# Patient Record
Sex: MALE | Race: WHITE | Employment: OTHER | URBAN - NONMETROPOLITAN AREA
[De-identification: names, ages, dates, MRNs, and addresses within clinical notes are randomized per-mention and may not be internally consistent; named-entity substitution may affect disease eponyms.]

---

## 2018-09-13 ENCOUNTER — OFFICE VISIT (OUTPATIENT)
Dept: FAMILY MEDICINE CLINIC | Age: 68
End: 2018-09-13
Payer: MEDICARE

## 2018-09-13 VITALS
BODY MASS INDEX: 26.07 KG/M2 | HEART RATE: 66 BPM | WEIGHT: 176 LBS | DIASTOLIC BLOOD PRESSURE: 80 MMHG | HEIGHT: 69 IN | SYSTOLIC BLOOD PRESSURE: 138 MMHG

## 2018-09-13 DIAGNOSIS — L40.9 PSORIASIS: ICD-10-CM

## 2018-09-13 DIAGNOSIS — R35.1 BENIGN PROSTATIC HYPERPLASIA WITH NOCTURIA: Primary | ICD-10-CM

## 2018-09-13 DIAGNOSIS — N40.1 BENIGN PROSTATIC HYPERPLASIA WITH NOCTURIA: Primary | ICD-10-CM

## 2018-09-13 DIAGNOSIS — E78.2 MIXED HYPERLIPIDEMIA: ICD-10-CM

## 2018-09-13 DIAGNOSIS — Z12.5 SCREENING PSA (PROSTATE SPECIFIC ANTIGEN): ICD-10-CM

## 2018-09-13 PROCEDURE — 99203 OFFICE O/P NEW LOW 30 MIN: CPT | Performed by: FAMILY MEDICINE

## 2018-09-13 RX ORDER — TAMSULOSIN HYDROCHLORIDE 0.4 MG/1
0.4 CAPSULE ORAL DAILY
COMMUNITY
End: 2018-09-21 | Stop reason: SDUPTHER

## 2018-09-13 RX ORDER — ASPIRIN 81 MG/1
81 TABLET ORAL DAILY
COMMUNITY

## 2018-09-13 RX ORDER — KETOCONAZOLE 20 MG/ML
SHAMPOO TOPICAL EVERY OTHER DAY
COMMUNITY
End: 2020-03-05 | Stop reason: SDUPTHER

## 2018-09-13 RX ORDER — ASPIRIN 81 MG
1 TABLET, DELAYED RELEASE (ENTERIC COATED) ORAL DAILY
COMMUNITY

## 2018-09-13 RX ORDER — TRIAMCINOLONE ACETONIDE 0.25 MG/G
CREAM TOPICAL EVERY 4 HOURS PRN
COMMUNITY
End: 2021-10-08 | Stop reason: ALTCHOICE

## 2018-09-13 ASSESSMENT — PATIENT HEALTH QUESTIONNAIRE - PHQ9
SUM OF ALL RESPONSES TO PHQ QUESTIONS 1-9: 0
2. FEELING DOWN, DEPRESSED OR HOPELESS: 0
SUM OF ALL RESPONSES TO PHQ9 QUESTIONS 1 & 2: 0
1. LITTLE INTEREST OR PLEASURE IN DOING THINGS: 0
SUM OF ALL RESPONSES TO PHQ QUESTIONS 1-9: 0

## 2018-09-13 ASSESSMENT — ENCOUNTER SYMPTOMS
SHORTNESS OF BREATH: 0
WHEEZING: 0

## 2018-09-13 NOTE — PROGRESS NOTES
[facility] PROFESSIONAL SERVS  [facility]  [address] [address]  Dept: [phone]  Dept Fax: [phone]  Loc: [phone]  PROGRESS NOTE      Visit Date: 9/13/2018    Jeannine Marcial is a 70 y.o. male who presents today for:  Chief Complaint   Patient presents with    New Patient     Establish. Wants routine labs       Subjective:  HPI     New patient. Moved from [facility] as wife has dementia. BPH:  On flomax. Doing well. Urinates about 3x per night. No dysuria. He does not recall previous PSA test.    Hyperlipidemia:  Not on any medication. Had side effects from crestor. No hx of MI. Hep B:  He thinks he got it from travel. Psoriasis:  Takes medication for scalp. Has itching on arms. Last blood work about 1 year ago. Has dry cough    Had colonoscopy about 5 years ago. Review of Systems   Constitutional: Negative for chills and fever. Respiratory: Negative for shortness of breath and wheezing. Cardiovascular: Negative for chest pain and leg swelling. Genitourinary: Positive for frequency. Negative for dysuria and urgency. Skin: Negative for rash and wound.      Past Medical History:   Diagnosis Date    BPH (benign prostatic hyperplasia)     Hepatitis B     Hyperlipidemia     Psoriasis       Past Surgical History:   Procedure Laterality Date    BACK SURGERY  2015    Herniated disk     MOUTH SURGERY  1968     Family History   Problem Relation Age of Onset    High Cholesterol Mother     Emphysema Father     Asthma Father     Cancer Sister     Cancer Brother      Social History   Substance Use Topics    Smoking status: Never Smoker    Smokeless tobacco: Never Used    Alcohol use No      Current Outpatient Prescriptions   Medication Sig Dispense Refill    Multiple Vitamins-Minerals (MULTIVITAMIN-MINERALS) TABS tablet Take 1 tablet by mouth daily      aspirin EC 81 MG EC tablet Take 81 mg by mouth daily     

## 2018-09-14 ENCOUNTER — NURSE ONLY (OUTPATIENT)
Dept: FAMILY MEDICINE CLINIC | Age: 68
End: 2018-09-14
Payer: MEDICARE

## 2018-09-14 DIAGNOSIS — E78.2 MIXED HYPERLIPIDEMIA: ICD-10-CM

## 2018-09-14 DIAGNOSIS — Z12.5 SCREENING PSA (PROSTATE SPECIFIC ANTIGEN): ICD-10-CM

## 2018-09-14 LAB
ALBUMIN SERPL-MCNC: 4.8 G/DL (ref 3.5–5.1)
ALP BLD-CCNC: 118 U/L (ref 38–126)
ALT SERPL-CCNC: 27 U/L (ref 11–66)
ANION GAP SERPL CALCULATED.3IONS-SCNC: 12 MEQ/L (ref 8–16)
AST SERPL-CCNC: 19 U/L (ref 5–40)
BILIRUB SERPL-MCNC: 0.4 MG/DL (ref 0.3–1.2)
BUN BLDV-MCNC: 25 MG/DL (ref 7–22)
CALCIUM SERPL-MCNC: 9.9 MG/DL (ref 8.5–10.5)
CHLORIDE BLD-SCNC: 101 MEQ/L (ref 98–111)
CHOLESTEROL, TOTAL: 271 MG/DL (ref 100–199)
CO2: 28 MEQ/L (ref 23–33)
CREAT SERPL-MCNC: 0.9 MG/DL (ref 0.4–1.2)
ERYTHROCYTE [DISTWIDTH] IN BLOOD BY AUTOMATED COUNT: 12.9 % (ref 11.5–14.5)
ERYTHROCYTE [DISTWIDTH] IN BLOOD BY AUTOMATED COUNT: 42.3 FL (ref 35–45)
GFR SERPL CREATININE-BSD FRML MDRD: 84 ML/MIN/1.73M2
GLUCOSE BLD-MCNC: 108 MG/DL (ref 70–108)
HCT VFR BLD CALC: 45.8 % (ref 42–52)
HDLC SERPL-MCNC: 34 MG/DL
HEMOGLOBIN: 15.4 GM/DL (ref 14–18)
LDL CHOLESTEROL CALCULATED: 186 MG/DL
MCH RBC QN AUTO: 30.4 PG (ref 26–33)
MCHC RBC AUTO-ENTMCNC: 33.6 GM/DL (ref 32.2–35.5)
MCV RBC AUTO: 90.3 FL (ref 80–94)
PLATELET # BLD: 196 THOU/MM3 (ref 130–400)
PMV BLD AUTO: 11.7 FL (ref 9.4–12.4)
POTASSIUM SERPL-SCNC: 4.5 MEQ/L (ref 3.5–5.2)
PROSTATE SPECIFIC ANTIGEN: 3.18 NG/ML (ref 0–1)
RBC # BLD: 5.07 MILL/MM3 (ref 4.7–6.1)
SODIUM BLD-SCNC: 141 MEQ/L (ref 135–145)
TOTAL PROTEIN: 7.6 G/DL (ref 6.1–8)
TRIGL SERPL-MCNC: 254 MG/DL (ref 0–199)
WBC # BLD: 5 THOU/MM3 (ref 4.8–10.8)

## 2018-09-14 PROCEDURE — 36415 COLL VENOUS BLD VENIPUNCTURE: CPT | Performed by: FAMILY MEDICINE

## 2018-09-17 ENCOUNTER — TELEPHONE (OUTPATIENT)
Dept: FAMILY MEDICINE CLINIC | Age: 68
End: 2018-09-17

## 2018-09-17 DIAGNOSIS — R97.20 ELEVATED PSA: Primary | ICD-10-CM

## 2018-09-17 NOTE — TELEPHONE ENCOUNTER
Patient called back- results and directive given. Patient states that he previously followed with a urologist in [place] but does not have a local urologist. Requesting a referral to one locally. Patient does not have a preference as to where he goes, would like to go to [address] [place] [address]. Referral placed awaiting signature. Patient advised to contact our office if he does not hear anything from [address] [place] [address] Urology to schedule.

## 2018-09-21 ENCOUNTER — OFFICE VISIT (OUTPATIENT)
Dept: UROLOGY | Age: 68
End: 2018-09-21
Payer: MEDICARE

## 2018-09-21 VITALS
WEIGHT: 178 LBS | BODY MASS INDEX: 26.36 KG/M2 | HEIGHT: 69 IN | DIASTOLIC BLOOD PRESSURE: 70 MMHG | SYSTOLIC BLOOD PRESSURE: 128 MMHG

## 2018-09-21 DIAGNOSIS — R97.20 ELEVATED PSA: Primary | ICD-10-CM

## 2018-09-21 LAB
BILIRUBIN URINE: NEGATIVE
BLOOD URINE, POC: NEGATIVE
CHARACTER, URINE: CLEAR
COLOR, URINE: YELLOW
GLUCOSE URINE: NEGATIVE MG/DL
KETONES, URINE: NEGATIVE
LEUKOCYTE CLUMPS, URINE: NEGATIVE
NITRITE, URINE: NEGATIVE
PH, URINE: 7
POST VOID RESIDUAL (PVR): 93 ML
PROTEIN, URINE: NEGATIVE MG/DL
SPECIFIC GRAVITY, URINE: 1.01 (ref 1–1.03)
UROBILINOGEN, URINE: 1 EU/DL

## 2018-09-21 PROCEDURE — 99203 OFFICE O/P NEW LOW 30 MIN: CPT | Performed by: NURSE PRACTITIONER

## 2018-09-21 PROCEDURE — 81003 URINALYSIS AUTO W/O SCOPE: CPT | Performed by: NURSE PRACTITIONER

## 2018-09-21 PROCEDURE — 51798 US URINE CAPACITY MEASURE: CPT | Performed by: NURSE PRACTITIONER

## 2018-09-21 RX ORDER — TAMSULOSIN HYDROCHLORIDE 0.4 MG/1
0.4 CAPSULE ORAL DAILY
Qty: 90 CAPSULE | Refills: 3 | Status: SHIPPED | OUTPATIENT
Start: 2018-09-21 | End: 2019-09-05 | Stop reason: SDUPTHER

## 2018-09-21 ASSESSMENT — ENCOUNTER SYMPTOMS
BACK PAIN: 0
VOMITING: 0
ABDOMINAL PAIN: 0
NAUSEA: 0
EYES NEGATIVE: 1
RESPIRATORY NEGATIVE: 1

## 2018-09-21 NOTE — PROGRESS NOTES
High Cholesterol in his mother. Social History  Gallardo  reports that he has never smoked. He has never used smokeless tobacco. He reports that he does not drink alcohol or use drugs. Subjective:      Review of Systems   Constitutional: Negative for activity change, appetite change, chills, diaphoresis, fatigue, fever and unexpected weight change. HENT: Negative. Eyes: Negative. Respiratory: Negative. Cardiovascular: Negative. Gastrointestinal: Negative for abdominal pain, nausea and vomiting. Endocrine: Negative. Genitourinary: Negative for decreased urine volume, difficulty urinating, discharge, dysuria, enuresis, flank pain, frequency, genital sores, hematuria, penile pain, penile swelling, scrotal swelling, testicular pain and urgency. Musculoskeletal: Negative for back pain and myalgias. Neurological: Negative. Hematological: Negative. Psychiatric/Behavioral: Negative. Objective:   /70   Ht 5' 9\" (1.753 m)   Wt 178 lb (80.7 kg)   BMI 26.29 kg/m²     Physical Exam   Constitutional: He is oriented to person, place, and time. He appears well-developed and well-nourished. No distress. HENT:   Head: Normocephalic and atraumatic. Right Ear: External ear normal.   Left Ear: External ear normal.   Eyes: Pupils are equal, round, and reactive to light. Conjunctivae and EOM are normal.   Neck: Normal range of motion. No JVD present. Cardiovascular: Normal rate, regular rhythm and normal heart sounds. No murmur heard. Pulmonary/Chest: Effort normal and breath sounds normal. No stridor. No respiratory distress. He has no rales. Abdominal: Soft. Bowel sounds are normal. He exhibits no distension. There is no tenderness. There is no rebound and no guarding. Genitourinary:   Genitourinary Comments: Prostate enlarged but soft without nodule or induration   Musculoskeletal: Normal range of motion. He exhibits no edema.    Neurological: He is alert and oriented to

## 2018-09-25 ENCOUNTER — TELEPHONE (OUTPATIENT)
Dept: UROLOGY | Age: 68
End: 2018-09-25

## 2018-09-25 NOTE — TELEPHONE ENCOUNTER
I called and spoke with the patient and notified him that it is possible the flomax is contributing to his dizziness especially if it is occurring when changing position. (such as when going from sitting to standing). If this is routinely occurring he can trial stopping the flomax. However we may need to consider starting a new medication if his urinary symptoms worsen. Patient stated that he accidentally took 2 pills 3 nights ago and has taken one pill each night since. He stated that he has been taking Flomax since December and his has not had a change in his b/p medication. Patient agreed to stop taking Flomax for a few days and will call our office to let us know if the dizziness and blurred vision have decreased.

## 2018-09-25 NOTE — TELEPHONE ENCOUNTER
Patient stopped in office and said that ever since he started taking the flomax he has had some blurry sight and some dizziness where his balance is off. He is wondering if it could be from the Flomax and if he should stop it?   Please advise

## 2018-09-25 NOTE — TELEPHONE ENCOUNTER
It is possible the flomax is contributing to his dizziness especially if it is occurring when changing position. (such as when going from sitting to standing). If this is routinely occurring he can trial stopping the flomax. However we may need to consider starting a new medication if his urinary symptoms worsen. Has he had any new titration of blood pressure medications? He has been on the Flomax since December of last year.

## 2019-01-04 ENCOUNTER — OFFICE VISIT (OUTPATIENT)
Dept: FAMILY MEDICINE CLINIC | Age: 69
End: 2019-01-04
Payer: MEDICARE

## 2019-01-04 VITALS
DIASTOLIC BLOOD PRESSURE: 84 MMHG | WEIGHT: 178 LBS | TEMPERATURE: 98 F | HEIGHT: 69 IN | SYSTOLIC BLOOD PRESSURE: 138 MMHG | HEART RATE: 76 BPM | BODY MASS INDEX: 26.36 KG/M2

## 2019-01-04 DIAGNOSIS — J20.8 ACUTE BRONCHITIS DUE TO OTHER SPECIFIED ORGANISMS: Primary | ICD-10-CM

## 2019-01-04 DIAGNOSIS — B96.89 ACUTE BACTERIAL SINUSITIS: ICD-10-CM

## 2019-01-04 DIAGNOSIS — J01.90 ACUTE BACTERIAL SINUSITIS: ICD-10-CM

## 2019-01-04 PROCEDURE — G8419 CALC BMI OUT NRM PARAM NOF/U: HCPCS | Performed by: FAMILY MEDICINE

## 2019-01-04 PROCEDURE — G8482 FLU IMMUNIZE ORDER/ADMIN: HCPCS | Performed by: FAMILY MEDICINE

## 2019-01-04 PROCEDURE — 1101F PT FALLS ASSESS-DOCD LE1/YR: CPT | Performed by: FAMILY MEDICINE

## 2019-01-04 PROCEDURE — 1123F ACP DISCUSS/DSCN MKR DOCD: CPT | Performed by: FAMILY MEDICINE

## 2019-01-04 PROCEDURE — 3017F COLORECTAL CA SCREEN DOC REV: CPT | Performed by: FAMILY MEDICINE

## 2019-01-04 PROCEDURE — 4040F PNEUMOC VAC/ADMIN/RCVD: CPT | Performed by: FAMILY MEDICINE

## 2019-01-04 PROCEDURE — 1036F TOBACCO NON-USER: CPT | Performed by: FAMILY MEDICINE

## 2019-01-04 PROCEDURE — 99213 OFFICE O/P EST LOW 20 MIN: CPT | Performed by: FAMILY MEDICINE

## 2019-01-04 PROCEDURE — G8427 DOCREV CUR MEDS BY ELIG CLIN: HCPCS | Performed by: FAMILY MEDICINE

## 2019-01-04 RX ORDER — AMOXICILLIN AND CLAVULANATE POTASSIUM 875; 125 MG/1; MG/1
1 TABLET, FILM COATED ORAL 2 TIMES DAILY
Qty: 20 TABLET | Refills: 0 | Status: SHIPPED | OUTPATIENT
Start: 2019-01-04 | End: 2019-01-14

## 2019-01-04 RX ORDER — PREDNISONE 20 MG/1
20 TABLET ORAL 2 TIMES DAILY
Qty: 10 TABLET | Refills: 0 | Status: SHIPPED | OUTPATIENT
Start: 2019-01-04 | End: 2019-04-05 | Stop reason: SDUPTHER

## 2019-01-04 RX ORDER — BENZONATATE 100 MG/1
100 CAPSULE ORAL 3 TIMES DAILY PRN
Qty: 30 CAPSULE | Refills: 0 | Status: SHIPPED | OUTPATIENT
Start: 2019-01-04 | End: 2019-01-11

## 2019-01-04 ASSESSMENT — ENCOUNTER SYMPTOMS
VOICE CHANGE: 1
RHINORRHEA: 1
COUGH: 1
TROUBLE SWALLOWING: 0
SORE THROAT: 1
WHEEZING: 0
SHORTNESS OF BREATH: 1

## 2019-03-08 ENCOUNTER — OFFICE VISIT (OUTPATIENT)
Dept: FAMILY MEDICINE CLINIC | Age: 69
End: 2019-03-08
Payer: MEDICARE

## 2019-03-08 VITALS
WEIGHT: 178 LBS | SYSTOLIC BLOOD PRESSURE: 124 MMHG | HEART RATE: 62 BPM | HEIGHT: 69 IN | DIASTOLIC BLOOD PRESSURE: 62 MMHG | BODY MASS INDEX: 26.36 KG/M2

## 2019-03-08 DIAGNOSIS — N40.1 BENIGN PROSTATIC HYPERPLASIA WITH NOCTURIA: Primary | ICD-10-CM

## 2019-03-08 DIAGNOSIS — Z12.11 COLON CANCER SCREENING: ICD-10-CM

## 2019-03-08 DIAGNOSIS — E78.2 MIXED HYPERLIPIDEMIA: ICD-10-CM

## 2019-03-08 DIAGNOSIS — R35.1 BENIGN PROSTATIC HYPERPLASIA WITH NOCTURIA: Primary | ICD-10-CM

## 2019-03-08 PROCEDURE — G8510 SCR DEP NEG, NO PLAN REQD: HCPCS | Performed by: FAMILY MEDICINE

## 2019-03-08 PROCEDURE — G8427 DOCREV CUR MEDS BY ELIG CLIN: HCPCS | Performed by: FAMILY MEDICINE

## 2019-03-08 PROCEDURE — 99213 OFFICE O/P EST LOW 20 MIN: CPT | Performed by: FAMILY MEDICINE

## 2019-03-08 PROCEDURE — 1036F TOBACCO NON-USER: CPT | Performed by: FAMILY MEDICINE

## 2019-03-08 PROCEDURE — G8482 FLU IMMUNIZE ORDER/ADMIN: HCPCS | Performed by: FAMILY MEDICINE

## 2019-03-08 PROCEDURE — 4040F PNEUMOC VAC/ADMIN/RCVD: CPT | Performed by: FAMILY MEDICINE

## 2019-03-08 PROCEDURE — 3017F COLORECTAL CA SCREEN DOC REV: CPT | Performed by: FAMILY MEDICINE

## 2019-03-08 PROCEDURE — G8419 CALC BMI OUT NRM PARAM NOF/U: HCPCS | Performed by: FAMILY MEDICINE

## 2019-03-08 PROCEDURE — 1123F ACP DISCUSS/DSCN MKR DOCD: CPT | Performed by: FAMILY MEDICINE

## 2019-03-08 PROCEDURE — 1101F PT FALLS ASSESS-DOCD LE1/YR: CPT | Performed by: FAMILY MEDICINE

## 2019-03-08 RX ORDER — ATORVASTATIN CALCIUM 10 MG/1
10 TABLET, FILM COATED ORAL DAILY
Qty: 30 TABLET | Refills: 0 | Status: SHIPPED | OUTPATIENT
Start: 2019-03-08 | End: 2019-04-04 | Stop reason: SDUPTHER

## 2019-03-08 ASSESSMENT — PATIENT HEALTH QUESTIONNAIRE - PHQ9
SUM OF ALL RESPONSES TO PHQ9 QUESTIONS 1 & 2: 0
SUM OF ALL RESPONSES TO PHQ QUESTIONS 1-9: 0
SUM OF ALL RESPONSES TO PHQ QUESTIONS 1-9: 0
2. FEELING DOWN, DEPRESSED OR HOPELESS: 0
1. LITTLE INTEREST OR PLEASURE IN DOING THINGS: 0

## 2019-03-08 ASSESSMENT — ENCOUNTER SYMPTOMS
SHORTNESS OF BREATH: 0
WHEEZING: 0

## 2019-03-11 ENCOUNTER — TELEPHONE (OUTPATIENT)
Dept: FAMILY MEDICINE CLINIC | Age: 69
End: 2019-03-11

## 2019-03-11 DIAGNOSIS — Z12.11 COLON CANCER SCREENING: ICD-10-CM

## 2019-03-11 LAB
CONTROL: PRESENT
HEMOCCULT STL QL: NEGATIVE

## 2019-03-11 PROCEDURE — 82274 ASSAY TEST FOR BLOOD FECAL: CPT | Performed by: FAMILY MEDICINE

## 2019-04-03 ENCOUNTER — NURSE ONLY (OUTPATIENT)
Dept: FAMILY MEDICINE CLINIC | Age: 69
End: 2019-04-03
Payer: MEDICARE

## 2019-04-03 DIAGNOSIS — R97.20 ELEVATED PSA: ICD-10-CM

## 2019-04-03 DIAGNOSIS — E78.2 MIXED HYPERLIPIDEMIA: ICD-10-CM

## 2019-04-03 LAB
CHOLESTEROL, TOTAL: 173 MG/DL (ref 100–199)
HDLC SERPL-MCNC: 36 MG/DL
LDL CHOLESTEROL CALCULATED: 96 MG/DL
PROSTATE SPECIFIC ANTIGEN: 3.37 NG/ML (ref 0–1)
TRIGL SERPL-MCNC: 204 MG/DL (ref 0–199)

## 2019-04-03 PROCEDURE — 36415 COLL VENOUS BLD VENIPUNCTURE: CPT | Performed by: FAMILY MEDICINE

## 2019-04-04 ENCOUNTER — TELEPHONE (OUTPATIENT)
Dept: FAMILY MEDICINE CLINIC | Age: 69
End: 2019-04-04

## 2019-04-04 ENCOUNTER — TELEPHONE (OUTPATIENT)
Dept: UROLOGY | Age: 69
End: 2019-04-04

## 2019-04-04 DIAGNOSIS — E78.2 MIXED HYPERLIPIDEMIA: ICD-10-CM

## 2019-04-04 RX ORDER — ATORVASTATIN CALCIUM 10 MG/1
10 TABLET, FILM COATED ORAL DAILY
Qty: 90 TABLET | Refills: 1 | Status: SHIPPED | OUTPATIENT
Start: 2019-04-04 | End: 2019-09-05 | Stop reason: SDUPTHER

## 2019-04-04 NOTE — TELEPHONE ENCOUNTER
Cholesterol is much better on lipitor. LDL is cut in half. rx for lipitor 10 mg sent to pharmacy. Please advise patient.   Timur Etienne MD

## 2019-04-04 NOTE — TELEPHONE ENCOUNTER
I called the patient and advised him of the message from [facility] CNP. PSA is 3.37. Repeat in 6 months. He voiced understanding and copy of order sent via mail.

## 2019-04-05 ENCOUNTER — OFFICE VISIT (OUTPATIENT)
Dept: FAMILY MEDICINE CLINIC | Age: 69
End: 2019-04-05
Payer: MEDICARE

## 2019-04-05 VITALS
OXYGEN SATURATION: 98 % | TEMPERATURE: 98.7 F | WEIGHT: 178.6 LBS | HEART RATE: 68 BPM | HEIGHT: 69 IN | BODY MASS INDEX: 26.45 KG/M2 | DIASTOLIC BLOOD PRESSURE: 82 MMHG | SYSTOLIC BLOOD PRESSURE: 132 MMHG

## 2019-04-05 DIAGNOSIS — J20.8 ACUTE BRONCHITIS DUE TO OTHER SPECIFIED ORGANISMS: ICD-10-CM

## 2019-04-05 PROCEDURE — G8419 CALC BMI OUT NRM PARAM NOF/U: HCPCS | Performed by: FAMILY MEDICINE

## 2019-04-05 PROCEDURE — 4040F PNEUMOC VAC/ADMIN/RCVD: CPT | Performed by: FAMILY MEDICINE

## 2019-04-05 PROCEDURE — 1123F ACP DISCUSS/DSCN MKR DOCD: CPT | Performed by: FAMILY MEDICINE

## 2019-04-05 PROCEDURE — 1036F TOBACCO NON-USER: CPT | Performed by: FAMILY MEDICINE

## 2019-04-05 PROCEDURE — G8427 DOCREV CUR MEDS BY ELIG CLIN: HCPCS | Performed by: FAMILY MEDICINE

## 2019-04-05 PROCEDURE — 99213 OFFICE O/P EST LOW 20 MIN: CPT | Performed by: FAMILY MEDICINE

## 2019-04-05 PROCEDURE — 3017F COLORECTAL CA SCREEN DOC REV: CPT | Performed by: FAMILY MEDICINE

## 2019-04-05 RX ORDER — CEFDINIR 300 MG/1
300 CAPSULE ORAL 2 TIMES DAILY
Qty: 20 CAPSULE | Refills: 0 | Status: SHIPPED | OUTPATIENT
Start: 2019-04-05 | End: 2019-04-15

## 2019-04-05 RX ORDER — PREDNISONE 20 MG/1
20 TABLET ORAL 2 TIMES DAILY
Qty: 10 TABLET | Refills: 0 | Status: SHIPPED | OUTPATIENT
Start: 2019-04-05 | End: 2019-04-10

## 2019-04-06 NOTE — PROGRESS NOTES
Name:  Eh Molina  :    1950    Chief Complaint   Patient presents with    Cough     4 days    Shortness of Breath       HPI:  Here with wife. Head congestion and cough for few days. More severe cough and SOB. No clear history of Asthma, but consistent cough reaction to minor infections. No fever. Affecting sleep. Physical Exam:      /82 (Site: Right Upper Arm, Position: Sitting, Cuff Size: Medium Adult)   Pulse 68   Temp 98.7 °F (37.1 °C) (Oral)   Ht 5' 9\" (1.753 m)   Wt 178 lb 9.6 oz (81 kg)   SpO2 98%   BMI 26.37 kg/m²     Severe cough. Mild production. Some SOB. ENT:   TM's clear. Phar congested without pus. No nodes. Lungs are clear. Heart in RRR with no murmur. Assessment/Plan:      URI/Sinusitis with bronchospasm. Gallardo was seen today for cough and shortness of breath. Diagnoses and all orders for this visit:    Acute bronchitis due to other specified organisms  -     predniSONE (DELTASONE) 20 MG tablet; Take 1 tablet by mouth 2 times daily for 5 days    Other orders  -     cefdinir (OMNICEF) 300 MG capsule;  Take 1 capsule by mouth 2 times daily for 10 days

## 2019-04-26 ENCOUNTER — OFFICE VISIT (OUTPATIENT)
Dept: FAMILY MEDICINE CLINIC | Age: 69
End: 2019-04-26
Payer: MEDICARE

## 2019-04-26 VITALS
WEIGHT: 179.8 LBS | HEART RATE: 64 BPM | DIASTOLIC BLOOD PRESSURE: 82 MMHG | HEIGHT: 69 IN | TEMPERATURE: 97.7 F | BODY MASS INDEX: 26.63 KG/M2 | SYSTOLIC BLOOD PRESSURE: 138 MMHG

## 2019-04-26 DIAGNOSIS — H10.31 ACUTE BACTERIAL CONJUNCTIVITIS OF RIGHT EYE: Primary | ICD-10-CM

## 2019-04-26 PROCEDURE — 99213 OFFICE O/P EST LOW 20 MIN: CPT | Performed by: FAMILY MEDICINE

## 2019-04-26 PROCEDURE — G8427 DOCREV CUR MEDS BY ELIG CLIN: HCPCS | Performed by: FAMILY MEDICINE

## 2019-04-26 PROCEDURE — 1123F ACP DISCUSS/DSCN MKR DOCD: CPT | Performed by: FAMILY MEDICINE

## 2019-04-26 PROCEDURE — 4040F PNEUMOC VAC/ADMIN/RCVD: CPT | Performed by: FAMILY MEDICINE

## 2019-04-26 PROCEDURE — G8419 CALC BMI OUT NRM PARAM NOF/U: HCPCS | Performed by: FAMILY MEDICINE

## 2019-04-26 PROCEDURE — 3017F COLORECTAL CA SCREEN DOC REV: CPT | Performed by: FAMILY MEDICINE

## 2019-04-26 PROCEDURE — 1036F TOBACCO NON-USER: CPT | Performed by: FAMILY MEDICINE

## 2019-04-26 RX ORDER — POLYMYXIN B SULFATE AND TRIMETHOPRIM 1; 10000 MG/ML; [USP'U]/ML
1 SOLUTION OPHTHALMIC EVERY 4 HOURS
Qty: 1 BOTTLE | Refills: 0 | Status: SHIPPED | OUTPATIENT
Start: 2019-04-26 | End: 2019-05-01

## 2019-04-26 ASSESSMENT — ENCOUNTER SYMPTOMS
EYE DISCHARGE: 0
EYE REDNESS: 1
PHOTOPHOBIA: 1
EYE PAIN: 1
EYE ITCHING: 0

## 2019-04-26 NOTE — PROGRESS NOTES
[facility] PROFESSIONAL SERVS  [facility]  [address] [address]  Dept: [phone]  Dept Fax: [phone]  Loc: [phone]  PROGRESS NOTE      Visit Date: 4/26/2019    Tim Flowers is a 70 y.o. male who presents today for:  Chief Complaint   Patient presents with    Other     right eye red and painful-started 3 days ago-no itching and no matter       Subjective:  HPI     Right eye redness:  Started about April 10th. He tried opcom A eye drops which helped. No itching. He had tried visine as well. Soreness started a few days ago. Right eye redness worsened over past 3 days. No hx of allergies. No crusting of the eye. No problems with left eye. Denies foreign body. He wears glasses. Review of Systems   Constitutional: Negative for chills and fever. Eyes: Positive for photophobia, pain, redness and visual disturbance. Negative for discharge and itching. Past Medical History:   Diagnosis Date    BPH (benign prostatic hyperplasia)     Hepatitis B     Hyperlipidemia     Psoriasis       Current Outpatient Medications   Medication Sig Dispense Refill    atorvastatin (LIPITOR) 10 MG tablet Take 1 tablet by mouth daily 90 tablet 1    tamsulosin (FLOMAX) 0.4 MG capsule Take 1 capsule by mouth daily 90 capsule 3    Multiple Vitamins-Minerals (MULTIVITAMIN-MINERALS) TABS tablet Take 1 tablet by mouth daily      aspirin EC 81 MG EC tablet Take 81 mg by mouth daily      triamcinolone (KENALOG) 0.025 % cream Apply topically every 4 hours as needed Apply Topically      ketoconazole (NIZORAL) 2 % shampoo Apply topically every other day Apply topically daily as needed. No current facility-administered medications for this visit.       Allergies   Allergen Reactions    Crestor [Rosuvastatin Calcium]     Benadryl [Diphenhydramine] Other (See Comments)     Jitter, hyperactivity if takes more than one day        Objective:     /82 (Site: Left Upper Arm, Position: Sitting, Cuff Size: Large Adult)   Pulse 64   Temp 97.7 °F (36.5 °C) (Oral)   Ht 5' 9\" (1.753 m)   Wt 179 lb 12.8 oz (81.6 kg)   BMI 26.55 kg/m²   Physical Exam   Constitutional: He is oriented to person, place, and time. He appears well-developed and well-nourished. No distress. HENT:   Head: Head is without right periorbital erythema and without left periorbital erythema. Eyes: Pupils are equal, round, and reactive to light. EOM are normal. Right eye exhibits no discharge, no exudate and no hordeolum. No foreign body present in the right eye. Left eye exhibits no discharge. Right conjunctiva is injected. Left conjunctiva is not injected. Neurological: He is alert and oriented to person, place, and time. Psychiatric: He has a normal mood and affect. His behavior is normal.   Vitals reviewed. No swelling of stephania-orbital area. Impression/Plan:  1. Acute bacterial conjunctivitis of right eye  New problem. Possible bacterial infection. Treat with antibiotic drops. If not better by Monday, call optometrist for eval or call us to be seen again. - trimethoprim-polymyxin b (POLYTRIM) 85913-6.1 UNIT/ML-% ophthalmic solution; Place 1 drop into the right eye every 4 hours for 5 days  Dispense: 1 Bottle; Refill: 0      They voiced understanding. All questions answered. They agreed with treatment plan. See patient instructions for any educational materials that may have been given. Discussed use, benefit, and side effects of prescribed medications. (Please note that portions of this note may have been completed with a voice recognition program.  Efforts were made to edit the dictation but occasionally words are mis-transcribed.)    Return if symptoms worsen or fail to improve.        Electronically signed by Sterling Berger MD on 4/26/2019 at 11:28 AM

## 2019-09-05 ENCOUNTER — OFFICE VISIT (OUTPATIENT)
Dept: FAMILY MEDICINE CLINIC | Age: 69
End: 2019-09-05
Payer: MEDICARE

## 2019-09-05 VITALS
DIASTOLIC BLOOD PRESSURE: 82 MMHG | SYSTOLIC BLOOD PRESSURE: 126 MMHG | HEART RATE: 60 BPM | BODY MASS INDEX: 26.81 KG/M2 | HEIGHT: 69 IN | WEIGHT: 181 LBS

## 2019-09-05 DIAGNOSIS — R35.1 BENIGN PROSTATIC HYPERPLASIA WITH NOCTURIA: ICD-10-CM

## 2019-09-05 DIAGNOSIS — N40.1 BENIGN PROSTATIC HYPERPLASIA WITH NOCTURIA: ICD-10-CM

## 2019-09-05 DIAGNOSIS — E78.2 MIXED HYPERLIPIDEMIA: ICD-10-CM

## 2019-09-05 DIAGNOSIS — Z00.00 ROUTINE GENERAL MEDICAL EXAMINATION AT A HEALTH CARE FACILITY: Primary | ICD-10-CM

## 2019-09-05 PROCEDURE — 1036F TOBACCO NON-USER: CPT | Performed by: FAMILY MEDICINE

## 2019-09-05 PROCEDURE — 3017F COLORECTAL CA SCREEN DOC REV: CPT | Performed by: FAMILY MEDICINE

## 2019-09-05 PROCEDURE — G8419 CALC BMI OUT NRM PARAM NOF/U: HCPCS | Performed by: FAMILY MEDICINE

## 2019-09-05 PROCEDURE — 1123F ACP DISCUSS/DSCN MKR DOCD: CPT | Performed by: FAMILY MEDICINE

## 2019-09-05 PROCEDURE — G8427 DOCREV CUR MEDS BY ELIG CLIN: HCPCS | Performed by: FAMILY MEDICINE

## 2019-09-05 PROCEDURE — 4040F PNEUMOC VAC/ADMIN/RCVD: CPT | Performed by: FAMILY MEDICINE

## 2019-09-05 PROCEDURE — 99212 OFFICE O/P EST SF 10 MIN: CPT | Performed by: FAMILY MEDICINE

## 2019-09-05 PROCEDURE — G0439 PPPS, SUBSEQ VISIT: HCPCS | Performed by: FAMILY MEDICINE

## 2019-09-05 RX ORDER — TAMSULOSIN HYDROCHLORIDE 0.4 MG/1
0.4 CAPSULE ORAL DAILY
Qty: 90 CAPSULE | Refills: 3 | Status: SHIPPED | OUTPATIENT
Start: 2019-09-05 | End: 2020-10-09 | Stop reason: SDUPTHER

## 2019-09-05 RX ORDER — ATORVASTATIN CALCIUM 10 MG/1
10 TABLET, FILM COATED ORAL DAILY
Qty: 90 TABLET | Refills: 1 | Status: SHIPPED | OUTPATIENT
Start: 2019-09-05 | End: 2020-03-05 | Stop reason: SDUPTHER

## 2019-09-05 ASSESSMENT — PATIENT HEALTH QUESTIONNAIRE - PHQ9
SUM OF ALL RESPONSES TO PHQ QUESTIONS 1-9: 0
SUM OF ALL RESPONSES TO PHQ QUESTIONS 1-9: 0

## 2019-09-05 ASSESSMENT — ENCOUNTER SYMPTOMS
SHORTNESS OF BREATH: 0
WHEEZING: 0

## 2019-09-05 ASSESSMENT — LIFESTYLE VARIABLES: HOW OFTEN DO YOU HAVE A DRINK CONTAINING ALCOHOL: 0

## 2019-09-05 NOTE — PROGRESS NOTES
Medicare Annual Wellness Visit  Name: Wili Garland Date: 2019   MRN: [id number] Sex: Male   Age: 78 y.o. Ethnicity: Non-/Non    : 1950 Race: Suzy Bowden is here for Medicare AWV and Hyperlipidemia (6 month check up)    Screenings for behavioral, psychosocial and functional/safety risks, and cognitive dysfunction are all negative except as indicated below. These results, as well as other patient data from the [address] form, are documented in Flowsheets linked to this Encounter. Allergies   Allergen Reactions    Crestor [Rosuvastatin Calcium]     Benadryl [Diphenhydramine] Other (See Comments)     Jitter, hyperactivity if takes more than one day      Prior to Visit Medications    Medication Sig Taking? Authorizing Provider   tamsulosin (FLOMAX) 0.4 MG capsule Take 1 capsule by mouth daily Yes Igor Marrufo APRN - CNP   Multiple Vitamins-Minerals (MULTIVITAMIN-MINERALS) TABS tablet Take 1 tablet by mouth daily Yes Historical Provider, MD   aspirin EC 81 MG EC tablet Take 81 mg by mouth daily Yes Historical Provider, MD   triamcinolone (KENALOG) 0.025 % cream Apply topically every 4 hours as needed Apply Topically Yes Historical Provider, MD   ketoconazole (NIZORAL) 2 % shampoo Apply topically every other day Apply topically daily as needed.  Yes Historical Provider, MD   atorvastatin (LIPITOR) 10 MG tablet Take 1 tablet by mouth daily  Patient not taking: Reported on 2019  Shelly Jaramillo MD     Past Medical History:   Diagnosis Date    BPH (benign prostatic hyperplasia)     Hepatitis B     Hyperlipidemia     Psoriasis      Past Surgical History:   Procedure Laterality Date    BACK SURGERY  2015    Herniated disk     MOUTH SURGERY  1968     Family History   Problem Relation Age of Onset    High Cholesterol Mother     Emphysema Father     Asthma Father     Cancer Sister     Cancer Brother        CareTeam (Including outside

## 2019-09-16 ENCOUNTER — NURSE ONLY (OUTPATIENT)
Dept: FAMILY MEDICINE CLINIC | Age: 69
End: 2019-09-16
Payer: MEDICARE

## 2019-09-16 DIAGNOSIS — R97.20 ELEVATED PSA: ICD-10-CM

## 2019-09-16 DIAGNOSIS — E78.2 MIXED HYPERLIPIDEMIA: ICD-10-CM

## 2019-09-16 LAB
ALBUMIN SERPL-MCNC: 4.6 G/DL (ref 3.5–5.1)
ALP BLD-CCNC: 108 U/L (ref 38–126)
ALT SERPL-CCNC: 23 U/L (ref 11–66)
ANION GAP SERPL CALCULATED.3IONS-SCNC: 11 MEQ/L (ref 8–16)
AST SERPL-CCNC: 18 U/L (ref 5–40)
BILIRUB SERPL-MCNC: 0.3 MG/DL (ref 0.3–1.2)
BUN BLDV-MCNC: 24 MG/DL (ref 7–22)
CALCIUM SERPL-MCNC: 9.6 MG/DL (ref 8.5–10.5)
CHLORIDE BLD-SCNC: 103 MEQ/L (ref 98–111)
CHOLESTEROL, TOTAL: 175 MG/DL (ref 100–199)
CO2: 27 MEQ/L (ref 23–33)
CREAT SERPL-MCNC: 1.1 MG/DL (ref 0.4–1.2)
GFR SERPL CREATININE-BSD FRML MDRD: 66 ML/MIN/1.73M2
GLUCOSE BLD-MCNC: 117 MG/DL (ref 70–108)
HDLC SERPL-MCNC: 33 MG/DL
LDL CHOLESTEROL CALCULATED: 108 MG/DL
POTASSIUM SERPL-SCNC: 4.3 MEQ/L (ref 3.5–5.2)
PROSTATE SPECIFIC ANTIGEN: 3.29 NG/ML (ref 0–1)
SODIUM BLD-SCNC: 141 MEQ/L (ref 135–145)
TOTAL PROTEIN: 7.2 G/DL (ref 6.1–8)
TRIGL SERPL-MCNC: 171 MG/DL (ref 0–199)

## 2019-09-16 PROCEDURE — 36415 COLL VENOUS BLD VENIPUNCTURE: CPT | Performed by: FAMILY MEDICINE

## 2019-09-17 ENCOUNTER — TELEPHONE (OUTPATIENT)
Dept: FAMILY MEDICINE CLINIC | Age: 69
End: 2019-09-17

## 2019-09-17 DIAGNOSIS — R73.09 ELEVATED GLUCOSE: Primary | ICD-10-CM

## 2019-09-20 ENCOUNTER — NURSE ONLY (OUTPATIENT)
Dept: FAMILY MEDICINE CLINIC | Age: 69
End: 2019-09-20
Payer: MEDICARE

## 2019-09-20 DIAGNOSIS — R73.09 ELEVATED GLUCOSE: ICD-10-CM

## 2019-09-20 LAB
AVERAGE GLUCOSE: 126 MG/DL (ref 70–126)
HBA1C MFR BLD: 6.2 % (ref 4.4–6.4)

## 2019-09-20 PROCEDURE — 36415 COLL VENOUS BLD VENIPUNCTURE: CPT | Performed by: FAMILY MEDICINE

## 2019-09-21 PROBLEM — R73.03 PRE-DIABETES: Status: ACTIVE | Noted: 2019-09-21

## 2019-09-23 ENCOUNTER — TELEPHONE (OUTPATIENT)
Dept: FAMILY MEDICINE CLINIC | Age: 69
End: 2019-09-23

## 2019-09-24 ENCOUNTER — OFFICE VISIT (OUTPATIENT)
Dept: UROLOGY | Age: 69
End: 2019-09-24
Payer: MEDICARE

## 2019-09-24 VITALS
BODY MASS INDEX: 26.81 KG/M2 | WEIGHT: 181 LBS | HEIGHT: 69 IN | SYSTOLIC BLOOD PRESSURE: 144 MMHG | DIASTOLIC BLOOD PRESSURE: 80 MMHG

## 2019-09-24 DIAGNOSIS — R97.20 ELEVATED PSA: Primary | ICD-10-CM

## 2019-09-24 LAB
BILIRUBIN URINE: NEGATIVE
BLOOD URINE, POC: NEGATIVE
CHARACTER, URINE: CLEAR
COLOR, URINE: YELLOW
GLUCOSE URINE: NEGATIVE MG/DL
KETONES, URINE: NEGATIVE
LEUKOCYTE CLUMPS, URINE: NEGATIVE
NITRITE, URINE: NEGATIVE
PH, URINE: 6 (ref 5–9)
PROTEIN, URINE: NEGATIVE MG/DL
SPECIFIC GRAVITY, URINE: 1.02 (ref 1–1.03)
UROBILINOGEN, URINE: 0.2 EU/DL (ref 0–1)

## 2019-09-24 PROCEDURE — G8427 DOCREV CUR MEDS BY ELIG CLIN: HCPCS | Performed by: NURSE PRACTITIONER

## 2019-09-24 PROCEDURE — 99213 OFFICE O/P EST LOW 20 MIN: CPT | Performed by: NURSE PRACTITIONER

## 2019-09-24 PROCEDURE — 1036F TOBACCO NON-USER: CPT | Performed by: NURSE PRACTITIONER

## 2019-09-24 PROCEDURE — G8419 CALC BMI OUT NRM PARAM NOF/U: HCPCS | Performed by: NURSE PRACTITIONER

## 2019-09-24 PROCEDURE — 3017F COLORECTAL CA SCREEN DOC REV: CPT | Performed by: NURSE PRACTITIONER

## 2019-09-24 PROCEDURE — 4040F PNEUMOC VAC/ADMIN/RCVD: CPT | Performed by: NURSE PRACTITIONER

## 2019-09-24 PROCEDURE — 81003 URINALYSIS AUTO W/O SCOPE: CPT | Performed by: NURSE PRACTITIONER

## 2019-09-24 PROCEDURE — 1123F ACP DISCUSS/DSCN MKR DOCD: CPT | Performed by: NURSE PRACTITIONER

## 2019-09-24 ASSESSMENT — ENCOUNTER SYMPTOMS
BACK PAIN: 0
EYES NEGATIVE: 1
NAUSEA: 0
RESPIRATORY NEGATIVE: 1
VOMITING: 0
ABDOMINAL PAIN: 0

## 2019-09-24 NOTE — PROGRESS NOTES
surgery (1968). Family History  This patient's family history includes Asthma in his father; Cancer in his brother and sister; Emphysema in his father; High Cholesterol in his mother. Social History  Gallardo  reports that he has never smoked. He has never used smokeless tobacco. He reports that he does not drink alcohol or use drugs. Subjective:      Review of Systems   Constitutional: Negative for activity change, appetite change, chills, diaphoresis, fatigue, fever and unexpected weight change. HENT: Negative. Eyes: Negative. Respiratory: Negative. Cardiovascular: Negative. Gastrointestinal: Negative for abdominal pain, nausea and vomiting. Endocrine: Negative. Genitourinary: Negative for decreased urine volume, difficulty urinating, discharge, dysuria, enuresis, flank pain, frequency, genital sores, hematuria, penile pain, penile swelling, scrotal swelling, testicular pain and urgency. Musculoskeletal: Negative for back pain and myalgias. Neurological: Negative. Hematological: Negative. Psychiatric/Behavioral: Negative. Objective:   BP (!) 144/80   Ht 5' 9\" (1.753 m)   Wt 181 lb (82.1 kg)   BMI 26.73 kg/m²     Physical Exam   Constitutional: He is oriented to person, place, and time. He appears well-developed and well-nourished. No distress. HENT:   Head: Normocephalic and atraumatic. Right Ear: External ear normal.   Left Ear: External ear normal.   Eyes: Pupils are equal, round, and reactive to light. Conjunctivae and EOM are normal.   Neck: Normal range of motion. No JVD present. Cardiovascular: Normal rate, regular rhythm and normal heart sounds. No murmur heard. Pulmonary/Chest: Effort normal and breath sounds normal. No stridor. No respiratory distress. He has no rales. Abdominal: Soft. Bowel sounds are normal. He exhibits no distension. There is no tenderness. There is no rebound and no guarding.    Genitourinary:   Genitourinary Comments: Prostate

## 2019-09-26 ENCOUNTER — HOSPITAL ENCOUNTER (OUTPATIENT)
Age: 69
Discharge: HOME | End: 2019-09-26
Payer: MEDICARE

## 2019-09-26 ENCOUNTER — OFFICE VISIT (OUTPATIENT)
Dept: FAMILY MEDICINE CLINIC | Age: 69
End: 2019-09-26
Payer: MEDICARE

## 2019-09-26 ENCOUNTER — TELEPHONE (OUTPATIENT)
Dept: FAMILY MEDICINE CLINIC | Age: 69
End: 2019-09-26

## 2019-09-26 ENCOUNTER — HOSPITAL ENCOUNTER (OUTPATIENT)
Dept: GENERAL RADIOLOGY | Age: 69
Discharge: HOME | End: 2019-09-26
Payer: MEDICARE

## 2019-09-26 VITALS
BODY MASS INDEX: 27.16 KG/M2 | WEIGHT: 183.4 LBS | HEIGHT: 69 IN | DIASTOLIC BLOOD PRESSURE: 86 MMHG | HEART RATE: 88 BPM | SYSTOLIC BLOOD PRESSURE: 138 MMHG

## 2019-09-26 DIAGNOSIS — M25.562 ACUTE PAIN OF LEFT KNEE: Primary | ICD-10-CM

## 2019-09-26 DIAGNOSIS — M25.562 ACUTE PAIN OF LEFT KNEE: ICD-10-CM

## 2019-09-26 DIAGNOSIS — M25.462 EFFUSION OF LEFT KNEE: ICD-10-CM

## 2019-09-26 PROCEDURE — 1123F ACP DISCUSS/DSCN MKR DOCD: CPT | Performed by: FAMILY MEDICINE

## 2019-09-26 PROCEDURE — 1036F TOBACCO NON-USER: CPT | Performed by: FAMILY MEDICINE

## 2019-09-26 PROCEDURE — 4040F PNEUMOC VAC/ADMIN/RCVD: CPT | Performed by: FAMILY MEDICINE

## 2019-09-26 PROCEDURE — 3017F COLORECTAL CA SCREEN DOC REV: CPT | Performed by: FAMILY MEDICINE

## 2019-09-26 PROCEDURE — G8419 CALC BMI OUT NRM PARAM NOF/U: HCPCS | Performed by: FAMILY MEDICINE

## 2019-09-26 PROCEDURE — 99213 OFFICE O/P EST LOW 20 MIN: CPT | Performed by: FAMILY MEDICINE

## 2019-09-26 PROCEDURE — 73564 X-RAY EXAM KNEE 4 OR MORE: CPT

## 2019-09-26 PROCEDURE — G8427 DOCREV CUR MEDS BY ELIG CLIN: HCPCS | Performed by: FAMILY MEDICINE

## 2019-09-26 RX ORDER — PREDNISONE 20 MG/1
20 TABLET ORAL 2 TIMES DAILY
Qty: 10 TABLET | Refills: 0 | Status: SHIPPED | OUTPATIENT
Start: 2019-09-26 | End: 2019-10-01

## 2019-09-26 ASSESSMENT — ENCOUNTER SYMPTOMS: BACK PAIN: 0

## 2020-03-05 ENCOUNTER — OFFICE VISIT (OUTPATIENT)
Dept: FAMILY MEDICINE CLINIC | Age: 70
End: 2020-03-05
Payer: MEDICARE

## 2020-03-05 VITALS
SYSTOLIC BLOOD PRESSURE: 126 MMHG | HEIGHT: 70 IN | HEART RATE: 64 BPM | BODY MASS INDEX: 25.37 KG/M2 | WEIGHT: 177.2 LBS | DIASTOLIC BLOOD PRESSURE: 78 MMHG

## 2020-03-05 PROCEDURE — 1123F ACP DISCUSS/DSCN MKR DOCD: CPT | Performed by: FAMILY MEDICINE

## 2020-03-05 PROCEDURE — 3017F COLORECTAL CA SCREEN DOC REV: CPT | Performed by: FAMILY MEDICINE

## 2020-03-05 PROCEDURE — G8427 DOCREV CUR MEDS BY ELIG CLIN: HCPCS | Performed by: FAMILY MEDICINE

## 2020-03-05 PROCEDURE — 4040F PNEUMOC VAC/ADMIN/RCVD: CPT | Performed by: FAMILY MEDICINE

## 2020-03-05 PROCEDURE — 1036F TOBACCO NON-USER: CPT | Performed by: FAMILY MEDICINE

## 2020-03-05 PROCEDURE — G8417 CALC BMI ABV UP PARAM F/U: HCPCS | Performed by: FAMILY MEDICINE

## 2020-03-05 PROCEDURE — 99213 OFFICE O/P EST LOW 20 MIN: CPT | Performed by: FAMILY MEDICINE

## 2020-03-05 PROCEDURE — G8484 FLU IMMUNIZE NO ADMIN: HCPCS | Performed by: FAMILY MEDICINE

## 2020-03-05 RX ORDER — KETOCONAZOLE 20 MG/ML
SHAMPOO TOPICAL DAILY PRN
COMMUNITY

## 2020-03-05 RX ORDER — KETOCONAZOLE 20 MG/ML
SHAMPOO TOPICAL EVERY OTHER DAY
Qty: 1 BOTTLE | Refills: 3 | Status: SHIPPED | OUTPATIENT
Start: 2020-03-05 | End: 2020-03-05

## 2020-03-05 RX ORDER — ATORVASTATIN CALCIUM 10 MG/1
10 TABLET, FILM COATED ORAL DAILY
Qty: 90 TABLET | Refills: 1 | Status: ON HOLD | OUTPATIENT
Start: 2020-03-05 | End: 2020-11-23 | Stop reason: HOSPADM

## 2020-03-05 ASSESSMENT — ENCOUNTER SYMPTOMS
SHORTNESS OF BREATH: 0
COUGH: 1
WHEEZING: 0

## 2020-03-05 ASSESSMENT — PATIENT HEALTH QUESTIONNAIRE - PHQ9
1. LITTLE INTEREST OR PLEASURE IN DOING THINGS: 0
SUM OF ALL RESPONSES TO PHQ QUESTIONS 1-9: 0
2. FEELING DOWN, DEPRESSED OR HOPELESS: 0
SUM OF ALL RESPONSES TO PHQ9 QUESTIONS 1 & 2: 0
SUM OF ALL RESPONSES TO PHQ QUESTIONS 1-9: 0

## 2020-03-05 NOTE — PROGRESS NOTES
(MULTIVITAMIN-MINERALS) TABS tablet Take 1 tablet by mouth daily      aspirin EC 81 MG EC tablet Take 81 mg by mouth daily      ketoconazole (NIZORAL) 2 % shampoo Apply topically every other day Apply topically daily as needed.  triamcinolone (KENALOG) 0.025 % cream Apply topically every 4 hours as needed Apply Topically       No current facility-administered medications for this visit. Allergies   Allergen Reactions    Crestor [Rosuvastatin Calcium]     Benadryl [Diphenhydramine] Other (See Comments)     Jitter, hyperactivity if takes more than one day      Health Maintenance   Topic Date Due    Hepatitis C screen  1950    DTaP/Tdap/Td vaccine (1 - Tdap) 06/28/1960    Shingles Vaccine (1 of 2) 06/28/1999    Colon Cancer Screen FIT/FOBT  03/11/2020    Pneumococcal 65+ years Vaccine (2 of 2 - PPSV23) 04/30/2020    Annual Wellness Visit (AWV)  09/05/2020    Lipid screen  09/16/2020    A1C test (Diabetic or Prediabetic)  09/20/2020    Flu vaccine  Completed    Hepatitis A vaccine  Aged Out    Hepatitis B vaccine  Aged Out    Hib vaccine  Aged Out    Meningococcal (ACWY) vaccine  Aged Out       Objective:  /78 (Site: Left Upper Arm, Position: Sitting, Cuff Size: Medium Adult)   Pulse 64   Ht 5' 10\" (1.778 m)   Wt 177 lb 3.2 oz (80.4 kg)   BMI 25.43 kg/m²   Physical Exam  Vitals signs reviewed. Constitutional:       Appearance: He is well-developed. Cardiovascular:      Rate and Rhythm: Normal rate and regular rhythm. Heart sounds: No murmur. Pulmonary:      Effort: Pulmonary effort is normal. No respiratory distress. Breath sounds: Normal breath sounds. No wheezing or rhonchi. Musculoskeletal:      Right lower leg: No edema. Left lower leg: No edema. Neurological:      Mental Status: He is alert and oriented to person, place, and time.    Psychiatric:         Mood and Affect: Mood normal.         Behavior: Behavior normal.       Mild patchy erythema of his scalp. No significant rash behind his ears. Lab Results   Component Value Date    WBC 5.0 09/14/2018    HGB 15.4 09/14/2018    HCT 45.8 09/14/2018     09/14/2018    CHOL 175 09/16/2019    TRIG 171 09/16/2019    HDL 33 09/16/2019    ALT 23 09/16/2019    AST 18 09/16/2019     09/16/2019    K 4.3 09/16/2019     09/16/2019    CREATININE 1.1 09/16/2019    BUN 24 (H) 09/16/2019    CO2 27 09/16/2019    PSA 3.29 (H) 09/16/2019    LABA1C 6.2 09/20/2019       Impression/Plan:  1. Mixed hyperlipidemia  Well-controlled. Chronic condition. Refill medication(s).  in sept. - atorvastatin (LIPITOR) 10 MG tablet; Take 1 tablet by mouth daily  Dispense: 90 tablet; Refill: 1    2. Benign prostatic hyperplasia with nocturia  Chronic. Stable. Continue flomax. Follows with urology. 3. Psoriasis  Chronic. Dermatitis versus possible psoriasis. Refill medication. Will refer to dermatology for further evaluation and management  - ketoconazole (NIZORAL) 2 % shampoo; Apply topically every other day Apply topically daily as needed. Dispense: 1 Bottle; Refill: 3  - Red Nissen, MD, Dermatology, [facility].MECHELLE    4. Colon cancer screening  - POCT Fecal Immunochemical Test (FIT); Future      They voiced understanding. All questions answered. They agreed with treatment plan. See patient instructions for any educational materials that may have been given. Discussed use, benefit, and side effects of prescribed medications. Reviewed health maintenance. (Please note that portions of this note may have been completed with a voice recognition program.  Efforts were made to edit the dictation but occasionally words are mis-transcribed.)    Return in about 6 months (around 9/5/2020) for medicare wellnnss; cholesterol.       Electronically signed by Ronen Richard MD on 3/5/2020 at 8:25 AM

## 2020-03-13 ENCOUNTER — TELEPHONE (OUTPATIENT)
Dept: FAMILY MEDICINE CLINIC | Age: 70
End: 2020-03-13

## 2020-03-13 LAB
CONTROL: PRESENT
HEMOCCULT STL QL: NEGATIVE

## 2020-03-13 PROCEDURE — 82274 ASSAY TEST FOR BLOOD FECAL: CPT | Performed by: FAMILY MEDICINE

## 2020-05-21 RX ORDER — ATORVASTATIN CALCIUM 10 MG/1
10 TABLET, FILM COATED ORAL DAILY
Qty: 90 TABLET | Refills: 1 | OUTPATIENT
Start: 2020-05-21

## 2020-05-21 NOTE — TELEPHONE ENCOUNTER
Sami Fink called requesting a refill on the following medications:  Requested Prescriptions     Pending Prescriptions Disp Refills    atorvastatin (LIPITOR) 10 MG tablet 90 tablet 1     Sig: Take 1 tablet by mouth daily     Pharmacy verified:[facility]  . pv      Date of last visit: 03/05/20  Date of next visit (if applicable): 1/6/5652

## 2020-08-16 ENCOUNTER — HOSPITAL ENCOUNTER (EMERGENCY)
Age: 70
Discharge: HOME | End: 2020-08-16
Attending: EMERGENCY MEDICINE
Payer: MEDICARE

## 2020-08-16 VITALS
WEIGHT: 180 LBS | OXYGEN SATURATION: 97 % | RESPIRATION RATE: 12 BRPM | HEIGHT: 70 IN | TEMPERATURE: 97.8 F | BODY MASS INDEX: 25.77 KG/M2 | DIASTOLIC BLOOD PRESSURE: 90 MMHG | HEART RATE: 54 BPM | SYSTOLIC BLOOD PRESSURE: 164 MMHG

## 2020-08-16 LAB
ALBUMIN SERPL-MCNC: 4.9 G/DL (ref 3.5–5.1)
ALP BLD-CCNC: 112 U/L (ref 38–126)
ALT SERPL-CCNC: 21 U/L (ref 11–66)
ANION GAP SERPL CALCULATED.3IONS-SCNC: 15 MEQ/L (ref 8–16)
AST SERPL-CCNC: 20 U/L (ref 5–40)
BASOPHILS # BLD: 0.8 %
BASOPHILS ABSOLUTE: 0.1 THOU/MM3 (ref 0–0.1)
BILIRUB SERPL-MCNC: 0.4 MG/DL (ref 0.3–1.2)
BILIRUBIN DIRECT: < 0.2 MG/DL (ref 0–0.3)
BUN BLDV-MCNC: 23 MG/DL (ref 7–22)
CALCIUM SERPL-MCNC: 9.6 MG/DL (ref 8.5–10.5)
CHLORIDE BLD-SCNC: 100 MEQ/L (ref 98–111)
CO2: 24 MEQ/L (ref 23–33)
CREAT SERPL-MCNC: 1 MG/DL (ref 0.4–1.2)
EKG ATRIAL RATE: 60 BPM
EKG P AXIS: 25 DEGREES
EKG P-R INTERVAL: 160 MS
EKG Q-T INTERVAL: 472 MS
EKG QRS DURATION: 86 MS
EKG QTC CALCULATION (BAZETT): 472 MS
EKG R AXIS: 27 DEGREES
EKG T AXIS: 80 DEGREES
EKG VENTRICULAR RATE: 60 BPM
EOSINOPHIL # BLD: 2.1 %
EOSINOPHILS ABSOLUTE: 0.2 THOU/MM3 (ref 0–0.4)
ERYTHROCYTE [DISTWIDTH] IN BLOOD BY AUTOMATED COUNT: 12.3 % (ref 11.5–14.5)
ERYTHROCYTE [DISTWIDTH] IN BLOOD BY AUTOMATED COUNT: 40.7 FL (ref 35–45)
GFR SERPL CREATININE-BSD FRML MDRD: 74 ML/MIN/1.73M2
GLUCOSE BLD-MCNC: 157 MG/DL (ref 70–108)
HCT VFR BLD CALC: 45.3 % (ref 42–52)
HEMOGLOBIN: 14.9 GM/DL (ref 14–18)
IMMATURE GRANS (ABS): 0.05 THOU/MM3 (ref 0–0.07)
IMMATURE GRANULOCYTES: 0.6 %
LIPASE: 17.4 U/L (ref 5.6–51.3)
LYMPHOCYTES # BLD: 41.7 %
LYMPHOCYTES ABSOLUTE: 3.5 THOU/MM3 (ref 1–4.8)
MCH RBC QN AUTO: 30 PG (ref 26–33)
MCHC RBC AUTO-ENTMCNC: 32.9 GM/DL (ref 32.2–35.5)
MCV RBC AUTO: 91.3 FL (ref 80–94)
MONOCYTES # BLD: 6.3 %
MONOCYTES ABSOLUTE: 0.5 THOU/MM3 (ref 0.4–1.3)
NUCLEATED RED BLOOD CELLS: 0 /100 WBC
OSMOLALITY CALCULATION: 284.5 MOSMOL/KG (ref 275–300)
PLATELET # BLD: 181 THOU/MM3 (ref 130–400)
PMV BLD AUTO: 12.1 FL (ref 9.4–12.4)
POTASSIUM REFLEX MAGNESIUM: 3.9 MEQ/L (ref 3.5–5.2)
RBC # BLD: 4.96 MILL/MM3 (ref 4.7–6.1)
REASON FOR REJECTION: NORMAL
REJECTED TEST: NORMAL
SEG NEUTROPHILS: 48.5 %
SEGMENTED NEUTROPHILS ABSOLUTE COUNT: 4.1 THOU/MM3 (ref 1.8–7.7)
SODIUM BLD-SCNC: 139 MEQ/L (ref 135–145)
TOTAL PROTEIN: 7.1 G/DL (ref 6.1–8)
TROPONIN T: < 0.01 NG/ML
WBC # BLD: 8.5 THOU/MM3 (ref 4.8–10.8)

## 2020-08-16 PROCEDURE — 80048 BASIC METABOLIC PNL TOTAL CA: CPT

## 2020-08-16 PROCEDURE — 96376 TX/PRO/DX INJ SAME DRUG ADON: CPT

## 2020-08-16 PROCEDURE — 99285 EMERGENCY DEPT VISIT HI MDM: CPT

## 2020-08-16 PROCEDURE — 84484 ASSAY OF TROPONIN QUANT: CPT

## 2020-08-16 PROCEDURE — 85025 COMPLETE CBC W/AUTO DIFF WBC: CPT

## 2020-08-16 PROCEDURE — 6360000002 HC RX W HCPCS: Performed by: STUDENT IN AN ORGANIZED HEALTH CARE EDUCATION/TRAINING PROGRAM

## 2020-08-16 PROCEDURE — 93005 ELECTROCARDIOGRAM TRACING: CPT | Performed by: EMERGENCY MEDICINE

## 2020-08-16 PROCEDURE — 36415 COLL VENOUS BLD VENIPUNCTURE: CPT

## 2020-08-16 PROCEDURE — 83690 ASSAY OF LIPASE: CPT

## 2020-08-16 PROCEDURE — 96374 THER/PROPH/DIAG INJ IV PUSH: CPT

## 2020-08-16 PROCEDURE — 6370000000 HC RX 637 (ALT 250 FOR IP): Performed by: STUDENT IN AN ORGANIZED HEALTH CARE EDUCATION/TRAINING PROGRAM

## 2020-08-16 PROCEDURE — 93010 ELECTROCARDIOGRAM REPORT: CPT | Performed by: INTERNAL MEDICINE

## 2020-08-16 PROCEDURE — 80076 HEPATIC FUNCTION PANEL: CPT

## 2020-08-16 RX ORDER — ONDANSETRON 2 MG/ML
4 INJECTION INTRAMUSCULAR; INTRAVENOUS ONCE
Status: COMPLETED | OUTPATIENT
Start: 2020-08-16 | End: 2020-08-16

## 2020-08-16 RX ORDER — ONDANSETRON 4 MG/1
4 TABLET, FILM COATED ORAL DAILY PRN
Qty: 30 TABLET | Refills: 0 | Status: SHIPPED | OUTPATIENT
Start: 2020-08-16 | End: 2020-09-02

## 2020-08-16 RX ORDER — MECLIZINE HYDROCHLORIDE CHEWABLE TABLETS 25 MG/1
25 TABLET, CHEWABLE ORAL ONCE
Status: COMPLETED | OUTPATIENT
Start: 2020-08-16 | End: 2020-08-16

## 2020-08-16 RX ORDER — MECLIZINE HYDROCHLORIDE 25 MG/1
25 TABLET ORAL 3 TIMES DAILY PRN
Qty: 15 TABLET | Refills: 0 | Status: SHIPPED | OUTPATIENT
Start: 2020-08-16 | End: 2020-08-26

## 2020-08-16 RX ADMIN — ONDANSETRON 4 MG: 2 INJECTION INTRAMUSCULAR; INTRAVENOUS at 10:34

## 2020-08-16 RX ADMIN — MECLIZINE HCL 25 MG: 25 TABLET, CHEWABLE ORAL at 10:36

## 2020-08-16 RX ADMIN — ONDANSETRON 4 MG: 2 INJECTION INTRAMUSCULAR; INTRAVENOUS at 09:25

## 2020-08-16 ASSESSMENT — ENCOUNTER SYMPTOMS
SORE THROAT: 0
VOMITING: 1
RHINORRHEA: 0
SINUS PAIN: 0
ABDOMINAL PAIN: 0
BACK PAIN: 0
SHORTNESS OF BREATH: 0
DIARRHEA: 0
NAUSEA: 1
EYE REDNESS: 0
COUGH: 0

## 2020-08-16 ASSESSMENT — PAIN DESCRIPTION - ORIENTATION: ORIENTATION: MID

## 2020-08-16 ASSESSMENT — PAIN DESCRIPTION - LOCATION: LOCATION: ABDOMEN

## 2020-08-16 ASSESSMENT — PAIN SCALES - GENERAL: PAINLEVEL_OUTOF10: 1

## 2020-08-16 NOTE — ED NOTES
Patient presents to ED with complaint of dizziness, nausea, vomiting and abdominal pain. Patient is actively vomiting yellow liquid during exam.  Dr. Quin Mar at bedside. Orders for Zofran received. EKG obtained. Patient gowned and placed on cardiac monitor.      Zenaida Brown RN  08/16/20 0832

## 2020-08-16 NOTE — ED PROVIDER NOTES
[place] ENCOUNTER          Pt Name: Angel Green  MRN: [id number]  [place] 1950  Date of evaluation: 8/16/2020  Treating Resident Physician: Eloise Braden MD  Supervising Physician: Dr. Holbrook Seen       Chief Complaint   Patient presents with    Dizziness    Emesis    Abdominal Pain     History obtained from the patient and wife. HISTORY OF PRESENT ILLNESS    HPI  Angel Green is a 69 y.o. male who presents to the emergency department for evaluation of dizziness that began at 0800 today. Patient states he woke up this morning currently been and was normal and upon making breakfast began having a spinning sensation and immediately became nauseous and had multiple episodes of vomiting. Patient states she has had one similar episode of this in the past approximately 5 years ago unsure of what he was diagnosed with. This current episode lasted approximately 20 minutes and states it was exacerbated by head movement and laying flat. He denies having any tinnitus, rhinorrhea, headache, changes in his vision or hearing loss. The patient has no other acute complaints at this time. REVIEW OF SYSTEMS   Review of Systems   Constitutional: Negative for chills and fever. HENT: Negative for ear pain, hearing loss, rhinorrhea, sinus pain, sore throat and tinnitus. Eyes: Negative for redness. Respiratory: Negative for cough and shortness of breath. Cardiovascular: Negative for chest pain. Gastrointestinal: Positive for nausea and vomiting. Negative for abdominal pain and diarrhea. Genitourinary: Negative for dysuria. Musculoskeletal: Negative for back pain. Skin: Negative for rash. Neurological: Positive for dizziness. Negative for syncope, facial asymmetry, speech difficulty, weakness, numbness and headaches. Psychiatric/Behavioral: Negative for agitation.          PAST MEDICAL AND SURGICAL HISTORY Past Medical History:   Diagnosis Date    BPH (benign prostatic hyperplasia)     Hepatitis B     Hyperlipidemia     Psoriasis      Past Surgical History:   Procedure Laterality Date    BACK SURGERY  2015    Herniated disk     MOUTH SURGERY  1968         MEDICATIONS   No current facility-administered medications for this encounter. Current Outpatient Medications:     meclizine (ANTIVERT) 25 MG tablet, Take 1 tablet by mouth 3 times daily as needed for Dizziness, Disp: 15 tablet, Rfl: 0    ondansetron (ZOFRAN) 4 MG tablet, Take 1 tablet by mouth daily as needed for Nausea or Vomiting, Disp: 30 tablet, Rfl: 0    atorvastatin (LIPITOR) 10 MG tablet, Take 1 tablet by mouth daily, Disp: 90 tablet, Rfl: 1    tamsulosin (FLOMAX) 0.4 MG capsule, Take 1 capsule by mouth daily, Disp: 90 capsule, Rfl: 3    Multiple Vitamins-Minerals (MULTIVITAMIN-MINERALS) TABS tablet, Take 1 tablet by mouth daily, Disp: , Rfl:     aspirin EC 81 MG EC tablet, Take 81 mg by mouth daily, Disp: , Rfl:     ketoconazole (NIZORAL) 2 % shampoo, Apply topically daily as needed for Itching Apply topically daily as needed. , Disp: , Rfl:     triamcinolone (KENALOG) 0.025 % cream, Apply topically every 4 hours as needed Apply Topically, Disp: , Rfl:       SOCIAL HISTORY     Social History     Social History Narrative    Not on file     Social History     Tobacco Use    Smoking status: Never Smoker    Smokeless tobacco: Never Used   Substance Use Topics    Alcohol use: No    Drug use: No         ALLERGIES     Allergies   Allergen Reactions    Crestor [Rosuvastatin Calcium]     Benadryl [Diphenhydramine] Other (See Comments)     Jitter, hyperactivity if takes more than one day          FAMILY HISTORY     Family History   Problem Relation Age of Onset    High Cholesterol Mother     Emphysema Father     Asthma Father     Cancer Sister     Cancer Brother          PREVIOUS RECORDS   Previous records reviewed:  This is the patient's first time visiting [facility] emergency room. Chata Corbin PHYSICAL EXAM     ED Triage Vitals [08/16/20 0925]   BP Temp Temp Source Pulse Resp SpO2 Height Weight   (!) 176/90 97.8 °F (36.6 °C) Oral 54 20 99 % 5' 10\" (1.778 m) 180 lb (81.6 kg)     Initial vital signs and nursing assessment reviewed and abnormal from Bradycardia. Pulsoximetry is normal per my interpretation. Additional Vital Signs:  Vitals:    08/16/20 1034   BP: (!) 167/90   Pulse: 54   Resp: 16   Temp:    SpO2: 97%       Physical Exam  Constitutional:       Appearance: Normal appearance. HENT:      Head: Normocephalic and atraumatic. Right Ear: External ear normal.      Left Ear: External ear normal.      Nose: Nose normal. No congestion. Mouth/Throat:      Mouth: Mucous membranes are moist.      Pharynx: Oropharynx is clear. Eyes:      General: No scleral icterus. Right eye: No discharge. Left eye: No discharge. Extraocular Movements: Extraocular movements intact. Conjunctiva/sclera: Conjunctivae normal.      Pupils: Pupils are equal, round, and reactive to light. Neck:      Musculoskeletal: Normal range of motion and neck supple. No neck rigidity or muscular tenderness. Cardiovascular:      Rate and Rhythm: Regular rhythm. Bradycardia present. Pulses: Normal pulses. Heart sounds: Normal heart sounds. No murmur. Pulmonary:      Effort: Pulmonary effort is normal. No respiratory distress. Breath sounds: Normal breath sounds. No wheezing or rales. Abdominal:      General: Abdomen is flat. There is no distension. Palpations: Abdomen is soft. Tenderness: There is no abdominal tenderness. There is no guarding or rebound. Musculoskeletal: Normal range of motion. General: No swelling. Right lower leg: No edema. Left lower leg: No edema. Skin:     General: Skin is warm and dry.       Capillary Refill: Capillary refill takes less than 2 seconds. Neurological:      General: No focal deficit present. Mental Status: He is alert and oriented to person, place, and time. Mental status is at baseline. Cranial Nerves: No cranial nerve deficit. Sensory: No sensory deficit. Motor: No weakness. Coordination: Coordination normal.   Psychiatric:         Mood and Affect: Mood normal.           MEDICAL DECISION MAKING   Initial Assessment: 69-year-old male with rapid onset of vertigo at 0800 when turning his head today this a.m. Patient has had one episode of this in the past cannot recall what he was diagnosed with. Suspicion is for benign positional vertigo Adam-Hallpike maneuver was performed revealing right-sided horizontal nystagmus. Epley maneuver was then attempted after this, with only minimal relief of symptoms. Zofran has been ordered for nausea and vomiting management. A physical therapy consult was ordered as well for further evaluation and manipulation. Patient was reevaluated at 1150 and his symptoms were improved greatly. Patient is doing much better and would like to be discharged home. Patient will be given a prescription for meclizine and Zofran for further symptom management at home. Patient will be given a referral for ENT.       ED RESULTS   Laboratory results:  Labs Reviewed   BASIC METABOLIC PANEL W/ REFLEX TO MG FOR LOW K - Abnormal; Notable for the following components:       Result Value    Glucose 157 (*)     BUN 23 (*)     All other components within normal limits   GLOMERULAR FILTRATION RATE, ESTIMATED - Abnormal; Notable for the following components:    Est, Glom Filt Rate 74 (*)     All other components within normal limits   CBC WITH AUTO DIFFERENTIAL   SPECIMEN REJECTION   HEPATIC FUNCTION PANEL   LIPASE   TROPONIN   ANION GAP   OSMOLALITY       Radiologic studies results:  No orders to display       ED Medications administered this visit:   Medications   ondansetron (ZOFRAN) injection 4 mg (4 mg Intravenous Given 8/16/20 0925)   ondansetron (ZOFRAN) injection 4 mg (4 mg Intravenous Given 8/16/20 1034)   meclizine (ANTIVERT) chewable tablet 25 mg (25 mg Oral Given 8/16/20 1036)         ED COURSE      Strict return precautions and follow up instructions were discussed with the patient prior to discharge, with which the patient agrees. MEDICATION CHANGES     New Prescriptions    MECLIZINE (ANTIVERT) 25 MG TABLET    Take 1 tablet by mouth 3 times daily as needed for Dizziness    ONDANSETRON (ZOFRAN) 4 MG TABLET    Take 1 tablet by mouth daily as needed for Nausea or Vomiting         FINAL DISPOSITION     Final diagnoses:   Benign paroxysmal positional vertigo of right ear     Condition: condition: good  Dispo: Discharge to home      This transcription was electronically signed. Parts of this transcriptions may have been dictated by use of voice recognition software and electronically transcribed, and parts may have been transcribed with the assistance of an ED scribe. The transcription may contain errors not detected in proofreading. Please refer to my supervising physician's documentation if my documentation differs.     Electronically Signed: Anisha Brian, 08/16/20, 11:59 AM       Anisha Brian MD  Resident  08/16/20 1167

## 2020-08-17 ENCOUNTER — CARE COORDINATION (OUTPATIENT)
Dept: CARE COORDINATION | Age: 70
End: 2020-08-17

## 2020-08-17 ENCOUNTER — TELEPHONE (OUTPATIENT)
Dept: FAMILY MEDICINE CLINIC | Age: 70
End: 2020-08-17

## 2020-08-17 NOTE — TELEPHONE ENCOUNTER
Patients daughter Josh Spicer (on hippa) calling in for patient because they took him to [facility] ER last night and he was diagnosed with vertigo. Josh Spicer said he was prescribed some meds and is already a little better today. The ER referred him to Dr Huey Diaz for the vertigo but told them to call the pcp for the referral. He has an appt with Dr Alvarenga Samples for 9/2/2020, does he needs to be seen sooner for follow up? Or ok for the referral? Please call them to advise.

## 2020-08-17 NOTE — CARE COORDINATION
Patient contacted regarding recent visit for viral symptoms. This Kirby Montgomery contacted the patient by telephone to perform post discharge call. Verified name and  with patient as identifiers. Provided introduction to self, and reason for call due to viral symptoms of infection and/or exposure to COVID-19. Patient presented to emergency department/flu clinic with complaints of viral symptoms/exposure to COVID. Patient reports symptoms are improving. Due to no new or worsening symptoms the RN CTN/ACM was not notified for escalation. Discussed exposure protocols and quarantine with CDC Guidelines What To Do If You Are Sick    Patient was given an opportunity for questions and concerns. Stay home except to get medical care    Separate yourself from other people and animals in your home    Call ahead before visiting your doctor    Wear a facemask    Cover your coughs and sneezes    Clean your hands often    Avoid sharing personal household items    Clean all high-touch surfaces everyday    Monitor your symptoms  Seek prompt medical attention if your illness is worsening (e.g., difficulty breathing). Before seeking care, call your healthcare provider and tell them that you have, or are being evaluated for, COVID-19. Put on a facemask before you enter the facility. These steps will help the healthcare provider's office to keep other people in the office or waiting room from getting infected or exposed. Ask your healthcare provider to call the local or [facility] health department. Persons who are placed under If you have a medical emergency and need to call 911, notify the dispatch personnel that you have, or are being evaluated for COVID-19. If possible, put on a facemask before emergency medical services arrive. The patient agrees to contact the [facility] exposure line [phone], local health department [place] Department of Health: ([phone]) and PCP office for questions related to their healthcare. Author provided contact information for future reference. Patient/family/caregiver given information for [employer] and agrees to enroll no    I spoke with the patient re: ed visit. Patient was seen and treated for dizziness. Patient states he has had minimal dizziness sine being home. Admits to being fatigue. Taking mediations as prescribed. Denies fever, cough, or SOB. Discussed COVID-19 precautions. I advised the patient to call the community call center at [phone] for COVID 19-like symptoms for further evalaution and instructions. After hours, please call [facility] COVID-19 hotline number  [phone]. Patient states he is going to all PCP office to f/u.

## 2020-09-02 ENCOUNTER — OFFICE VISIT (OUTPATIENT)
Dept: FAMILY MEDICINE CLINIC | Age: 70
End: 2020-09-02
Payer: MEDICARE

## 2020-09-02 VITALS
SYSTOLIC BLOOD PRESSURE: 126 MMHG | TEMPERATURE: 97.3 F | HEIGHT: 70 IN | BODY MASS INDEX: 25.94 KG/M2 | HEART RATE: 69 BPM | WEIGHT: 181.2 LBS | DIASTOLIC BLOOD PRESSURE: 88 MMHG | OXYGEN SATURATION: 97 %

## 2020-09-02 LAB
AVERAGE GLUCOSE: 132 MG/DL (ref 70–126)
CHOLESTEROL, TOTAL: 209 MG/DL (ref 100–199)
HBA1C MFR BLD: 6.4 % (ref 4.4–6.4)
HDLC SERPL-MCNC: 35 MG/DL
LDL CHOLESTEROL CALCULATED: 121 MG/DL
PROSTATE SPECIFIC ANTIGEN: 3.11 NG/ML (ref 0–1)
TRIGL SERPL-MCNC: 264 MG/DL (ref 0–199)

## 2020-09-02 PROCEDURE — 4040F PNEUMOC VAC/ADMIN/RCVD: CPT | Performed by: FAMILY MEDICINE

## 2020-09-02 PROCEDURE — 1123F ACP DISCUSS/DSCN MKR DOCD: CPT | Performed by: FAMILY MEDICINE

## 2020-09-02 PROCEDURE — 3017F COLORECTAL CA SCREEN DOC REV: CPT | Performed by: FAMILY MEDICINE

## 2020-09-02 PROCEDURE — G0009 ADMIN PNEUMOCOCCAL VACCINE: HCPCS | Performed by: FAMILY MEDICINE

## 2020-09-02 PROCEDURE — 90732 PPSV23 VACC 2 YRS+ SUBQ/IM: CPT | Performed by: FAMILY MEDICINE

## 2020-09-02 PROCEDURE — G0439 PPPS, SUBSEQ VISIT: HCPCS | Performed by: FAMILY MEDICINE

## 2020-09-02 ASSESSMENT — PATIENT HEALTH QUESTIONNAIRE - PHQ9
SUM OF ALL RESPONSES TO PHQ QUESTIONS 1-9: 0
SUM OF ALL RESPONSES TO PHQ QUESTIONS 1-9: 0

## 2020-09-02 ASSESSMENT — LIFESTYLE VARIABLES: HOW OFTEN DO YOU HAVE A DRINK CONTAINING ALCOHOL: 0

## 2020-09-02 NOTE — PROGRESS NOTES
Medicare Annual Wellness Visit  Name: Maurice Figueroa Date: 2020   MRN: [id number] Sex: Male   Age: 69 y.o. Ethnicity: Non-/Non    : 1950 Race: Amaya Clarke is here for Medicare AWV and Hyperlipidemia    Screenings for behavioral, psychosocial and functional/safety risks, and cognitive dysfunction are all negative except as indicated below. These results, as well as other patient data from the [address] form, are documented in Flowsheets linked to this Encounter. Allergies   Allergen Reactions    Crestor [Rosuvastatin Calcium]     Benadryl [Diphenhydramine] Other (See Comments)     Jitter, hyperactivity if takes more than one day        Prior to Visit Medications    Medication Sig Taking? Authorizing Provider   atorvastatin (LIPITOR) 10 MG tablet Take 1 tablet by mouth daily Yes Alok Castillo MD   ketoconazole (NIZORAL) 2 % shampoo Apply topically daily as needed for Itching Apply topically daily as needed.  Yes Historical Provider, MD   tamsulosin (FLOMAX) 0.4 MG capsule Take 1 capsule by mouth daily Yes Alok Castillo MD   Multiple Vitamins-Minerals (MULTIVITAMIN-MINERALS) TABS tablet Take 1 tablet by mouth daily Yes Historical Provider, MD   aspirin EC 81 MG EC tablet Take 81 mg by mouth daily Yes Historical Provider, MD   triamcinolone (KENALOG) 0.025 % cream Apply topically every 4 hours as needed Apply Topically Yes Historical Provider, MD   ondansetron (ZOFRAN) 4 MG tablet Take 1 tablet by mouth daily as needed for Nausea or Vomiting  Patient not taking: Reported on 2020  Sariah Fletcher MD       Past Medical History:   Diagnosis Date    BPH (benign prostatic hyperplasia)     Hepatitis B     Hyperlipidemia     Psoriasis        Past Surgical History:   Procedure Laterality Date    BACK SURGERY  2015    Herniated disk     MOUTH SURGERY  1968       Family History   Problem Relation Age of Onset    High Cholesterol Mother    [facility] Emphysema Father     Asthma Father     Cancer Sister     Cancer Brother        CareTeam (Including outside providers/suppliers regularly involved in providing care):   Patient Care Team:  Danita Bacon MD as PCP - General (Family Medicine)  Danita Bacon MD as PCP - [facility] Provider    Wt Readings from Last 3 Encounters:   09/02/20 181 lb 3.2 oz (82.2 kg)   08/16/20 180 lb (81.6 kg)   03/05/20 177 lb 3.2 oz (80.4 kg)     Vitals:    09/02/20 0753   BP: 126/88   Site: Left Upper Arm   Position: Sitting   Pulse: 69   Temp: 97.3 °F (36.3 °C)   SpO2: 97%   Weight: 181 lb 3.2 oz (82.2 kg)   Height: 5' 10\" (1.778 m)     Body mass index is 26 kg/m². Based upon direct observation of the patient, evaluation of cognition reveals recent and remote memory intact. Physical Exam  Vitals signs reviewed. Constitutional:       Appearance: He is well-developed. Cardiovascular:      Rate and Rhythm: Normal rate and regular rhythm. Heart sounds: No murmur. Pulmonary:      Effort: Pulmonary effort is normal. No respiratory distress. Breath sounds: Normal breath sounds. No wheezing or rhonchi. Musculoskeletal:      Right lower leg: No edema. Left lower leg: No edema. Neurological:      Mental Status: He is alert and oriented to person, place, and time. Mental status is at baseline. Psychiatric:         Mood and Affect: Mood normal.         Behavior: Behavior normal.         Patient's complete Health Risk Assessment and screening values have been reviewed and are found in Flowsheets. The following problems were reviewed today and where indicated follow up appointments were made and/or referrals ordered. Positive Risk Factor Screenings with Interventions:     General Health:  General  In general, how would you say your health is?: Very Good  In the past 7 days, have you experienced any of the following?  New or Increased Pain, New or Increased Fatigue, Loneliness, Social Isolation, Stress or Anger?: (!) New or Increased Fatigue  Do you get the social and emotional support that you need?: Yes  Do you have a Living Will?: Yes  General Health Risk Interventions:  · Fatigue: patient declines any further evaluation/treatment for this issue; IMPROVING TIREDNESS    Personalized Preventive Plan   Current Health Maintenance Status  Immunization History   Administered Date(s) Administered    Influenza Virus Vaccine 10/25/2019    Influenza, Triv, inactivated, subunit, adjuvanted, IM (Fluad 65 yrs and older) 09/07/2018    Pneumococcal Conjugate 13-valent (Deborah Veronika) 04/30/2019        Health Maintenance   Topic Date Due    Hepatitis C screen  1950    DTaP/Tdap/Td vaccine (1 - Tdap) 06/28/1968    Shingles Vaccine (1 of 2) 06/28/1999    Pneumococcal 65+ years Vaccine (2 of 2 - PPSV23) 04/30/2020    Flu vaccine (1) 09/01/2020    Annual Wellness Visit (AWV)  09/05/2020    Lipid screen  09/16/2020    A1C test (Diabetic or Prediabetic)  09/20/2020    Colon Cancer Screen FIT/FOBT  03/13/2021    Hepatitis A vaccine  Aged Out    Hepatitis B vaccine  Aged Out    Hib vaccine  Aged Out    Meningococcal (ACWY) vaccine  Aged Out     Recommendations for [employer] Due: see orders and patient instructions/AVS.  . Recommended screening schedule for the next 5-10 years is provided to the patient in written form: see Patient Instructions/AVS.    Yg Bryson was seen today for medicare awv and hyperlipidemia. Diagnoses and all orders for this visit:    Routine general medical examination at a health care facility    Mixed hyperlipidemia  -     Lipid Panel; Future    Elevated PSA  -     PSA Prostatic Specific Antigen; Future    Elevated glucose  -     Hemoglobin A1C; Future    Need for pneumococcal vaccination  -     PNEUMOVAX 23 subcutaneous/IM (Pneumococcal polysaccharide vaccine 23-valent >= 3yo)          He feels feet have extra fat making him lopsided. He feels off balance some.   In ER recently for vertigo. Recommend good supportive walking shoes. Above Problems are deemed controlled and/or labs are being checked for status of control.      F/u 6 months hyperlipidemia      Doc Vinson MD

## 2020-09-02 NOTE — PATIENT INSTRUCTIONS
Personalized Preventive Plan for Eloina Oneal - 9/2/2020  Medicare offers a range of preventive health benefits. Some of the tests and screenings are paid in full while other may be subject to a deductible, co-insurance, and/or copay. Some of these benefits include a comprehensive review of your medical history including lifestyle, illnesses that may run in your family, and various assessments and screenings as appropriate. After reviewing your medical record and screening and assessments performed today your provider may have ordered immunizations, labs, imaging, and/or referrals for you. A list of these orders (if applicable) as well as your Preventive Care list are included within your After Visit Summary for your review. Other Preventive Recommendations:    · A preventive eye exam performed by an eye specialist is recommended every 1-2 years to screen for glaucoma; cataracts, macular degeneration, and other eye disorders. · A preventive dental visit is recommended every 6 months. · Try to get at least 150 minutes of exercise per week or 10,000 steps per day on a pedometer . · Order or download the FREE \"Exercise & Physical Activity: Your Everyday Guide\" from [employer]. Call 8-708.520.4165 or search [employer] online. · You need 1935-3008 mg of calcium and 3360-8430 IU of vitamin D per day. It is possible to meet your calcium requirement with diet alone, but a vitamin D supplement is usually necessary to meet this goal.  · When exposed to the sun, use a sunscreen that protects against both UVA and UVB radiation with an SPF of 30 or greater. Reapply every 2 to 3 hours or after sweating, drying off with a towel, or swimming. · Always wear a seat belt when traveling in a car. Always wear a helmet when riding a bicycle or motorcycle.

## 2020-10-09 ENCOUNTER — OFFICE VISIT (OUTPATIENT)
Dept: UROLOGY | Age: 70
End: 2020-10-09
Payer: MEDICARE

## 2020-10-09 VITALS — TEMPERATURE: 97.3 F | WEIGHT: 183 LBS | BODY MASS INDEX: 26.2 KG/M2 | HEIGHT: 70 IN

## 2020-10-09 LAB
BILIRUBIN URINE: NEGATIVE
BLOOD URINE, POC: NEGATIVE
CHARACTER, URINE: CLEAR
COLOR, URINE: YELLOW
GLUCOSE URINE: NEGATIVE MG/DL
KETONES, URINE: NEGATIVE
LEUKOCYTE CLUMPS, URINE: NEGATIVE
NITRITE, URINE: NEGATIVE
PH, URINE: 7.5 (ref 5–9)
POST VOID RESIDUAL (PVR): 20 ML
PROTEIN, URINE: NEGATIVE MG/DL
SPECIFIC GRAVITY, URINE: 1.02 (ref 1–1.03)
UROBILINOGEN, URINE: 0.2 EU/DL (ref 0–1)

## 2020-10-09 PROCEDURE — G8484 FLU IMMUNIZE NO ADMIN: HCPCS | Performed by: NURSE PRACTITIONER

## 2020-10-09 PROCEDURE — 99213 OFFICE O/P EST LOW 20 MIN: CPT | Performed by: NURSE PRACTITIONER

## 2020-10-09 PROCEDURE — G8417 CALC BMI ABV UP PARAM F/U: HCPCS | Performed by: NURSE PRACTITIONER

## 2020-10-09 PROCEDURE — 51798 US URINE CAPACITY MEASURE: CPT | Performed by: NURSE PRACTITIONER

## 2020-10-09 PROCEDURE — 1036F TOBACCO NON-USER: CPT | Performed by: NURSE PRACTITIONER

## 2020-10-09 PROCEDURE — G8427 DOCREV CUR MEDS BY ELIG CLIN: HCPCS | Performed by: NURSE PRACTITIONER

## 2020-10-09 PROCEDURE — 1123F ACP DISCUSS/DSCN MKR DOCD: CPT | Performed by: NURSE PRACTITIONER

## 2020-10-09 PROCEDURE — 4040F PNEUMOC VAC/ADMIN/RCVD: CPT | Performed by: NURSE PRACTITIONER

## 2020-10-09 PROCEDURE — 81003 URINALYSIS AUTO W/O SCOPE: CPT | Performed by: NURSE PRACTITIONER

## 2020-10-09 PROCEDURE — 3017F COLORECTAL CA SCREEN DOC REV: CPT | Performed by: NURSE PRACTITIONER

## 2020-10-09 RX ORDER — TAMSULOSIN HYDROCHLORIDE 0.4 MG/1
0.4 CAPSULE ORAL DAILY
Qty: 90 CAPSULE | Refills: 3 | Status: SHIPPED | OUTPATIENT
Start: 2020-10-09 | End: 2021-10-08 | Stop reason: SDUPTHER

## 2020-10-09 ASSESSMENT — ENCOUNTER SYMPTOMS
ABDOMINAL PAIN: 0
BACK PAIN: 0

## 2020-10-09 NOTE — PROGRESS NOTES
[address] [facility],[place] [address]  Dept: [phone]  Loc: [phone]    Visit Date: 10/9/2020        HPI:     Rashad Cerna is a 69 y.o. male who presents today for:  Chief Complaint   Patient presents with    Benign Prostatic Hypertrophy     LUTS     Elevated PSA    1 Year Follow Up       HPI   Pt seen in follow up for BPH and elevated PSA. Pt moved to [place] from [place] in 2018 where he was following with a Dr. Maryam Sapp at [facility]. Prior PSAs noted of 3.78 4/28/17 and 2.86 11/30/17. PSA 9/14/18 3. 18. Pt denies any family hx of breast or prostate cancer. No known abnormal TANISHA in the past.  He has been on Flomax 0.4 mg daily since December of 2017. Pt reports urinary symptoms well controlled currently on tamsulosin 0.4 mg daily. Nocturia 2 x per night at most.  IPSS score in office today 11/35. Current Outpatient Medications   Medication Sig Dispense Refill    tamsulosin (FLOMAX) 0.4 MG capsule Take 1 capsule by mouth daily 90 capsule 3    atorvastatin (LIPITOR) 10 MG tablet Take 1 tablet by mouth daily 90 tablet 1    ketoconazole (NIZORAL) 2 % shampoo Apply topically daily as needed for Itching Apply topically daily as needed.  Multiple Vitamins-Minerals (MULTIVITAMIN-MINERALS) TABS tablet Take 1 tablet by mouth daily      aspirin EC 81 MG EC tablet Take 81 mg by mouth daily      triamcinolone (KENALOG) 0.025 % cream Apply topically every 4 hours as needed Apply Topically       No current facility-administered medications for this visit. Past Medical History  Gallardo  has a past medical history of BPH (benign prostatic hyperplasia), Hepatitis B, Hyperlipidemia, and Psoriasis. Past Surgical History  The patient  has a past surgical history that includes back surgery (2015) and Mouth surgery (1968).     Family History  This patient's family history includes Asthma in his father; Cancer in his brother and sister; Emphysema in his father; High Cholesterol in his mother. Social History  Gallardo  reports that he has never smoked. He has never used smokeless tobacco. He reports that he does not drink alcohol or use drugs. Subjective:      Review of Systems   Constitutional: Negative for activity change, appetite change, chills, diaphoresis, fatigue, fever and unexpected weight change. Gastrointestinal: Negative for abdominal pain. Genitourinary: Negative for difficulty urinating, dysuria, flank pain, frequency, hematuria and urgency. Musculoskeletal: Negative for back pain. Objective:   Temp 97.3 °F (36.3 °C)   Ht 5' 10\" (1.778 m)   Wt 183 lb (83 kg)   BMI 26.26 kg/m²     Physical Exam  Constitutional:       General: He is not in acute distress. Appearance: Normal appearance. He is not ill-appearing or diaphoretic. HENT:      Head: Normocephalic and atraumatic. Right Ear: External ear normal.      Left Ear: External ear normal.      Nose: Nose normal.      Mouth/Throat:      Mouth: Mucous membranes are moist.   Eyes:      General: No scleral icterus. Right eye: No discharge. Left eye: No discharge. Pulmonary:      Effort: Pulmonary effort is normal. No respiratory distress. Abdominal:      Tenderness: There is no abdominal tenderness. There is no right CVA tenderness or left CVA tenderness. Genitourinary:     Comments: Prostate moderately enlarged without nodule or induration  Musculoskeletal: Normal range of motion. Skin:     General: Skin is warm and dry. Neurological:      Mental Status: He is alert and oriented to person, place, and time. Mental status is at baseline. Psychiatric:         Mood and Affect: Mood normal.         Behavior: Behavior normal.         Thought Content:  Thought content normal.         POC  Results for POC orders placed in visit on 10/09/20   POCT Urinalysis No Micro (Auto)   Result Value Ref Range    Glucose, Ur Negative NEGATIVE mg/dl    Bilirubin Urine Negative     Ketones, Urine Negative NEGATIVE    Specific Gravity, Urine 1.025 1.002 - 1.030    Blood, UA POC Negative NEGATIVE    pH, Urine 7.50 5.0 - 9.0    Protein, Urine Negative NEGATIVE mg/dl    Urobilinogen, Urine 0.20 0.0 - 1.0 eu/dl    Nitrite, Urine Negative NEGATIVE    Leukocyte Clumps, Urine Negative NEGATIVE    Color, Urine Yellow YELLOW-STRAW    Character, Urine Clear CLR-SL.CLOUD   poct post void residual   Result Value Ref Range    post void residual 20 ml         Patients recent PSA values are as follows  Lab Results   Component Value Date    PSA 3.11 (H) 09/02/2020    PSA 3.29 (H) 09/16/2019    PSA 3.37 (H) 04/03/2019        Recent BUN/Creatinine:  Lab Results   Component Value Date    BUN 23 08/16/2020    CREATININE 1.0 08/16/2020         Assessment:   BPH with LUTs  Hx of elevated PSA    Plan:     Pt's PSA stable at 3.11 and actually trending down over time. TANISHA smooth without nodule or induration. Continue Tamsulosin 0.4 mg daily. Refill sent to pharmacy. F/u in 1 year with PVR. PSA prior to appt.

## 2020-11-12 ENCOUNTER — HOSPITAL ENCOUNTER (OUTPATIENT)
Age: 70
Discharge: HOME | End: 2020-11-12
Payer: MEDICARE

## 2020-11-12 ENCOUNTER — VIRTUAL VISIT (OUTPATIENT)
Dept: FAMILY MEDICINE CLINIC | Age: 70
End: 2020-11-12
Payer: MEDICARE

## 2020-11-12 DIAGNOSIS — R05.9 COUGH: ICD-10-CM

## 2020-11-12 PROCEDURE — 3017F COLORECTAL CA SCREEN DOC REV: CPT | Performed by: FAMILY MEDICINE

## 2020-11-12 PROCEDURE — 99213 OFFICE O/P EST LOW 20 MIN: CPT | Performed by: FAMILY MEDICINE

## 2020-11-12 PROCEDURE — 1123F ACP DISCUSS/DSCN MKR DOCD: CPT | Performed by: FAMILY MEDICINE

## 2020-11-12 PROCEDURE — 4040F PNEUMOC VAC/ADMIN/RCVD: CPT | Performed by: FAMILY MEDICINE

## 2020-11-12 PROCEDURE — G8427 DOCREV CUR MEDS BY ELIG CLIN: HCPCS | Performed by: FAMILY MEDICINE

## 2020-11-12 PROCEDURE — 99211 OFF/OP EST MAY X REQ PHY/QHP: CPT

## 2020-11-12 PROCEDURE — U0003 INFECTIOUS AGENT DETECTION BY NUCLEIC ACID (DNA OR RNA); SEVERE ACUTE RESPIRATORY SYNDROME CORONAVIRUS 2 (SARS-COV-2) (CORONAVIRUS DISEASE [COVID-19]), AMPLIFIED PROBE TECHNIQUE, MAKING USE OF HIGH THROUGHPUT TECHNOLOGIES AS DESCRIBED BY CMS-2020-01-R: HCPCS

## 2020-11-12 RX ORDER — AZITHROMYCIN 250 MG/1
TABLET, FILM COATED ORAL
Qty: 6 TABLET | Refills: 0 | Status: ON HOLD | OUTPATIENT
Start: 2020-11-12 | End: 2020-11-23 | Stop reason: HOSPADM

## 2020-11-12 ASSESSMENT — ENCOUNTER SYMPTOMS
EYE REDNESS: 0
RHINORRHEA: 1
SHORTNESS OF BREATH: 0
CHEST TIGHTNESS: 0
EYE DISCHARGE: 0
COUGH: 1

## 2020-11-12 NOTE — OP NOTE
Outpatient oral pharyngeal COVID swab without difficulty. Patient tolerated well. Patient released with complaint. Patient educated on reviewing results in My Chart.

## 2020-11-12 NOTE — PROGRESS NOTES
[address] [place] [address] Rd, [address]  [employer], [place]  Phone:  [phone]  Fax:  [phone]    2020    TELEHEALTH EVALUATION -- Audio/Visual (During RYWWU-20 public health emergency)    HPI:    Vy Pacheco (:  1950) has requested an audio/video evaluation for the following concern(s):  Cough, chills, fatigue    On  he started with nasal congestion and cough. He has bad headaches. Mild SOB. He developed fevers has been taking Tylenol or Motrin. Tmax today was 101F. Appetite is decreased so he's eating soup and drinking fruit juices. He was a pole worker for the election. Review of Systems   Constitutional: Positive for chills. HENT: Positive for congestion and rhinorrhea. Eyes: Negative for discharge and redness. Respiratory: Positive for cough. Negative for chest tightness and shortness of breath. Prior to Visit Medications    Medication Sig Taking? Authorizing Provider   azithromycin (ZITHROMAX Z-CHAYA) 250 MG tablet Take two (2) tablets by mouth on day 1, then take one (1) tablet by mouth daily for four (4) days. Yes Barbara Rea MD   tamsulosin (FLOMAX) 0.4 MG capsule Take 1 capsule by mouth daily  PRAMOD Dowell - CNP   atorvastatin (LIPITOR) 10 MG tablet Take 1 tablet by mouth daily  Matt Cheney MD   ketoconazole (NIZORAL) 2 % shampoo Apply topically daily as needed for Itching Apply topically daily as needed. Historical Provider, MD   Multiple Vitamins-Minerals (MULTIVITAMIN-MINERALS) TABS tablet Take 1 tablet by mouth daily  Historical Provider, MD   aspirin EC 81 MG EC tablet Take 81 mg by mouth daily  Historical Provider, MD   triamcinolone (KENALOG) 0.025 % cream Apply topically every 4 hours as needed Apply Topically  Historical Provider, MD       Social History     Tobacco Use    Smoking status: Never Smoker    Smokeless tobacco: Never Used   Substance Use Topics    Alcohol use: No    Drug use:  No Allergies   Allergen Reactions    Crestor [Rosuvastatin Calcium]     Benadryl [Diphenhydramine] Other (See Comments)     Jitter, hyperactivity if takes more than one day    ,   Past Medical History:   Diagnosis Date    BPH (benign prostatic hyperplasia)     Hepatitis B     Hyperlipidemia     Psoriasis        PHYSICAL EXAMINATION:    Constitutional: [x] Appears well-developed and well-nourished [x] No apparent distress      [] Abnormal-   Mental status  [x] Alert and awake  [] Oriented to person/place/time []Able to follow commands      Eyes:  EOM    [x]  Normal  [] Abnormal-  Sclera  [x]  Normal  [] Abnormal -         Discharge [x]  None visible  [] Abnormal -    HENT:   [x] Normocephalic, atraumatic. [] Abnormal   [x] Mouth/Throat: Mucous membranes are moist.     External Ears [] Normal  [] Abnormal-     Neck: [] No visualized mass     Pulmonary/Chest: [x] Respiratory effort normal.  [x] No visualized signs of difficulty breathing or respiratory distress        [] Abnormal-      Musculoskeletal:   [] Normal gait with no signs of ataxia         [] Normal range of motion of neck        [] Abnormal-       Neurological:        [] No Facial Asymmetry (Cranial nerve 7 motor function) (limited exam to video visit)          [] No gaze palsy        [] Abnormal-         Skin:        [] No significant exanthematous lesions or discoloration noted on facial skin         [] Abnormal-            Psychiatric:       [] Normal Affect [] No Hallucinations        [] Abnormal-       ASSESSMENT/PLAN:  1. Cough/congestion/headaches  - Symptoms are concerning for COVID-19 so will test today. Advised rest, increased hydration, and OTC symptomatic medication. Will start on antibiotics for sinusitis in case testing is negative. - COVID-19 Ambulatory; Future  - azithromycin (ZITHROMAX Z-CHAYA) 250 MG tablet; Take two (2) tablets by mouth on day 1, then take one (1) tablet by mouth daily for four (4) days.   Dispense: 6 tablet; Refill: 0      Return if symptoms worsen or fail to improve. Chuy Rousseau is a 69 y.o. male being evaluated by a Virtual Visit (video visit) encounter to address concerns as mentioned above. A caregiver was present when appropriate. Due to this being a TeleHealth encounter (During XKAVI-66 public health emergency), evaluation of the following organ systems was limited: Vitals/Constitutional/EENT/Resp/CV/GI//MS/Neuro/Skin/Heme-Lymph-Imm. Pursuant to the emergency declaration under the [address] [place] [address], [address] [place] [address] authority and the [employer] Act, this Virtual Visit was conducted with patient's (and/or legal guardian's) consent, to reduce the patient's risk of exposure to COVID-19 and provide necessary medical care. The patient (and/or legal guardian) has also been advised to contact this office for worsening conditions or problems, and seek emergency medical treatment and/or call 911 if deemed necessary. Patient identification was verified at the start of the visit: Yes    Services were provided through a video synchronous discussion virtually to substitute for in-person clinic visit. Patient and provider were located at their individual homes. --Dariela Montero MD on 11/12/2020 at 2:15 PM    An electronic signature was used to authenticate this note.

## 2020-11-14 LAB — SARS-COV-2: DETECTED

## 2020-11-16 ENCOUNTER — TELEPHONE (OUTPATIENT)
Dept: FAMILY MEDICINE CLINIC | Age: 70
End: 2020-11-16

## 2020-11-16 NOTE — TELEPHONE ENCOUNTER
----- Message from Cheryle Kaiser, MD sent at 11/15/2020  2:09 PM EST -----  COVID test was positive. Quarantine for 10 days from symptom onset.

## 2020-11-17 ENCOUNTER — APPOINTMENT (OUTPATIENT)
Dept: GENERAL RADIOLOGY | Age: 70
DRG: 177 | End: 2020-11-17
Payer: MEDICARE

## 2020-11-17 ENCOUNTER — HOSPITAL ENCOUNTER (INPATIENT)
Age: 70
LOS: 6 days | Discharge: HOME | DRG: 177 | End: 2020-11-23
Attending: FAMILY MEDICINE | Admitting: INTERNAL MEDICINE
Payer: MEDICARE

## 2020-11-17 PROBLEM — U07.1 COVID-19: Status: ACTIVE | Noted: 2020-11-17

## 2020-11-17 LAB
ABO: NORMAL
ALBUMIN SERPL-MCNC: 3.6 G/DL (ref 3.5–5.1)
ALBUMIN SERPL-MCNC: 3.9 G/DL (ref 3.5–5.1)
ALP BLD-CCNC: 113 U/L (ref 38–126)
ALP BLD-CCNC: 93 U/L (ref 38–126)
ALT SERPL-CCNC: 72 U/L (ref 11–66)
ALT SERPL-CCNC: 98 U/L (ref 11–66)
ANION GAP SERPL CALCULATED.3IONS-SCNC: 12 MEQ/L (ref 8–16)
ANION GAP SERPL CALCULATED.3IONS-SCNC: 13 MEQ/L (ref 8–16)
APTT: 33.9 SECONDS (ref 22–38)
AST SERPL-CCNC: 101 U/L (ref 5–40)
AST SERPL-CCNC: 61 U/L (ref 5–40)
BACTERIA: ABNORMAL
BASOPHILS # BLD: 0.3 %
BASOPHILS # BLD: 0.3 %
BASOPHILS ABSOLUTE: 0 THOU/MM3 (ref 0–0.1)
BASOPHILS ABSOLUTE: 0 THOU/MM3 (ref 0–0.1)
BILIRUB SERPL-MCNC: 0.6 MG/DL (ref 0.3–1.2)
BILIRUB SERPL-MCNC: 0.7 MG/DL (ref 0.3–1.2)
BILIRUBIN DIRECT: < 0.2 MG/DL (ref 0–0.3)
BILIRUBIN URINE: NEGATIVE
BLOOD, URINE: NEGATIVE
BUN BLDV-MCNC: 27 MG/DL (ref 7–22)
BUN BLDV-MCNC: 30 MG/DL (ref 7–22)
C-REACTIVE PROTEIN: 8.25 MG/DL (ref 0–1)
CALCIUM SERPL-MCNC: 8.9 MG/DL (ref 8.5–10.5)
CALCIUM SERPL-MCNC: 8.9 MG/DL (ref 8.5–10.5)
CASTS: ABNORMAL /LPF
CASTS: ABNORMAL /LPF
CHARACTER, URINE: CLEAR
CHLORIDE BLD-SCNC: 97 MEQ/L (ref 98–111)
CHLORIDE BLD-SCNC: 97 MEQ/L (ref 98–111)
CO2: 24 MEQ/L (ref 23–33)
CO2: 27 MEQ/L (ref 23–33)
COLOR: YELLOW
CREAT SERPL-MCNC: 1 MG/DL (ref 0.4–1.2)
CREAT SERPL-MCNC: 1.1 MG/DL (ref 0.4–1.2)
CRYSTALS: ABNORMAL
D-DIMER QUANTITATIVE: 250 NG/ML FEU (ref 0–500)
EKG ATRIAL RATE: 68 BPM
EKG P AXIS: 33 DEGREES
EKG P-R INTERVAL: 152 MS
EKG Q-T INTERVAL: 412 MS
EKG QRS DURATION: 82 MS
EKG QTC CALCULATION (BAZETT): 438 MS
EKG R AXIS: 9 DEGREES
EKG T AXIS: 43 DEGREES
EKG VENTRICULAR RATE: 68 BPM
EOSINOPHIL # BLD: 0.1 %
EOSINOPHIL # BLD: 0.1 %
EOSINOPHILS ABSOLUTE: 0 THOU/MM3 (ref 0–0.4)
EOSINOPHILS ABSOLUTE: 0 THOU/MM3 (ref 0–0.4)
EPITHELIAL CELLS, UA: ABNORMAL /HPF
ERYTHROCYTE [DISTWIDTH] IN BLOOD BY AUTOMATED COUNT: 12.5 % (ref 11.5–14.5)
ERYTHROCYTE [DISTWIDTH] IN BLOOD BY AUTOMATED COUNT: 12.6 % (ref 11.5–14.5)
ERYTHROCYTE [DISTWIDTH] IN BLOOD BY AUTOMATED COUNT: 41.5 FL (ref 35–45)
ERYTHROCYTE [DISTWIDTH] IN BLOOD BY AUTOMATED COUNT: 43 FL (ref 35–45)
FERRITIN: 1704 NG/ML (ref 22–322)
FLU A ANTIGEN: POSITIVE
FLU B ANTIGEN: NEGATIVE
GFR SERPL CREATININE-BSD FRML MDRD: 66 ML/MIN/1.73M2
GFR SERPL CREATININE-BSD FRML MDRD: 74 ML/MIN/1.73M2
GLUCOSE BLD-MCNC: 107 MG/DL (ref 70–108)
GLUCOSE BLD-MCNC: 130 MG/DL (ref 70–108)
GLUCOSE, URINE: NEGATIVE MG/DL
HCT VFR BLD CALC: 41.1 % (ref 42–52)
HCT VFR BLD CALC: 43.9 % (ref 42–52)
HEMOGLOBIN: 14 GM/DL (ref 14–18)
HEMOGLOBIN: 14.6 GM/DL (ref 14–18)
IMMATURE GRANS (ABS): 0.02 THOU/MM3 (ref 0–0.07)
IMMATURE GRANS (ABS): 0.03 THOU/MM3 (ref 0–0.07)
IMMATURE GRANULOCYTES: 0.3 %
IMMATURE GRANULOCYTES: 0.4 %
INR BLD: 1.26 (ref 0.85–1.13)
INR BLD: 1.26 (ref 0.85–1.13)
KETONES, URINE: NEGATIVE
LACTIC ACID: 1.2 MMOL/L (ref 0.5–2.2)
LD: 289 U/L (ref 100–190)
LEUKOCYTE ESTERASE, URINE: NEGATIVE
LIPASE: 27.4 U/L (ref 5.6–51.3)
LYMPHOCYTES # BLD: 16.1 %
LYMPHOCYTES # BLD: 20.6 %
LYMPHOCYTES ABSOLUTE: 1.2 THOU/MM3 (ref 1–4.8)
LYMPHOCYTES ABSOLUTE: 1.5 THOU/MM3 (ref 1–4.8)
MCH RBC QN AUTO: 30.8 PG (ref 26–33)
MCH RBC QN AUTO: 30.9 PG (ref 26–33)
MCHC RBC AUTO-ENTMCNC: 33.3 GM/DL (ref 32.2–35.5)
MCHC RBC AUTO-ENTMCNC: 34.1 GM/DL (ref 32.2–35.5)
MCV RBC AUTO: 90.3 FL (ref 80–94)
MCV RBC AUTO: 92.8 FL (ref 80–94)
MISCELLANEOUS LAB TEST RESULT: ABNORMAL
MONOCYTES # BLD: 5 %
MONOCYTES # BLD: 5.1 %
MONOCYTES ABSOLUTE: 0.4 THOU/MM3 (ref 0.4–1.3)
MONOCYTES ABSOLUTE: 0.4 THOU/MM3 (ref 0.4–1.3)
NITRITE, URINE: NEGATIVE
NUCLEATED RED BLOOD CELLS: 0 /100 WBC
NUCLEATED RED BLOOD CELLS: 0 /100 WBC
OSMOLALITY CALCULATION: 276.2 MOSMOL/KG (ref 275–300)
OSMOLALITY CALCULATION: 277.5 MOSMOL/KG (ref 275–300)
PH UA: 6 (ref 5–9)
PLATELET # BLD: 149 THOU/MM3 (ref 130–400)
PLATELET # BLD: 152 THOU/MM3 (ref 130–400)
PMV BLD AUTO: 11.6 FL (ref 9.4–12.4)
PMV BLD AUTO: 11.8 FL (ref 9.4–12.4)
POTASSIUM REFLEX MAGNESIUM: 4.3 MEQ/L (ref 3.5–5.2)
POTASSIUM SERPL-SCNC: 3.9 MEQ/L (ref 3.5–5.2)
PROCALCITONIN: 0.15 NG/ML (ref 0.01–0.09)
PROTEIN UA: 100 MG/DL
RBC # BLD: 4.55 MILL/MM3 (ref 4.7–6.1)
RBC # BLD: 4.73 MILL/MM3 (ref 4.7–6.1)
RBC URINE: ABNORMAL /HPF
RENAL EPITHELIAL, UA: ABNORMAL
RH FACTOR: NORMAL
SEG NEUTROPHILS: 73.6 %
SEG NEUTROPHILS: 78.1 %
SEGMENTED NEUTROPHILS ABSOLUTE COUNT: 5.5 THOU/MM3 (ref 1.8–7.7)
SEGMENTED NEUTROPHILS ABSOLUTE COUNT: 6 THOU/MM3 (ref 1.8–7.7)
SODIUM BLD-SCNC: 134 MEQ/L (ref 135–145)
SODIUM BLD-SCNC: 136 MEQ/L (ref 135–145)
SPECIFIC GRAVITY UA: 1.02 (ref 1–1.03)
TOTAL PROTEIN: 6.1 G/DL (ref 6.1–8)
TOTAL PROTEIN: 6.5 G/DL (ref 6.1–8)
TROPONIN T: < 0.01 NG/ML
UROBILINOGEN, URINE: 1 EU/DL (ref 0–1)
VITAMIN D 25-HYDROXY: 34 NG/ML (ref 30–100)
WBC # BLD: 7.5 THOU/MM3 (ref 4.8–10.8)
WBC # BLD: 7.7 THOU/MM3 (ref 4.8–10.8)
WBC UA: ABNORMAL /HPF
YEAST: ABNORMAL

## 2020-11-17 PROCEDURE — 84484 ASSAY OF TROPONIN QUANT: CPT

## 2020-11-17 PROCEDURE — 85730 THROMBOPLASTIN TIME PARTIAL: CPT

## 2020-11-17 PROCEDURE — 83690 ASSAY OF LIPASE: CPT

## 2020-11-17 PROCEDURE — 6360000002 HC RX W HCPCS: Performed by: FAMILY MEDICINE

## 2020-11-17 PROCEDURE — 82728 ASSAY OF FERRITIN: CPT

## 2020-11-17 PROCEDURE — 86140 C-REACTIVE PROTEIN: CPT

## 2020-11-17 PROCEDURE — 6370000000 HC RX 637 (ALT 250 FOR IP): Performed by: STUDENT IN AN ORGANIZED HEALTH CARE EDUCATION/TRAINING PROGRAM

## 2020-11-17 PROCEDURE — 36430 TRANSFUSION BLD/BLD COMPNT: CPT

## 2020-11-17 PROCEDURE — 82248 BILIRUBIN DIRECT: CPT

## 2020-11-17 PROCEDURE — 86901 BLOOD TYPING SEROLOGIC RH(D): CPT

## 2020-11-17 PROCEDURE — 71045 X-RAY EXAM CHEST 1 VIEW: CPT

## 2020-11-17 PROCEDURE — 87040 BLOOD CULTURE FOR BACTERIA: CPT

## 2020-11-17 PROCEDURE — 99283 EMERGENCY DEPT VISIT LOW MDM: CPT

## 2020-11-17 PROCEDURE — 6370000000 HC RX 637 (ALT 250 FOR IP): Performed by: FAMILY MEDICINE

## 2020-11-17 PROCEDURE — 83615 LACTATE (LD) (LDH) ENZYME: CPT

## 2020-11-17 PROCEDURE — 96374 THER/PROPH/DIAG INJ IV PUSH: CPT

## 2020-11-17 PROCEDURE — 36415 COLL VENOUS BLD VENIPUNCTURE: CPT

## 2020-11-17 PROCEDURE — 6360000002 HC RX W HCPCS: Performed by: STUDENT IN AN ORGANIZED HEALTH CARE EDUCATION/TRAINING PROGRAM

## 2020-11-17 PROCEDURE — 86900 BLOOD TYPING SEROLOGIC ABO: CPT

## 2020-11-17 PROCEDURE — 83605 ASSAY OF LACTIC ACID: CPT

## 2020-11-17 PROCEDURE — 93005 ELECTROCARDIOGRAM TRACING: CPT | Performed by: FAMILY MEDICINE

## 2020-11-17 PROCEDURE — 87804 INFLUENZA ASSAY W/OPTIC: CPT

## 2020-11-17 PROCEDURE — 94760 N-INVAS EAR/PLS OXIMETRY 1: CPT

## 2020-11-17 PROCEDURE — 84145 PROCALCITONIN (PCT): CPT

## 2020-11-17 PROCEDURE — P9059 PLASMA, FRZ BETWEEN 8-24HOUR: HCPCS

## 2020-11-17 PROCEDURE — 1200000003 HC TELEMETRY R&B

## 2020-11-17 PROCEDURE — 2580000003 HC RX 258: Performed by: FAMILY MEDICINE

## 2020-11-17 PROCEDURE — 80053 COMPREHEN METABOLIC PANEL: CPT

## 2020-11-17 PROCEDURE — 87086 URINE CULTURE/COLONY COUNT: CPT

## 2020-11-17 PROCEDURE — 85610 PROTHROMBIN TIME: CPT

## 2020-11-17 PROCEDURE — 81001 URINALYSIS AUTO W/SCOPE: CPT

## 2020-11-17 PROCEDURE — 87449 NOS EACH ORGANISM AG IA: CPT

## 2020-11-17 PROCEDURE — 6370000000 HC RX 637 (ALT 250 FOR IP): Performed by: INTERNAL MEDICINE

## 2020-11-17 PROCEDURE — 87899 AGENT NOS ASSAY W/OPTIC: CPT

## 2020-11-17 PROCEDURE — 82306 VITAMIN D 25 HYDROXY: CPT

## 2020-11-17 PROCEDURE — 85025 COMPLETE CBC W/AUTO DIFF WBC: CPT

## 2020-11-17 PROCEDURE — 85379 FIBRIN DEGRADATION QUANT: CPT

## 2020-11-17 RX ORDER — SODIUM CHLORIDE 0.9 % (FLUSH) 0.9 %
10 SYRINGE (ML) INJECTION PRN
Status: DISCONTINUED | OUTPATIENT
Start: 2020-11-17 | End: 2020-11-23 | Stop reason: HOSPADM

## 2020-11-17 RX ORDER — VITAMIN B COMPLEX
2000 TABLET ORAL DAILY
Status: DISCONTINUED | OUTPATIENT
Start: 2020-11-17 | End: 2020-11-23 | Stop reason: HOSPADM

## 2020-11-17 RX ORDER — ASCORBIC ACID 500 MG
1000 TABLET ORAL DAILY
Status: DISCONTINUED | OUTPATIENT
Start: 2020-11-17 | End: 2020-11-23 | Stop reason: HOSPADM

## 2020-11-17 RX ORDER — PROMETHAZINE HYDROCHLORIDE 25 MG/1
12.5 TABLET ORAL EVERY 6 HOURS PRN
Status: DISCONTINUED | OUTPATIENT
Start: 2020-11-17 | End: 2020-11-23 | Stop reason: HOSPADM

## 2020-11-17 RX ORDER — ASPIRIN 81 MG/1
81 TABLET ORAL DAILY
Status: DISCONTINUED | OUTPATIENT
Start: 2020-11-17 | End: 2020-11-23 | Stop reason: HOSPADM

## 2020-11-17 RX ORDER — OSELTAMIVIR PHOSPHATE 75 MG/1
75 CAPSULE ORAL ONCE
Status: COMPLETED | OUTPATIENT
Start: 2020-11-17 | End: 2020-11-17

## 2020-11-17 RX ORDER — KETOCONAZOLE 20 MG/ML
SHAMPOO TOPICAL DAILY PRN
Status: DISCONTINUED | OUTPATIENT
Start: 2020-11-17 | End: 2020-11-23 | Stop reason: HOSPADM

## 2020-11-17 RX ORDER — MAGNESIUM SULFATE IN WATER 40 MG/ML
2 INJECTION, SOLUTION INTRAVENOUS PRN
Status: DISCONTINUED | OUTPATIENT
Start: 2020-11-17 | End: 2020-11-23 | Stop reason: HOSPADM

## 2020-11-17 RX ORDER — DEXAMETHASONE 4 MG/1
6 TABLET ORAL DAILY
Status: DISCONTINUED | OUTPATIENT
Start: 2020-11-17 | End: 2020-11-17

## 2020-11-17 RX ORDER — DEXAMETHASONE SODIUM PHOSPHATE 4 MG/ML
6 INJECTION, SOLUTION INTRA-ARTICULAR; INTRALESIONAL; INTRAMUSCULAR; INTRAVENOUS; SOFT TISSUE ONCE
Status: COMPLETED | OUTPATIENT
Start: 2020-11-17 | End: 2020-11-17

## 2020-11-17 RX ORDER — OSELTAMIVIR PHOSPHATE 75 MG/1
75 CAPSULE ORAL 2 TIMES DAILY
Status: COMPLETED | OUTPATIENT
Start: 2020-11-17 | End: 2020-11-22

## 2020-11-17 RX ORDER — SODIUM CHLORIDE 9 MG/ML
INJECTION, SOLUTION INTRAVENOUS CONTINUOUS
Status: DISCONTINUED | OUTPATIENT
Start: 2020-11-17 | End: 2020-11-17

## 2020-11-17 RX ORDER — TAMSULOSIN HYDROCHLORIDE 0.4 MG/1
0.4 CAPSULE ORAL DAILY
Status: DISCONTINUED | OUTPATIENT
Start: 2020-11-17 | End: 2020-11-23 | Stop reason: HOSPADM

## 2020-11-17 RX ORDER — ATORVASTATIN CALCIUM 10 MG/1
10 TABLET, FILM COATED ORAL DAILY
Status: DISCONTINUED | OUTPATIENT
Start: 2020-11-17 | End: 2020-11-23 | Stop reason: HOSPADM

## 2020-11-17 RX ORDER — SODIUM CHLORIDE 0.9 % (FLUSH) 0.9 %
10 SYRINGE (ML) INJECTION EVERY 12 HOURS SCHEDULED
Status: DISCONTINUED | OUTPATIENT
Start: 2020-11-17 | End: 2020-11-23 | Stop reason: HOSPADM

## 2020-11-17 RX ORDER — POLYETHYLENE GLYCOL 3350 17 G/17G
17 POWDER, FOR SOLUTION ORAL DAILY PRN
Status: DISCONTINUED | OUTPATIENT
Start: 2020-11-17 | End: 2020-11-23 | Stop reason: HOSPADM

## 2020-11-17 RX ORDER — 0.9 % SODIUM CHLORIDE 0.9 %
20 INTRAVENOUS SOLUTION INTRAVENOUS ONCE
Status: COMPLETED | OUTPATIENT
Start: 2020-11-17 | End: 2020-11-18

## 2020-11-17 RX ORDER — GUAIFENESIN/DEXTROMETHORPHAN 100-10MG/5
5 SYRUP ORAL EVERY 4 HOURS PRN
Status: DISCONTINUED | OUTPATIENT
Start: 2020-11-17 | End: 2020-11-23 | Stop reason: HOSPADM

## 2020-11-17 RX ORDER — POTASSIUM CHLORIDE 7.45 MG/ML
10 INJECTION INTRAVENOUS PRN
Status: DISCONTINUED | OUTPATIENT
Start: 2020-11-17 | End: 2020-11-23 | Stop reason: HOSPADM

## 2020-11-17 RX ORDER — ZINC SULFATE 50(220)MG
50 CAPSULE ORAL DAILY
Status: DISCONTINUED | OUTPATIENT
Start: 2020-11-17 | End: 2020-11-23 | Stop reason: HOSPADM

## 2020-11-17 RX ORDER — ACETAMINOPHEN 650 MG/1
650 SUPPOSITORY RECTAL EVERY 6 HOURS PRN
Status: DISCONTINUED | OUTPATIENT
Start: 2020-11-17 | End: 2020-11-23 | Stop reason: HOSPADM

## 2020-11-17 RX ORDER — SODIUM CHLORIDE 9 MG/ML
INJECTION, SOLUTION INTRAVENOUS CONTINUOUS
Status: DISCONTINUED | OUTPATIENT
Start: 2020-11-17 | End: 2020-11-23 | Stop reason: HOSPADM

## 2020-11-17 RX ORDER — DEXAMETHASONE 4 MG/1
6 TABLET ORAL DAILY
Status: DISCONTINUED | OUTPATIENT
Start: 2020-11-18 | End: 2020-11-23 | Stop reason: HOSPADM

## 2020-11-17 RX ORDER — ONDANSETRON 2 MG/ML
4 INJECTION INTRAMUSCULAR; INTRAVENOUS EVERY 6 HOURS PRN
Status: DISCONTINUED | OUTPATIENT
Start: 2020-11-17 | End: 2020-11-23 | Stop reason: HOSPADM

## 2020-11-17 RX ORDER — ACETAMINOPHEN 325 MG/1
650 TABLET ORAL EVERY 6 HOURS PRN
Status: DISCONTINUED | OUTPATIENT
Start: 2020-11-17 | End: 2020-11-23 | Stop reason: HOSPADM

## 2020-11-17 RX ADMIN — ENOXAPARIN SODIUM 30 MG: 30 INJECTION SUBCUTANEOUS at 20:41

## 2020-11-17 RX ADMIN — OSELTAMIVIR PHOSPHATE 75 MG: 75 CAPSULE ORAL at 14:55

## 2020-11-17 RX ADMIN — ATORVASTATIN CALCIUM 10 MG: 10 TABLET, FILM COATED ORAL at 20:41

## 2020-11-17 RX ADMIN — OXYCODONE HYDROCHLORIDE AND ACETAMINOPHEN 1000 MG: 500 TABLET ORAL at 22:01

## 2020-11-17 RX ADMIN — SODIUM CHLORIDE: 9 INJECTION, SOLUTION INTRAVENOUS at 23:08

## 2020-11-17 RX ADMIN — OSELTAMIVIR PHOSPHATE 75 MG: 75 CAPSULE ORAL at 20:42

## 2020-11-17 RX ADMIN — TAMSULOSIN HYDROCHLORIDE 0.4 MG: 0.4 CAPSULE ORAL at 17:32

## 2020-11-17 RX ADMIN — SODIUM CHLORIDE: 9 INJECTION, SOLUTION INTRAVENOUS at 13:14

## 2020-11-17 RX ADMIN — DEXAMETHASONE SODIUM PHOSPHATE 6 MG: 4 INJECTION, SOLUTION INTRA-ARTICULAR; INTRALESIONAL; INTRAMUSCULAR; INTRAVENOUS; SOFT TISSUE at 14:57

## 2020-11-17 RX ADMIN — Medication 50 MG: at 22:01

## 2020-11-17 RX ADMIN — Medication 2000 UNITS: at 22:01

## 2020-11-17 RX ADMIN — FLUOCINONIDE: 0.5 CREAM TOPICAL at 17:33

## 2020-11-17 RX ADMIN — ASPIRIN 81 MG: 81 TABLET, COATED ORAL at 17:32

## 2020-11-17 ASSESSMENT — ENCOUNTER SYMPTOMS
DIARRHEA: 0
SHORTNESS OF BREATH: 1
EYE DISCHARGE: 0
ABDOMINAL PAIN: 0
COUGH: 1

## 2020-11-17 ASSESSMENT — PAIN SCALES - GENERAL
PAINLEVEL_OUTOF10: 0
PAINLEVEL_OUTOF10: 0

## 2020-11-17 NOTE — ED NOTES
ED to inpatient nurses report    Chief Complaint   Patient presents with    Shortness of Breath     covid +      Present to ED from home  LOC: alert and orientated to name, place, date  Vital signs   Vitals:    11/17/20 1303 11/17/20 1526 11/17/20 1620 11/17/20 1734   BP: 121/77 137/78  132/78   Pulse: 66 67  72   Resp: 17 20 18   Temp: 98.4 °F (36.9 °C)      TempSrc: Oral      SpO2: 95% 94% 94% 94%   Weight:       Height:          Oxygen Baseline room air WNL    Current needs required room air WNL Bipap/Cpap No  LDAs:   Peripheral IV 11/17/20 Right Forearm (Active)   Site Assessment Clean;Dry; Intact 11/17/20 1735   Line Status Blood return noted; Flushed; Infusing 11/17/20 1735   Dressing Status Clean;Dry; Intact 11/17/20 1735   Dressing Intervention New 11/17/20 1735     Mobility: Requires assistance * 2  Pending ED orders: none at this time  Present condition: patient resting in cot, talking with daughter at bedside. No concerns at this time. Call light in reach. Will continue monitor.    Person of contract Severa Skyler, phone number: please see promise card  Our promise was given to patient    Electronically signed by Refugio Gentile RN on 11/17/2020 at 6:09 PM     Refugio Gentile RN  11/17/20 0562

## 2020-11-17 NOTE — ED NOTES
Patient lying in bed with blankets watching tv at this time. Patient respirations are regular and unlabored. Patient appears to be in no current distress. Patient VSS. Call light is within reach. Patient expresses no needs at this time.        Dejuan Locke RN  11/17/20 0118

## 2020-11-17 NOTE — ACP (ADVANCE CARE PLANNING)
orders. Length of ACP Conversation in minutes:  44  Conversation Outcomes:  [x] ACP discussion completed  [x] Existing advance directive reviewed with patient; no changes to patient's previously recorded wishes  [x] New Advance Directive completed  [] Portable Do Not Rescitate prepared for Provider review and signature  [] POLST/POST/MOLST/MOST prepared for Provider review and signature      Follow-up plan:    [] Schedule follow-up conversation to continue planning  [] Referred individual to Provider for additional questions/concerns   [] Advised patient/agent/surrogate to review completed ACP document and update if needed with changes in condition, patient preferences or care setting    [] This note routed to one or more involved healthcare providers     - Patient appeared to be very tired. Was struggling to answer the questions directed to him. His daughter Krystle Lowery) was in the room with him and shared that he has progressively gotten worse over the last week. - Reviewed his Living Will and he asked if we could complete the POA so his children are capable of making health care decisions according to his choices. - New POA Documents completed, original returned to him. Copies placed in Medical Recodes and also provided to his children.   - No further follow-up is needed at this time.

## 2020-11-17 NOTE — ED PROVIDER NOTES
[facility]  eMERGENCY dEPARTMENT eNCOUnter          CHIEF COMPLAINT       Chief Complaint   Patient presents with    Shortness of Breath     covid +       Nurses Notes reviewed and I agree except as noted in the HPI. HISTORY OF PRESENT ILLNESS    Randa Lott is a 69 y.o. male who presents with shortness of breath    Location/Symptom: Shortness of breath  Timing/Onset: About 12 days ago  Context/Setting: Been sick for 12 days with congestion cough  Intermittent fevers  Weakness  Outpatient Covid-19 test positive    Quality: Tired fatigue  Generalized weakness  Intermittent fever  Some cough  Some shortness of breath  No diarrhea  No change in taste or smell  Duration: 12 days  Modifying Factors: Tylenol  Severity: 6/10      REVIEW OF SYSTEMS     Review of Systems   Constitutional: Positive for fatigue and fever. HENT: Positive for congestion. Eyes: Negative for discharge. Respiratory: Positive for cough and shortness of breath. Cardiovascular: Negative for chest pain. Gastrointestinal: Negative for abdominal pain and diarrhea. Genitourinary: Negative for dysuria. Musculoskeletal: Positive for myalgias. Skin: Negative for rash. Neurological: Positive for headaches. Mild headache   Hematological: Negative for adenopathy. Does not bruise/bleed easily. Psychiatric/Behavioral: Negative for confusion. PAST MEDICAL HISTORY    has a past medical history of BPH (benign prostatic hyperplasia), Hepatitis B, Hyperlipidemia, and Psoriasis. SURGICAL HISTORY      has a past surgical history that includes back surgery (2015) and Mouth surgery (1968).     CURRENT MEDICATIONS       Previous Medications    ASPIRIN EC 81 MG EC TABLET    Take 81 mg by mouth daily    ATORVASTATIN (LIPITOR) 10 MG TABLET    Take 1 tablet by mouth daily    AZITHROMYCIN (ZITHROMAX Z-CHAYA) 250 MG TABLET    Take two (2) tablets by mouth on day 1, then take one (1) tablet by mouth daily for four (4) days.    KETOCONAZOLE (NIZORAL) 2 % SHAMPOO    Apply topically daily as needed for Itching Apply topically daily as needed. MULTIPLE VITAMINS-MINERALS (MULTIVITAMIN-MINERALS) TABS TABLET    Take 1 tablet by mouth daily    TAMSULOSIN (FLOMAX) 0.4 MG CAPSULE    Take 1 capsule by mouth daily    TRIAMCINOLONE (KENALOG) 0.025 % CREAM    Apply topically every 4 hours as needed Apply Topically       ALLERGIES     is allergic to crestor [rosuvastatin calcium] and benadryl [diphenhydramine]. FAMILY HISTORY     He indicated that his mother is . He indicated that his father is . He indicated that his sister is . He indicated that his brother is . family history includes Asthma in his father; Cancer in his brother and sister; Emphysema in his father; High Cholesterol in his mother. SOCIAL HISTORY      reports that he has never smoked. He has never used smokeless tobacco. He reports that he does not drink alcohol or use drugs. PHYSICAL EXAM     INITIAL VITALS:  height is 5' 10\" (1.778 m) and weight is 185 lb (83.9 kg). His oral temperature is 98.4 °F (36.9 °C). His blood pressure is 121/77 and his pulse is 66. His respiration is 17 and oxygen saturation is 95%. Physical Exam  Vitals signs and nursing note reviewed. Constitutional:       Comments: GCS 15   HENT:      Head: Normocephalic and atraumatic. Eyes:      General: No scleral icterus. Extraocular Movements: Extraocular movements intact. Pupils: Pupils are equal, round, and reactive to light. Neck:      Musculoskeletal: Normal range of motion and neck supple. Comments: No JVD  Cardiovascular:      Rate and Rhythm: Normal rate and regular rhythm. Pulses: Normal pulses. Heart sounds: Normal heart sounds. Pulmonary:      Breath sounds: Rales present. Comments: Crackles posteriorly especially right  Abdominal:      Palpations: Abdomen is soft. Tenderness: There is no abdominal tenderness. There is no guarding or rebound. Musculoskeletal:         General: No swelling. Skin:     General: Skin is warm and dry. Neurological:      General: No focal deficit present. Mental Status: He is alert. Psychiatric:         Mood and Affect: Mood normal.         Behavior: Behavior normal.           DIFFERENTIAL DIAGNOSIS:     Shortness of breath, Covid-19+    Crackles no chest evaluate for pneumonia    Check hydration    DIAGNOSTIC RESULTS     EKG: All EKG's are interpreted by the Emergency Department Physician who either signs or Co-signs this chart in the absence of a cardiologist.    EKG showed sinus rhythm with rate 68. QRS complexes show normal axis, normal conduction.   ST-T waves show no acute change        RADIOLOGY: non-plain film images(s) such as CT, Ultrasound and MRI are read by the radiologist.  The patient had a single view X-ray of the chest which demonstrates bilateral patchy changes typical Covid per my interpretation    [x] Visualized and interpreted by me   [x] Radiologist's Wet Read Report Reviewed   [] Discussed with Radiologist.    LABS:   Labs Reviewed   RAPID INFLUENZA A/B ANTIGENS - Abnormal; Notable for the following components:       Result Value    Flu A Antigen Positive (*)     All other components within normal limits   PROTIME-INR - Abnormal; Notable for the following components:    INR 1.26 (*)     All other components within normal limits   CBC WITH AUTO DIFFERENTIAL - Abnormal; Notable for the following components:    RBC 4.55 (*)     Hematocrit 41.1 (*)     All other components within normal limits   C-REACTIVE PROTEIN - Abnormal; Notable for the following components:    CRP 8.25 (*)     All other components within normal limits   LACTATE DEHYDROGENASE - Abnormal; Notable for the following components:     (*)     All other components within normal limits   BASIC METABOLIC PANEL - Abnormal; Notable for the following components:    Sodium 134 (*)     Chloride 97 specified.     DISCHARGE MEDICATIONS:  New Prescriptions    No medications on file       (Please note that portions of this note were completed with a voice recognition program.  Efforts were made to edit the dictations but occasionally words are mis-transcribed.)    MD Vince Ovalles MD  11/17/20 Errol Mayo MD  11/17/20 2313

## 2020-11-17 NOTE — ED TRIAGE NOTES
Pt tested on 11/12/20 for covid, +test results called to pt on Monday. Pt states he cannot keep fever down, cannot eat or drink and is still sob.   Symptoms began 11/5/20

## 2020-11-17 NOTE — ED NOTES
Bed: 040A  Expected date:   Expected time:   Means of arrival:   Comments:  HANNY Lozoya RN  11/17/20 5199

## 2020-11-17 NOTE — ED NOTES
ED nurse-to-nurse bedside report    Chief Complaint   Patient presents with    Shortness of Breath     covid +      LOC: alert and orientated to name, place, date  Vital signs   Vitals:    11/17/20 1214 11/17/20 1303 11/17/20 1526   BP: 117/69 121/77 137/78   Pulse: 74 66 67   Resp: (!) 2 17 20   Temp: 98.1 °F (36.7 °C) 98.4 °F (36.9 °C)    TempSrc: Oral Oral    SpO2: 93% 95% 94%   Weight: 185 lb (83.9 kg)     Height: 5' 10\" (1.778 m)        Pain:    Pain Interventions: N/A  Pain Goal: N/A  Oxygen: No    Current needs required RA   Telemetry: Yes  LDAs:   Peripheral IV 11/17/20 Right Forearm (Active)     Continuous Infusions:    sodium chloride 100 mL/hr at 11/17/20 1314     Mobility: Requires assistance * 1  Garcia Fall Risk Score:    Fall Risk 9/2/2020 9/5/2019 9/13/2018   2 or more falls in past year? no no no   Fall with injury in past year? no no no     Fall Interventions: Side rails and call light  Report given to: Mojgan Sorensen RN  11/17/20 0071

## 2020-11-17 NOTE — ED TRIAGE NOTES
Patient states that for the last 10 days he has felt weak and fatigued. He was tested for Covid 19 on 11/12/2020, and found out yesterday he was positive for Covid. Since then he feels his symptoms such as shortness of breath, nausea and body aches seem to worsen. Denies any loss of smell of taste.

## 2020-11-17 NOTE — H&P
Hospitalist - History & Physical      Patient: Rashad Cerna    Unit/Bed:11/011A  YOB: 1950  MRN: [id number]   Acct: [de-identified]   PCP: Tete Fitch MD    Date of Service: Pt seen/examined on 11/17/20  and Admitted to Inpatient with expected LOS greater than two midnights due to medical therapy. Chief Complaint:  Worsening shortness of breath with progressive weakness     Assessment and Plan:-    COVID 19: Patient's rapid Covid test was positive, CRP 8.25, , procalcitonin 0.15. CXR in the ED reports: Bibasilar patchy opacities correlate for pneumonia. Patient was given 6 mg IV Decadron while in the ED. We will continue patient on 6 mg IV Decadron for total of 10 days. Tylenol every 6 hours for fever. Will test for superimposed pneumonias. Influenza A: Patient tested positive while in the ED. Patient was given one-time dose of 75 mg Tamiflu. Continue Tamiflu 75 mg twice a day for 5 days. Tylenol every 6 hours for fever    BPH: continue tamsulosin 0.4 mg daily. Nursing is to continue to monitor for urinary retention     History of Hepatitis B: ALT 72, AST 61. We will continue to monitor    Hyperlipidemia: continue Lipitor 10 mg daily     Psoriasis: patient experiences significant itching of body and scalp. Please continue home ketoconazole 2 % shampoo daily and triamcinolone 0.025% cream four times daily as needed     CODE STATUS: Full code    History Of Present Illness:    HPI as well as past medical history was obtained by patient as well as patient's daughter who was in the room with patient. 71-year-old male presented to [facility] emergency room department for progressively worsening weakness and fatigue as well as unable to keep fever down. Patient states on November 3 he worked in the [employer] during election and by November 5 he felt very fatigued and said he had flulike symptoms including but not limited to joint pain, muscle pain, body aches. Patient symptoms got progressively worse in which he also expressed he had no appetite and was very nauseous without vomiting. Patient denied any chest pain, palpitations, and loss of taste or smell, however admits to shortness of breath/fatigue with exertion. Patient states he was tested by his PCP on 11/12/2020 and found out yesterday to be Covid positive. Patient has significant past medical history of BPH, hepatitis B, hyperlipidemia, and psoriasis. Patient denies any current lung conditions and denies any supplemental oxygen at home. While in the emergency room department patient tested positive for influenza A as well as COVID-19. On arrival patient was afebrile. Chest x-ray reported: Bibasilar patchy opacities correlate for pneumonia. Past Medical History:        Diagnosis Date    BPH (benign prostatic hyperplasia)     Hepatitis B     Hyperlipidemia     Psoriasis        Past Surgical History:        Procedure Laterality Date    BACK SURGERY  2015    Herniated disk     MOUTH SURGERY  1968       Home Medications:   No current facility-administered medications on file prior to encounter. Current Outpatient Medications on File Prior to Encounter   Medication Sig Dispense Refill    azithromycin (ZITHROMAX Z-CHAYA) 250 MG tablet Take two (2) tablets by mouth on day 1, then take one (1) tablet by mouth daily for four (4) days. 6 tablet 0    tamsulosin (FLOMAX) 0.4 MG capsule Take 1 capsule by mouth daily 90 capsule 3    atorvastatin (LIPITOR) 10 MG tablet Take 1 tablet by mouth daily 90 tablet 1    Multiple Vitamins-Minerals (MULTIVITAMIN-MINERALS) TABS tablet Take 1 tablet by mouth daily      aspirin EC 81 MG EC tablet Take 81 mg by mouth daily      ketoconazole (NIZORAL) 2 % shampoo Apply topically daily as needed for Itching Apply topically daily as needed.       triamcinolone (KENALOG) 0.025 % cream Apply topically every 4 hours as needed Apply Topically         Allergies: Crestor [rosuvastatin calcium] and Benadryl [diphenhydramine]    Social History:    reports that he has never smoked. He has never used smokeless tobacco. He reports that he does not drink alcohol or use drugs. Family History:       Problem Relation Age of Onset    High Cholesterol Mother     Emphysema Father     Asthma Father     Cancer Sister     Cancer Brother        Diet:  No diet orders on file    Review of systems:   Pertinent positives as noted in the HPI. All other systems reviewed and negative. PHYSICAL EXAM:  /77   Pulse 66   Temp 98.4 °F (36.9 °C) (Oral)   Resp 17   Ht 5' 10\" (1.778 m)   Wt 185 lb (83.9 kg)   SpO2 95%   BMI 26.54 kg/m²   General appearance: mild apparent distress, appears stated age and cooperative. HEENT: Normal cephalic, atraumatic without obvious deformity. Pupils equal, round, and reactive to light. Extra ocular muscles intact. Conjunctivae/corneas clear. Neck: Supple, with full range of motion. No jugular venous distention. Trachea midline. Respiratory:  Normal respiratory effort. Decreased breath sounds bilaterally, with noted crackles right worse than left  Cardiovascular: Regular rate and rhythm with normal S1/S2 without murmurs, rubs or gallops. Abdomen: Soft, non-tender, non-distended with normal bowel sounds. Musculoskeletal:  No clubbing, cyanosis or edema bilaterally. Skin: Skin color, texture, turgor normal.  No rashes or lesions. Neurologic:  Neurovascularly intact without any focal sensory/motor deficits.  Cranial nerves: II-XII intact, grossly non-focal.  Psychiatric: Alert and oriented, thought content appropriate, normal insight  Capillary Refill: Brisk,< 3 seconds   Peripheral Pulses: +2 palpable, equal bilaterally     Labs:   Recent Labs     11/17/20  1239   WBC 7.7   HGB 14.0   HCT 41.1*        Recent Labs     11/17/20  1239   *   K 3.9   CL 97*   CO2 24   BUN 30*   CREATININE 1.0   CALCIUM 8.9     Recent Labs

## 2020-11-17 NOTE — FLOWSHEET NOTE
11/17/20 1830   Patient Goal of the Day(Whiteboard)   Patient Daily Goal reviewed with Patient   Patient's reason for admission COVID and Flu A   Precautions   Precautions Droplet; Fall risk   Negative Pressure Room No   Positive Pressure Room No   Safe Environment   Arm Bands On ID; Allergies; Fall   Patient has limb restriction? No   Call Light Within Reach Yes   Overbed Table Within Reach Yes   Bed In Lowest Position Yes   Bed Wheels Locked Yes   Siderails Up 2/4   NonSkid Footwear On;Patient in bed   Fall Risk Interventions   Toilet Every 2 Hours-In Advance of Need Yes   Hourly Visual Checks Awake; In bed   Fall Visual Posted Armband; Fall sign posted   Room Door Open Yes   Alarm On Bed   Mobility   Activity In bed   Repositioned Turns self;Semi fowlers;Pillow support   Head of Bed Elevated  Self regulated   Heels/Feet Heel(s) elevated off bed; Foot of bed elevated   Range of Motion Active; All extremities   Patient Arrival To Unit 1818   Transport Method Bed   Anti-Embolism Intervention Medication  (Lovenox ordered)   Pain Assessment   Pain Assessment 0-10   Pain Level 0   Patient's Stated Pain Goal No pain   Comfort and Environment Interventions   Additional Comfort/Environmental Interventions Special mattress   Special Mattress Low air loss       Pt admitted to  6A 19 from ED and in a bed. Complaints: Weakness in legs / COVID / Flu A.    IV normal saline infusing into the forearm right - 20g, condition patent and no redness at a rate of 100 mls/ hour with about 700 mls in the bag still. IV site free of s/s of infection or infiltration. Vital signs obtained. Assessment and data collection initiated. Two nurse skin assessment performed by [facility] RN and Denver Quintana. Oriented to room. Policies and procedures for 6A explained. All questions answered with no further questions at this time. Fall prevention and safety brochure discussed with patient. Bed alarm on. Call light in reach.     Would you like your Primary Care Physician notified? Yes     The best day to schedule a follow up Dr appointment is:  Monday a.m.

## 2020-11-18 LAB
ALBUMIN SERPL-MCNC: 3.5 G/DL (ref 3.5–5.1)
ALP BLD-CCNC: 157 U/L (ref 38–126)
ALT SERPL-CCNC: 223 U/L (ref 11–66)
ANION GAP SERPL CALCULATED.3IONS-SCNC: 12 MEQ/L (ref 8–16)
AST SERPL-CCNC: 204 U/L (ref 5–40)
BASOPHILS # BLD: 0 %
BASOPHILS ABSOLUTE: 0 THOU/MM3 (ref 0–0.1)
BILIRUB SERPL-MCNC: 0.4 MG/DL (ref 0.3–1.2)
BUN BLDV-MCNC: 26 MG/DL (ref 7–22)
CALCIUM SERPL-MCNC: 8.8 MG/DL (ref 8.5–10.5)
CHLORIDE BLD-SCNC: 103 MEQ/L (ref 98–111)
CO2: 22 MEQ/L (ref 23–33)
CREAT SERPL-MCNC: 0.9 MG/DL (ref 0.4–1.2)
D-DIMER QUANTITATIVE: 249 NG/ML FEU (ref 0–500)
EOSINOPHIL # BLD: 0 %
EOSINOPHILS ABSOLUTE: 0 THOU/MM3 (ref 0–0.4)
ERYTHROCYTE [DISTWIDTH] IN BLOOD BY AUTOMATED COUNT: 12.5 % (ref 11.5–14.5)
ERYTHROCYTE [DISTWIDTH] IN BLOOD BY AUTOMATED COUNT: 42.4 FL (ref 35–45)
GFR SERPL CREATININE-BSD FRML MDRD: 83 ML/MIN/1.73M2
GLUCOSE BLD-MCNC: 163 MG/DL (ref 70–108)
HCT VFR BLD CALC: 40.2 % (ref 42–52)
HEMOGLOBIN: 13.2 GM/DL (ref 14–18)
IMMATURE GRANS (ABS): 0.01 THOU/MM3 (ref 0–0.07)
IMMATURE GRANULOCYTES: 0.3 %
INR BLD: 1.14 (ref 0.85–1.13)
LYMPHOCYTES # BLD: 19.1 %
LYMPHOCYTES ABSOLUTE: 0.7 THOU/MM3 (ref 1–4.8)
MCH RBC QN AUTO: 30.3 PG (ref 26–33)
MCHC RBC AUTO-ENTMCNC: 32.8 GM/DL (ref 32.2–35.5)
MCV RBC AUTO: 92.2 FL (ref 80–94)
MONOCYTES # BLD: 5.4 %
MONOCYTES ABSOLUTE: 0.2 THOU/MM3 (ref 0.4–1.3)
NUCLEATED RED BLOOD CELLS: 0 /100 WBC
ORGANISM: ABNORMAL
OSMOLALITY CALCULATION: 282.2 MOSMOL/KG (ref 275–300)
PLATELET # BLD: 168 THOU/MM3 (ref 130–400)
PMV BLD AUTO: 11.7 FL (ref 9.4–12.4)
POTASSIUM REFLEX MAGNESIUM: 4.5 MEQ/L (ref 3.5–5.2)
RBC # BLD: 4.36 MILL/MM3 (ref 4.7–6.1)
SEG NEUTROPHILS: 75.2 %
SEGMENTED NEUTROPHILS ABSOLUTE COUNT: 2.8 THOU/MM3 (ref 1.8–7.7)
SODIUM BLD-SCNC: 137 MEQ/L (ref 135–145)
TOTAL PROTEIN: 5.7 G/DL (ref 6.1–8)
URINE CULTURE, ROUTINE: ABNORMAL
WBC # BLD: 3.7 THOU/MM3 (ref 4.8–10.8)

## 2020-11-18 PROCEDURE — 1200000003 HC TELEMETRY R&B

## 2020-11-18 PROCEDURE — 36415 COLL VENOUS BLD VENIPUNCTURE: CPT

## 2020-11-18 PROCEDURE — 85379 FIBRIN DEGRADATION QUANT: CPT

## 2020-11-18 PROCEDURE — XW13325 TRANSFUSION OF CONVALESCENT PLASMA (NONAUTOLOGOUS) INTO PERIPHERAL VEIN, PERCUTANEOUS APPROACH, NEW TECHNOLOGY GROUP 5: ICD-10-PCS | Performed by: INTERNAL MEDICINE

## 2020-11-18 PROCEDURE — 85610 PROTHROMBIN TIME: CPT

## 2020-11-18 PROCEDURE — 2580000003 HC RX 258: Performed by: STUDENT IN AN ORGANIZED HEALTH CARE EDUCATION/TRAINING PROGRAM

## 2020-11-18 PROCEDURE — 6370000000 HC RX 637 (ALT 250 FOR IP): Performed by: NURSE PRACTITIONER

## 2020-11-18 PROCEDURE — 6370000000 HC RX 637 (ALT 250 FOR IP): Performed by: STUDENT IN AN ORGANIZED HEALTH CARE EDUCATION/TRAINING PROGRAM

## 2020-11-18 PROCEDURE — 2580000003 HC RX 258: Performed by: INTERNAL MEDICINE

## 2020-11-18 PROCEDURE — 85025 COMPLETE CBC W/AUTO DIFF WBC: CPT

## 2020-11-18 PROCEDURE — 99232 SBSQ HOSP IP/OBS MODERATE 35: CPT | Performed by: NURSE PRACTITIONER

## 2020-11-18 PROCEDURE — 6360000002 HC RX W HCPCS: Performed by: STUDENT IN AN ORGANIZED HEALTH CARE EDUCATION/TRAINING PROGRAM

## 2020-11-18 PROCEDURE — 94669 MECHANICAL CHEST WALL OSCILL: CPT

## 2020-11-18 PROCEDURE — 80053 COMPREHEN METABOLIC PANEL: CPT

## 2020-11-18 PROCEDURE — 6370000000 HC RX 637 (ALT 250 FOR IP): Performed by: INTERNAL MEDICINE

## 2020-11-18 RX ORDER — FAMOTIDINE 20 MG/1
20 TABLET, FILM COATED ORAL 2 TIMES DAILY
Status: DISCONTINUED | OUTPATIENT
Start: 2020-11-18 | End: 2020-11-23 | Stop reason: HOSPADM

## 2020-11-18 RX ADMIN — Medication 2000 UNITS: at 08:58

## 2020-11-18 RX ADMIN — ENOXAPARIN SODIUM 30 MG: 30 INJECTION SUBCUTANEOUS at 20:08

## 2020-11-18 RX ADMIN — OSELTAMIVIR PHOSPHATE 75 MG: 75 CAPSULE ORAL at 20:03

## 2020-11-18 RX ADMIN — ATORVASTATIN CALCIUM 10 MG: 10 TABLET, FILM COATED ORAL at 20:03

## 2020-11-18 RX ADMIN — TAMSULOSIN HYDROCHLORIDE 0.4 MG: 0.4 CAPSULE ORAL at 08:53

## 2020-11-18 RX ADMIN — DEXAMETHASONE 6 MG: 4 TABLET ORAL at 08:53

## 2020-11-18 RX ADMIN — ENOXAPARIN SODIUM 30 MG: 30 INJECTION SUBCUTANEOUS at 08:53

## 2020-11-18 RX ADMIN — OSELTAMIVIR PHOSPHATE 75 MG: 75 CAPSULE ORAL at 08:53

## 2020-11-18 RX ADMIN — FAMOTIDINE 20 MG: 20 TABLET, FILM COATED ORAL at 21:15

## 2020-11-18 RX ADMIN — ASPIRIN 81 MG: 81 TABLET, COATED ORAL at 08:53

## 2020-11-18 RX ADMIN — SODIUM CHLORIDE, PRESERVATIVE FREE 10 ML: 5 INJECTION INTRAVENOUS at 20:03

## 2020-11-18 RX ADMIN — OXYCODONE HYDROCHLORIDE AND ACETAMINOPHEN 1000 MG: 500 TABLET ORAL at 08:53

## 2020-11-18 RX ADMIN — Medication 50 MG: at 08:53

## 2020-11-18 RX ADMIN — SODIUM CHLORIDE 20 ML: 9 INJECTION, SOLUTION INTRAVENOUS at 00:18

## 2020-11-18 ASSESSMENT — PAIN SCALES - GENERAL: PAINLEVEL_OUTOF10: 0

## 2020-11-18 NOTE — PROGRESS NOTES
Comprehensive Nutrition Assessment    Type and Reason for Visit:  Initial, Positive Nutrition Screen(unplanned wt loss, poor po/appetite, malnutrition)    Nutrition Recommendations/Plan:   Continue current diet. Send ensure compact TID. MD to advise of pt can have regularly scheduled bowel meds. Last BM was (11/13) per pt. Continue Vitamin C, Vitamin D, Zincate. Nutrition Assessment:     Pt. nutritionally compromised AEB pt statement unplanned wt loss. At risk for further nutrition compromise r/t admitted with Covid, influenza A, pt statement no BM x 5 days, Hx Hyperlipidemia. Nutrition recommendations/interventions as per above. Malnutrition Assessment:  Malnutrition Status: At risk    Estimated Daily Nutrient Needs:  Energy (kcal):  ~2349kcals (~30kcals/kgm) late active phase; Weight Used for Energy Requirements:  (78.3kgm (11/18))     Protein (g):   grams (1.2-2 grams protein/kgm); Weight Used for Protein Requirements:  (78.3kgm (11/18))        Nutrition Related Findings:  Spoke with pt & he reports fair appetite. He denies chewing/swallowing difficulty. Wife has alzheimer's & daughter lives with pt. Pt & daughter do the cooking. He tends to consume 3 meals/day. Pt reports last Bm was (11/13)-none charted. No Bowel meds given this admit per cht.   Labs: (11/18) Alk Phos 157, , . Meds: Vitamin C, Vitamin D, Zincate, Glycolax.       Wounds:  None       Current Nutrition Therapies:    DIET GENERAL;  Dietary Nutrition Supplements: Low Volume Supplement    Anthropometric Measures:  · Height: 5' 10\" (177.8 cm)  · Current Body Weight: 172 lb 11.2 oz (78.3 kg)(no edema)   · Admission Body Weight: 173 lb 3.2 oz (78.6 kg)((11/17) no edema)    · Usual Body Weight: (per pt 185# unsure when, per EMR: (10/9) 183#, (9/2): 181# 3.2oz, (3/5): 177# 3.2oz)     · Ideal Body Weight: 166 lbs;    · BMI: 24.8  ·   · BMI Categories: Normal Weight (BMI 22.0 to 24.9) age over 72       Nutrition Diagnosis:   · (Unplanned wt loss) related to catabolic illness as evidenced by weight loss      Nutrition Interventions:   Food and/or Nutrient Delivery:  Continue Current Diet, Start Oral Nutrition Supplement, Vitamin Supplement, Mineral Supplement  Nutrition Education/Counseling:  Education initiated(Encouraged lean protein chocie with each meal. Encouraged hot beverage & high fiber foods to aid with BM.)   Coordination of Nutrition Care:  Continue to monitor while inpatient    Goals:  pt will consume 75% or more at meals to aid with stabilizing wt. Nutrition Monitoring and Evaluation:   Behavioral-Environmental Outcomes:  None Identified   Food/Nutrient Intake Outcomes:  Diet Advancement/Tolerance, Food and Nutrient Intake, Supplement Intake, Vitamin/Mineral Intake  Physical Signs/Symptoms Outcomes:  Biochemical Data, Constipation, GI Status, Fluid Status or Edema, Hemodynamic Status, Skin, Weight     Discharge Planning:     Too soon to determine     Electronically signed by Silvia Serrano RD, LD on 11/18/20 at 11:25 AM EST    Contact: [phone]

## 2020-11-18 NOTE — PROGRESS NOTES
Hospitalist Progress Note      Patient:  Gilda Clark    Unit/Bed:6A-19/019-A  YOB: 1950  MRN: [id number]   Acct: [de-identified]   PCP: Dannielle Cottrell MD  Date of Admission: 11/17/2020    Assessment/Plan:    1. Viral Pneumonia: Secondary to COVID-19 infection. Initial labwork revealed: CRP 8.25, , procalcitonin 0.15. CXR in the ED reported bibasilar patchy opacities correlate for pneumonia. Patient was started on Decadron 6 mg IVP while in the ED, continue. Tylenol available every 6 hours for fever. Start Pepcid. Continue Vitamin C, D, and Zinc. Results pending for superimposed pneumonias. 2. Influenza A: Patient tested positive while in the ED. Continue Tamiflu (Day #2 of 5) and supportive care. 3. BPH: Continue tamsulosin 0.4 mg daily. Nursing is to continue to monitor for urinary retention. 4. History of Hepatitis B:  Stable. ALT 72, AST 61. Continue to monitor  5. Hyperlipidemia: History. Continue Lipitor 10 mg daily. 6. Psoriasis: History. Patient experiences significant itching of body and scalp. Continue home ketoconazole 2 % shampoo daily and triamcinolone 0.025% cream four times daily as needed. Chief Complaint: Worsening shortness of breath with progressive weakness     Initial H and P:-    **Per Chart Review:  \"HPI as well as past medical history was obtained by patient as well as patient's daughter who was in the room with patient.     23-year-old male presented to [facility] emergency room department for progressively worsening weakness and fatigue as well as unable to keep fever down. Patient states on November 3 he worked in the [email or web address] during election and by November 5 he felt very fatigued and said he had flulike symptoms including but not limited to joint pain, muscle pain, body aches.   Patient symptoms got progressively worse in which he also expressed he had no appetite and was very nauseous Abdomen is soft and non-tender, non-distended. PERIPHERAL VASCULAR: No intermittent claudication or unusual leg cramps. MUSCULOSKELETAL: Generalized arthralgias, myalgias with fatigue/malaise. NEUROLOGICAL: Denies any headache, near syncope, seizures or syncope. HEMATOLOGIC:  No unusual bruising or bleeding. PSYCH: Denies any homicidal or suicidial ideations. Medications:  Reviewed    Infusion Medications    sodium chloride Stopped (11/18/20 0908)     Scheduled Medications    aspirin EC  81 mg Oral Daily    atorvastatin  10 mg Oral Daily    tamsulosin  0.4 mg Oral Daily    enoxaparin  30 mg Subcutaneous BID    sodium chloride flush  10 mL Intravenous 2 times per day    Vitamin D  2,000 Units Oral Daily    oseltamivir  75 mg Oral BID    dexamethasone  6 mg Oral Daily    zinc sulfate  50 mg Oral Daily    vitamin C  1,000 mg Oral Daily     PRN Meds: fluocinonide, ketoconazole, acetaminophen **OR** acetaminophen, sodium chloride flush, polyethylene glycol, promethazine **OR** ondansetron, potassium chloride, magnesium sulfate, guaiFENesin-dextromethorphan      Intake/Output Summary (Last 24 hours) at 11/18/2020 1057  Last data filed at 11/18/2020 0205  Gross per 24 hour   Intake 1973.17 ml   Output 750 ml   Net 1223.17 ml       Diet:  DIET GENERAL;    Exam:  BP (!) 148/101   Pulse 62   Temp 98 °F (36.7 °C) (Oral)   Resp 25   Ht 5' 10\" (1.778 m)   Wt 172 lb 11.2 oz (78.3 kg)   SpO2 92%   BMI 24.78 kg/m²     General appearance: Alert and appropriate, pleasant geriatric male. No apparent distress, appears stated age and cooperative. HEENT: Pupils equal, round, and reactive to light. Conjunctivae/corneas clear. Neck: Supple, with full range of motion. No jugular venous distention. Trachea midline. Respiratory:  Normal respiratory effort. Clear to auscultation, bilaterally without Rales/Wheezes/Rhonchi.   Cardiovascular: Regular rate and rhythm with normal S1/S2 without murmurs, rubs or gallops. Abdomen: Soft, non-tender, non-distended with normal bowel sounds. Musculoskeletal: Passive and active ROM x 4 extremities. Skin: Skin color, texture, turgor normal.  No rashes or lesions. Neurologic:  Neurovascularly intact without any focal sensory/motor deficits. Cranial nerves: II-XII intact, grossly non-focal.  Psychiatric: Alert and oriented to person, place, time, and situation. Thought content appropriate, normal insight  Capillary Refill: Brisk,< 3 seconds   Peripheral Pulses: +2 palpable, equal bilaterally     Labs:   Recent Labs     11/17/20  1239 11/17/20  1555 11/18/20  0601   WBC 7.7 7.5 3.7*   HGB 14.0 14.6 13.2*   HCT 41.1* 43.9 40.2*    149 168     Recent Labs     11/17/20  1239 11/17/20  1555 11/18/20  0601   * 136 137   K 3.9 4.3 4.5   CL 97* 97* 103   CO2 24 27 22*   BUN 30* 27* 26*   CREATININE 1.0 1.1 0.9   CALCIUM 8.9 8.9 8.8     Recent Labs     11/17/20  1239 11/17/20  1555 11/18/20  0601   AST 61* 101* 204*   ALT 72* 98* 223*   BILIDIR <0.2  --   --    BILITOT 0.6 0.7 0.4   ALKPHOS 93 113 157*     Recent Labs     11/17/20  1239 11/17/20  1555 11/18/20  0601   INR 1.26* 1.26* 1.14*     No results for input(s): Thierno Faria in the last 72 hours.     Microbiology:    Blood culture #1:   Lab Results   Component Value Date    BC No growth-preliminary  11/17/2020    BC No growth-preliminary  11/17/2020       Blood culture #2:No results found for: Tiffanie Saucedo    Organism:No results found for: ORG    No results found for: LABGRAM    MRSA culture only:No results found for: [address] [place] [address]    Urine culture: No results found for: LABURIN    Respiratory culture: No results found for: CULTRESP    Aerobic and Anaerobic :  No results found for: LABAERO  No results found for: LABANAE    Urinalysis:      Lab Results   Component Value Date    NITRU NEGATIVE 11/17/2020    WBCUA 2-4 11/17/2020    BACTERIA NONE SEEN 11/17/2020    RBCUA 0-2 11/17/2020    BLOODU NEGATIVE 11/17/2020    SPECGRAV 1.020 11/17/2020    GLUCOSEU Negative 10/09/2020       Radiology:  XR CHEST PORTABLE   Final Result   Bibasilar patchy opacities correlate for pneumonia. Viral or atypical infection should be considered. **This report has been created using voice recognition software. It may contain minor errors which are inherent in voice recognition technology. **      Final report electronically signed by Dr. Jhonatan Vitale on 11/17/2020 12:54 PM        Xr Chest Portable    Result Date: 11/17/2020  PROCEDURE: XR CHEST PORTABLE CLINICAL INFORMATION: covid. COMPARISON: No prior study. TECHNIQUE: Portable chest. FINDINGS: Bibasilar patchy opacities correlate for pneumonia. Viral or atypical infection should be considered. Costophrenic angles are preserved. Cardiomediastinal silhouette is within normal limits. No acute osseous findings. Bibasilar patchy opacities correlate for pneumonia. Viral or atypical infection should be considered. **This report has been created using voice recognition software. It may contain minor errors which are inherent in voice recognition technology. ** Final report electronically signed by Dr. Jhonatan Vitale on 11/17/2020 12:54 PM      **This report has been created using voice recognition software. It may contain minor errors which are inherent in voice recognition technology. **  Electronically signed by PRAMOD Tate CNP on 11/18/2020 at 10:57 AM

## 2020-11-18 NOTE — CARE COORDINATION
DISASTER CHARTING    11/18/20, 10:15 AM EST    DISCHARGE ONGOING EVALUATION:     [address] [facility] day: 1  Location: 6A-19/019-A Reason for admit: COVID-19 [U07.1]   Barriers to Discharge: Patient presents with febrile illness and shortness of breath. COVID + and influenza A +. Patient is afebrile since admission and on room air. Convalescent plasma x 1. IV fluids, Decadron, Tamiflu, Lovenox, Vitamin C,D, and Zinc, Droplet + isolation, incentive spirometry, acapella, up with assistance. PCP: Yamila Sánchez MD  Patient Goals/Plan/Treatment Preferences: Met with Ever Zhanna. Ever Chao is from home with his wife, daughter, son-in-law, and five grandchildren. He states his daughter will pick him up at discharge. Ever Chao verifies his insurance and PCP. He can afford his medications and denies a need for DME. Mack declines [facility] and states he has plenty of help at home.

## 2020-11-18 NOTE — PROGRESS NOTES
This RN called daughter Laura Cortez and updated daughter on patient's room number, phone number to the room, and patient's HIPAA code.

## 2020-11-19 LAB
ALBUMIN SERPL-MCNC: 3.2 G/DL (ref 3.5–5.1)
ALP BLD-CCNC: 156 U/L (ref 38–126)
ALT SERPL-CCNC: 266 U/L (ref 11–66)
ANION GAP SERPL CALCULATED.3IONS-SCNC: 9 MEQ/L (ref 8–16)
AST SERPL-CCNC: 151 U/L (ref 5–40)
BASOPHILS # BLD: 0 %
BASOPHILS ABSOLUTE: 0 THOU/MM3 (ref 0–0.1)
BILIRUB SERPL-MCNC: 0.4 MG/DL (ref 0.3–1.2)
BUN BLDV-MCNC: 28 MG/DL (ref 7–22)
CALCIUM SERPL-MCNC: 9.2 MG/DL (ref 8.5–10.5)
CHLORIDE BLD-SCNC: 104 MEQ/L (ref 98–111)
CO2: 24 MEQ/L (ref 23–33)
CREAT SERPL-MCNC: 0.9 MG/DL (ref 0.4–1.2)
D-DIMER QUANTITATIVE: 218 NG/ML FEU (ref 0–500)
EOSINOPHIL # BLD: 0 %
EOSINOPHILS ABSOLUTE: 0 THOU/MM3 (ref 0–0.4)
ERYTHROCYTE [DISTWIDTH] IN BLOOD BY AUTOMATED COUNT: 12.7 % (ref 11.5–14.5)
ERYTHROCYTE [DISTWIDTH] IN BLOOD BY AUTOMATED COUNT: 42.1 FL (ref 35–45)
GFR SERPL CREATININE-BSD FRML MDRD: 83 ML/MIN/1.73M2
GLUCOSE BLD-MCNC: 167 MG/DL (ref 70–108)
HCT VFR BLD CALC: 39.5 % (ref 42–52)
HEMOGLOBIN: 12.9 GM/DL (ref 14–18)
IMMATURE GRANS (ABS): 0.06 THOU/MM3 (ref 0–0.07)
IMMATURE GRANULOCYTES: 0.8 %
INR BLD: 1.08 (ref 0.85–1.13)
LEGIONELLA PNEUMOPHILIA AG, URINE: NEGATIVE
LYMPHOCYTES # BLD: 11.9 %
LYMPHOCYTES ABSOLUTE: 0.9 THOU/MM3 (ref 1–4.8)
MCH RBC QN AUTO: 30 PG (ref 26–33)
MCHC RBC AUTO-ENTMCNC: 32.7 GM/DL (ref 32.2–35.5)
MCV RBC AUTO: 91.9 FL (ref 80–94)
MONOCYTES # BLD: 6.4 %
MONOCYTES ABSOLUTE: 0.5 THOU/MM3 (ref 0.4–1.3)
NUCLEATED RED BLOOD CELLS: 0 /100 WBC
PLATELET # BLD: 183 THOU/MM3 (ref 130–400)
PMV BLD AUTO: 11.6 FL (ref 9.4–12.4)
POTASSIUM REFLEX MAGNESIUM: 4.2 MEQ/L (ref 3.5–5.2)
RBC # BLD: 4.3 MILL/MM3 (ref 4.7–6.1)
SEG NEUTROPHILS: 80.9 %
SEGMENTED NEUTROPHILS ABSOLUTE COUNT: 6.1 THOU/MM3 (ref 1.8–7.7)
SODIUM BLD-SCNC: 137 MEQ/L (ref 135–145)
STREP PNEUMO AG, UR: NEGATIVE
TOTAL PROTEIN: 6 G/DL (ref 6.1–8)
WBC # BLD: 7.5 THOU/MM3 (ref 4.8–10.8)

## 2020-11-19 PROCEDURE — 6360000002 HC RX W HCPCS: Performed by: STUDENT IN AN ORGANIZED HEALTH CARE EDUCATION/TRAINING PROGRAM

## 2020-11-19 PROCEDURE — 6370000000 HC RX 637 (ALT 250 FOR IP): Performed by: INTERNAL MEDICINE

## 2020-11-19 PROCEDURE — 85025 COMPLETE CBC W/AUTO DIFF WBC: CPT

## 2020-11-19 PROCEDURE — 36415 COLL VENOUS BLD VENIPUNCTURE: CPT

## 2020-11-19 PROCEDURE — 1200000003 HC TELEMETRY R&B

## 2020-11-19 PROCEDURE — 85610 PROTHROMBIN TIME: CPT

## 2020-11-19 PROCEDURE — 85379 FIBRIN DEGRADATION QUANT: CPT

## 2020-11-19 PROCEDURE — 99232 SBSQ HOSP IP/OBS MODERATE 35: CPT | Performed by: NURSE PRACTITIONER

## 2020-11-19 PROCEDURE — 6370000000 HC RX 637 (ALT 250 FOR IP): Performed by: NURSE PRACTITIONER

## 2020-11-19 PROCEDURE — 6370000000 HC RX 637 (ALT 250 FOR IP): Performed by: STUDENT IN AN ORGANIZED HEALTH CARE EDUCATION/TRAINING PROGRAM

## 2020-11-19 PROCEDURE — 36430 TRANSFUSION BLD/BLD COMPNT: CPT

## 2020-11-19 PROCEDURE — 2580000003 HC RX 258: Performed by: STUDENT IN AN ORGANIZED HEALTH CARE EDUCATION/TRAINING PROGRAM

## 2020-11-19 PROCEDURE — 80053 COMPREHEN METABOLIC PANEL: CPT

## 2020-11-19 PROCEDURE — P9059 PLASMA, FRZ BETWEEN 8-24HOUR: HCPCS

## 2020-11-19 RX ORDER — 0.9 % SODIUM CHLORIDE 0.9 %
20 INTRAVENOUS SOLUTION INTRAVENOUS ONCE
Status: DISCONTINUED | OUTPATIENT
Start: 2020-11-19 | End: 2020-11-23 | Stop reason: HOSPADM

## 2020-11-19 RX ADMIN — TAMSULOSIN HYDROCHLORIDE 0.4 MG: 0.4 CAPSULE ORAL at 09:13

## 2020-11-19 RX ADMIN — ENOXAPARIN SODIUM 30 MG: 30 INJECTION SUBCUTANEOUS at 20:41

## 2020-11-19 RX ADMIN — ENOXAPARIN SODIUM 30 MG: 30 INJECTION SUBCUTANEOUS at 09:14

## 2020-11-19 RX ADMIN — OXYCODONE HYDROCHLORIDE AND ACETAMINOPHEN 1000 MG: 500 TABLET ORAL at 09:14

## 2020-11-19 RX ADMIN — FAMOTIDINE 20 MG: 20 TABLET, FILM COATED ORAL at 20:41

## 2020-11-19 RX ADMIN — SODIUM CHLORIDE, PRESERVATIVE FREE 10 ML: 5 INJECTION INTRAVENOUS at 09:17

## 2020-11-19 RX ADMIN — Medication 50 MG: at 09:14

## 2020-11-19 RX ADMIN — ASPIRIN 81 MG: 81 TABLET, COATED ORAL at 09:13

## 2020-11-19 RX ADMIN — FAMOTIDINE 20 MG: 20 TABLET, FILM COATED ORAL at 09:13

## 2020-11-19 RX ADMIN — SODIUM CHLORIDE, PRESERVATIVE FREE 10 ML: 5 INJECTION INTRAVENOUS at 20:41

## 2020-11-19 RX ADMIN — OSELTAMIVIR PHOSPHATE 75 MG: 75 CAPSULE ORAL at 20:41

## 2020-11-19 RX ADMIN — DEXAMETHASONE 6 MG: 4 TABLET ORAL at 09:13

## 2020-11-19 RX ADMIN — Medication 2000 UNITS: at 09:13

## 2020-11-19 RX ADMIN — OSELTAMIVIR PHOSPHATE 75 MG: 75 CAPSULE ORAL at 09:14

## 2020-11-19 ASSESSMENT — PAIN SCALES - GENERAL
PAINLEVEL_OUTOF10: 0

## 2020-11-19 NOTE — CARE COORDINATION
DISASTER CHARTING    11/19/20, 4:37 PM EST    DISCHARGE ONGOING EVALUATION:     [address] [facility] day: 2  Location: 6A-19/019-A Reason for admit: COVID-19 [U07.1]   Barriers to Discharge: IV fluids, Decadron, Lovenox, Tamiflu, Vitamin C,D, and Zinc, convalescent plasma, SCD's, acapella, incentive spirometry . PCP: Suzie Ang MD  Patient Goals/Plan/Treatment Preferences: [place] Rodriguez is from home with his wife and family.

## 2020-11-20 ENCOUNTER — APPOINTMENT (OUTPATIENT)
Dept: ULTRASOUND IMAGING | Age: 70
DRG: 177 | End: 2020-11-20
Payer: MEDICARE

## 2020-11-20 LAB
ALBUMIN SERPL-MCNC: 3.7 G/DL (ref 3.5–5.1)
ALP BLD-CCNC: 143 U/L (ref 38–126)
ALT SERPL-CCNC: 350 U/L (ref 11–66)
ANION GAP SERPL CALCULATED.3IONS-SCNC: 13 MEQ/L (ref 8–16)
AST SERPL-CCNC: 136 U/L (ref 5–40)
BASOPHILS # BLD: 0.1 %
BASOPHILS ABSOLUTE: 0 THOU/MM3 (ref 0–0.1)
BILIRUB SERPL-MCNC: 0.3 MG/DL (ref 0.3–1.2)
BUN BLDV-MCNC: 31 MG/DL (ref 7–22)
CALCIUM SERPL-MCNC: 9.6 MG/DL (ref 8.5–10.5)
CHLORIDE BLD-SCNC: 105 MEQ/L (ref 98–111)
CO2: 25 MEQ/L (ref 23–33)
CREAT SERPL-MCNC: 1 MG/DL (ref 0.4–1.2)
D-DIMER QUANTITATIVE: 230 NG/ML FEU (ref 0–500)
EOSINOPHIL # BLD: 0 %
EOSINOPHILS ABSOLUTE: 0 THOU/MM3 (ref 0–0.4)
ERYTHROCYTE [DISTWIDTH] IN BLOOD BY AUTOMATED COUNT: 12.4 % (ref 11.5–14.5)
ERYTHROCYTE [DISTWIDTH] IN BLOOD BY AUTOMATED COUNT: 41.8 FL (ref 35–45)
GFR SERPL CREATININE-BSD FRML MDRD: 74 ML/MIN/1.73M2
GLUCOSE BLD-MCNC: 197 MG/DL (ref 70–108)
HCT VFR BLD CALC: 40 % (ref 42–52)
HEMOGLOBIN: 13.2 GM/DL (ref 14–18)
IMMATURE GRANS (ABS): 0.24 THOU/MM3 (ref 0–0.07)
IMMATURE GRANULOCYTES: 2.4 %
INR BLD: 0.91 (ref 0.85–1.13)
LYMPHOCYTES # BLD: 8.3 %
LYMPHOCYTES ABSOLUTE: 0.8 THOU/MM3 (ref 1–4.8)
MCH RBC QN AUTO: 30.3 PG (ref 26–33)
MCHC RBC AUTO-ENTMCNC: 33 GM/DL (ref 32.2–35.5)
MCV RBC AUTO: 92 FL (ref 80–94)
MONOCYTES # BLD: 6.5 %
MONOCYTES ABSOLUTE: 0.7 THOU/MM3 (ref 0.4–1.3)
NUCLEATED RED BLOOD CELLS: 0 /100 WBC
PLATELET # BLD: 187 THOU/MM3 (ref 130–400)
PMV BLD AUTO: 12 FL (ref 9.4–12.4)
POTASSIUM REFLEX MAGNESIUM: 4.3 MEQ/L (ref 3.5–5.2)
RBC # BLD: 4.35 MILL/MM3 (ref 4.7–6.1)
SEG NEUTROPHILS: 82.7 %
SEGMENTED NEUTROPHILS ABSOLUTE COUNT: 8.4 THOU/MM3 (ref 1.8–7.7)
SODIUM BLD-SCNC: 143 MEQ/L (ref 135–145)
TOTAL PROTEIN: 5.7 G/DL (ref 6.1–8)
WBC # BLD: 10.1 THOU/MM3 (ref 4.8–10.8)

## 2020-11-20 PROCEDURE — 6370000000 HC RX 637 (ALT 250 FOR IP): Performed by: INTERNAL MEDICINE

## 2020-11-20 PROCEDURE — 85025 COMPLETE CBC W/AUTO DIFF WBC: CPT

## 2020-11-20 PROCEDURE — 36415 COLL VENOUS BLD VENIPUNCTURE: CPT

## 2020-11-20 PROCEDURE — 2580000003 HC RX 258: Performed by: STUDENT IN AN ORGANIZED HEALTH CARE EDUCATION/TRAINING PROGRAM

## 2020-11-20 PROCEDURE — 6370000000 HC RX 637 (ALT 250 FOR IP): Performed by: STUDENT IN AN ORGANIZED HEALTH CARE EDUCATION/TRAINING PROGRAM

## 2020-11-20 PROCEDURE — 76705 ECHO EXAM OF ABDOMEN: CPT

## 2020-11-20 PROCEDURE — 2700000000 HC OXYGEN THERAPY PER DAY

## 2020-11-20 PROCEDURE — 6370000000 HC RX 637 (ALT 250 FOR IP): Performed by: NURSE PRACTITIONER

## 2020-11-20 PROCEDURE — 85379 FIBRIN DEGRADATION QUANT: CPT

## 2020-11-20 PROCEDURE — 85610 PROTHROMBIN TIME: CPT

## 2020-11-20 PROCEDURE — 80053 COMPREHEN METABOLIC PANEL: CPT

## 2020-11-20 PROCEDURE — 99232 SBSQ HOSP IP/OBS MODERATE 35: CPT | Performed by: NURSE PRACTITIONER

## 2020-11-20 PROCEDURE — 6360000002 HC RX W HCPCS: Performed by: STUDENT IN AN ORGANIZED HEALTH CARE EDUCATION/TRAINING PROGRAM

## 2020-11-20 PROCEDURE — 94669 MECHANICAL CHEST WALL OSCILL: CPT

## 2020-11-20 PROCEDURE — 2580000003 HC RX 258: Performed by: FAMILY MEDICINE

## 2020-11-20 PROCEDURE — 1200000003 HC TELEMETRY R&B

## 2020-11-20 PROCEDURE — 94761 N-INVAS EAR/PLS OXIMETRY MLT: CPT

## 2020-11-20 RX ADMIN — OSELTAMIVIR PHOSPHATE 75 MG: 75 CAPSULE ORAL at 08:24

## 2020-11-20 RX ADMIN — OSELTAMIVIR PHOSPHATE 75 MG: 75 CAPSULE ORAL at 22:45

## 2020-11-20 RX ADMIN — SODIUM CHLORIDE: 9 INJECTION, SOLUTION INTRAVENOUS at 16:44

## 2020-11-20 RX ADMIN — ENOXAPARIN SODIUM 30 MG: 30 INJECTION SUBCUTANEOUS at 08:25

## 2020-11-20 RX ADMIN — Medication 2000 UNITS: at 08:25

## 2020-11-20 RX ADMIN — SODIUM CHLORIDE, PRESERVATIVE FREE 10 ML: 5 INJECTION INTRAVENOUS at 22:45

## 2020-11-20 RX ADMIN — OXYCODONE HYDROCHLORIDE AND ACETAMINOPHEN 1000 MG: 500 TABLET ORAL at 08:24

## 2020-11-20 RX ADMIN — ENOXAPARIN SODIUM 30 MG: 30 INJECTION SUBCUTANEOUS at 22:44

## 2020-11-20 RX ADMIN — DEXAMETHASONE 6 MG: 4 TABLET ORAL at 08:23

## 2020-11-20 RX ADMIN — SODIUM CHLORIDE: 9 INJECTION, SOLUTION INTRAVENOUS at 05:13

## 2020-11-20 RX ADMIN — POLYETHYLENE GLYCOL 3350 17 G: 17 POWDER, FOR SOLUTION ORAL at 18:36

## 2020-11-20 RX ADMIN — FAMOTIDINE 20 MG: 20 TABLET, FILM COATED ORAL at 22:45

## 2020-11-20 RX ADMIN — TAMSULOSIN HYDROCHLORIDE 0.4 MG: 0.4 CAPSULE ORAL at 08:24

## 2020-11-20 RX ADMIN — Medication 50 MG: at 08:24

## 2020-11-20 RX ADMIN — FAMOTIDINE 20 MG: 20 TABLET, FILM COATED ORAL at 08:24

## 2020-11-20 RX ADMIN — ASPIRIN 81 MG: 81 TABLET, COATED ORAL at 08:23

## 2020-11-20 ASSESSMENT — PAIN SCALES - GENERAL
PAINLEVEL_OUTOF10: 0

## 2020-11-20 NOTE — PROGRESS NOTES
Hospitalist Progress Note      Patient:  Sidney Thrasher    Unit/Bed:6A-19/019-A  YOB: 1950  MRN: [id number]   Acct: [de-identified]   PCP: Savannah Blanca MD  Date of Admission: 11/17/2020    Assessment/Plan:    1. Viral Pneumonia: Secondary to COVID-19 infection. Initial labwork revealed: CRP 8.25, , procalcitonin 0.15. CXR in the ED reported bibasilar patchy opacities correlate for pneumonia. Hold remdesivir due to elevated LFTs. Continue Decadron and Pepcid, Vitamin C, D, and Zinc.   Start convalescent plasma. 2. Acute hypoxic respiratory failure: Secondary to above issue. Patient requiring 3L of oxygen via nasal cannula. Continue to wean as tolerated. 3. Influenza A: Patient tested positive while in the ED. Continue Tamiflu (Day #3 of 5) and supportive care. 4. Elevated LFTs: Most likely secondary to viral infection with hepatitis as well as statin contribution, hold Lipitor. 5. BPH: Continue tamsulosin 0.4 mg daily. Nursing is to continue to monitor for urinary retention. 6. History of Hepatitis B:  Stable. ALT 72, AST 61. Continue to monitor  7. Hyperlipidemia: History. Hold Lipitor as LFTs are trending up. 8. Psoriasis: History. Patient experiences significant itching of body and scalp. Continue home ketoconazole 2 % shampoo daily and triamcinolone 0.025% cream four times daily as needed. Chief Complaint: Worsening shortness of breath with progressive weakness     Initial H and P:-    **Per Chart Review:  \"HPI as well as past medical history was obtained by patient as well as patient's daughter who was in the room with patient.     79-year-old male presented to [facility] emergency room department for progressively worsening weakness and fatigue as well as unable to keep fever down.   Patient states on November 3 he worked in the [employer] during election and by November 5 he felt very fatigued and said he had flulike symptoms including but not limited to joint pain, muscle pain, body aches. Patient symptoms got progressively worse in which he also expressed he had no appetite and was very nauseous without vomiting. Patient denied any chest pain, palpitations, and loss of taste or smell, however admits to shortness of breath/fatigue with exertion. Patient states he was tested by his PCP on 11/12/2020 and found out yesterday to be Covid positive.   Patient has significant past medical history of BPH, hepatitis B, hyperlipidemia, and psoriasis. Patient denies any current lung conditions and denies any supplemental oxygen at home.   While in the emergency room department patient tested positive for influenza A as well as COVID-19. On arrival patient was afebrile. Chest x-ray reported: Bibasilar patchy opacities correlate for pneumonia. \"    Subjective (past 24 hours):   Patient resting in bed at the time of the interview. States that he is still feeling much better in comparison to yesterday. Patient states that his generalized weakness, fatigue, and malaise with an intermittent cough are improving. He denied any chest pain, shortness of breath, nausea, vomiting, abdominal pain, diarrhea, constipation, urinary complaints, lightheadedness, dizziness, headaches, changes in vision, fever, or chills. He was updated on the current plan of care, verbalizes understanding, and had no other needs or questions at this time. Past medical history, family history, social history and allergies reviewed again and is unchanged since admission. ROS (14 point review of systems completed. Pertinent positives noted. Otherwise ROS is negative) :  GENERAL: No fever,chills, or night sweats. SKIN: No lesions or rashes. HEAD: No headaches or recent injury. EYES: No acute changes in vision, no diplopia or blurred vision. EARS: No hearing loss, no tinnitus. NOSE/THROAT: No rhinorrhea or pharyngitis, no nasal drainage.   NECK: No lumps or unusual neck stiffness. PULMONARY: Respirations easy and non-labored, no acute distress. CARDIAC: No chest pain, pressure, Negative for lower leg edema. GI: Abdomen is soft and non-tender, non-distended. PERIPHERAL VASCULAR: No intermittent claudication or unusual leg cramps. MUSCULOSKELETAL: Occasional arthralgias, myalgias with fatigue/malaise. NEUROLOGICAL: Denies any headache, near syncope, seizures or syncope. HEMATOLOGIC:  No unusual bruising or bleeding. PSYCH: Denies any homicidal or suicidial ideations. Medications:  Reviewed    Infusion Medications    sodium chloride Stopped (11/18/20 0908)     Scheduled Medications    sodium chloride  20 mL Intravenous Once    famotidine  20 mg Oral BID    aspirin EC  81 mg Oral Daily    atorvastatin  10 mg Oral Daily    tamsulosin  0.4 mg Oral Daily    enoxaparin  30 mg Subcutaneous BID    sodium chloride flush  10 mL Intravenous 2 times per day    Vitamin D  2,000 Units Oral Daily    oseltamivir  75 mg Oral BID    dexamethasone  6 mg Oral Daily    zinc sulfate  50 mg Oral Daily    vitamin C  1,000 mg Oral Daily     PRN Meds: fluocinonide, ketoconazole, acetaminophen **OR** acetaminophen, sodium chloride flush, polyethylene glycol, promethazine **OR** ondansetron, potassium chloride, magnesium sulfate, guaiFENesin-dextromethorphan      Intake/Output Summary (Last 24 hours) at 11/19/2020 1916  Last data filed at 11/19/2020 1732  Gross per 24 hour   Intake 604.64 ml   Output 1125 ml   Net -520.36 ml       Diet:  DIET GENERAL;  Dietary Nutrition Supplements: Low Volume Supplement    Exam:  /82   Pulse 53   Temp 97.7 °F (36.5 °C) (Oral)   Resp 20   Ht 5' 10\" (1.778 m)   Wt 172 lb 11.2 oz (78.3 kg)   SpO2 94%   BMI 24.78 kg/m²     General appearance: Alert and appropriate, pleasant geriatric male. No apparent distress, appears stated age and cooperative. HEENT: Pupils equal, round, and reactive to light.  Conjunctivae/corneas clear.  Neck: Supple, with full range of motion. No jugular venous distention. Trachea midline. Respiratory:  Normal respiratory effort. Clear to auscultation, bilaterally without Rales/Wheezes/Rhonchi. Cardiovascular: Regular rate and rhythm with normal S1/S2 without murmurs, rubs or gallops. Abdomen: Soft, non-tender, non-distended with normal bowel sounds. Musculoskeletal: Passive and active ROM x 4 extremities. Skin: Skin color, texture, turgor normal.  No rashes or lesions. Neurologic:  Neurovascularly intact without any focal sensory/motor deficits. Cranial nerves: II-XII intact, grossly non-focal.  Psychiatric: Alert and oriented to person, place, time, and situation. Thought content appropriate, normal insight  Capillary Refill: Brisk,< 3 seconds   Peripheral Pulses: +2 palpable, equal bilaterally     Labs:   Recent Labs     11/17/20  1555 11/18/20  0601 11/19/20  0621   WBC 7.5 3.7* 7.5   HGB 14.6 13.2* 12.9*   HCT 43.9 40.2* 39.5*    168 183     Recent Labs     11/17/20  1555 11/18/20  0601 11/19/20  0621    137 137   K 4.3 4.5 4.2   CL 97* 103 104   CO2 27 22* 24   BUN 27* 26* 28*   CREATININE 1.1 0.9 0.9   CALCIUM 8.9 8.8 9.2     Recent Labs     11/17/20  1239 11/17/20  1555 11/18/20  0601 11/19/20  0621   AST 61* 101* 204* 151*   ALT 72* 98* 223* 266*   BILIDIR <0.2  --   --   --    BILITOT 0.6 0.7 0.4 0.4   ALKPHOS 93 113 157* 156*     Recent Labs     11/17/20  1555 11/18/20  0601 11/19/20  0621   INR 1.26* 1.14* 1.08     No results for input(s): Thierno Faria in the last 72 hours.     Microbiology:    Blood culture #1:   Lab Results   Component Value Date    BC No growth-preliminary  11/17/2020    BC No growth-preliminary  11/17/2020       Blood culture #2:No results found for: Tiffanie Saucedo    Organism:  Lab Results   Component Value Date    ORG Growth of Contaminants   11/17/2020       No results found for: LABGRAM    MRSA culture only:No results found for: [facility]    Urine culture:   Lab Results   Component Value Date    LABURIN  11/17/2020     Growth of Contaminants. The mixture of organisms present are not a common cause of urinary tract infections and probably represent skin frances or distal urethral frances. Respiratory culture: No results found for: CULTRESP    Aerobic and Anaerobic :  No results found for: LABAERO  No results found for: LABANAE    Urinalysis:      Lab Results   Component Value Date    NITRU NEGATIVE 11/17/2020    WBCUA 2-4 11/17/2020    BACTERIA NONE SEEN 11/17/2020    RBCUA 0-2 11/17/2020    BLOODU NEGATIVE 11/17/2020    SPECGRAV 1.020 11/17/2020    GLUCOSEU Negative 10/09/2020       Radiology:  XR CHEST PORTABLE   Final Result   Bibasilar patchy opacities correlate for pneumonia. Viral or atypical infection should be considered. **This report has been created using voice recognition software. It may contain minor errors which are inherent in voice recognition technology. **      Final report electronically signed by Dr. Maryan Alex on 11/17/2020 12:54 PM        Xr Chest Portable    Result Date: 11/17/2020  PROCEDURE: XR CHEST PORTABLE CLINICAL INFORMATION: covid. COMPARISON: No prior study. TECHNIQUE: Portable chest. FINDINGS: Bibasilar patchy opacities correlate for pneumonia. Viral or atypical infection should be considered. Costophrenic angles are preserved. Cardiomediastinal silhouette is within normal limits. No acute osseous findings. Bibasilar patchy opacities correlate for pneumonia. Viral or atypical infection should be considered. **This report has been created using voice recognition software. It may contain minor errors which are inherent in voice recognition technology. ** Final report electronically signed by Dr. Maryan Alex on 11/17/2020 12:54 PM      **This report has been created using voice recognition software. It may contain minor errors which are inherent in voice recognition technology. **  Electronically signed by Erwin Preston, PRAMOD - CNP on 11/19/2020 at 7:16 PM

## 2020-11-20 NOTE — CARE COORDINATION
DISASTER CHARTING    11/20/20, 2:23 PM EST    DISCHARGE ONGOING EVALUATION:     [facility] day: 3  Location: 6A-19/019-A Reason for admit: COVID-19 [U07.1]   Barriers to Discharge: Afebrile. O2 at 2L/NC. O2 sat 93%. IVF at 100/hr. Dexamethasone. Vits and Zinc. Tamiful for Flu A.   PCP: Matt Cheney MD  Patient Goals/Plan/Treatment Preferences: From home with spouse and other family members.

## 2020-11-21 LAB
ALBUMIN SERPL-MCNC: 3.3 G/DL (ref 3.5–5.1)
ALP BLD-CCNC: 132 U/L (ref 38–126)
ALT SERPL-CCNC: 468 U/L (ref 11–66)
ANION GAP SERPL CALCULATED.3IONS-SCNC: 13 MEQ/L (ref 8–16)
AST SERPL-CCNC: 136 U/L (ref 5–40)
BASOPHILS # BLD: 0 %
BASOPHILS ABSOLUTE: 0 THOU/MM3 (ref 0–0.1)
BILIRUB SERPL-MCNC: 0.3 MG/DL (ref 0.3–1.2)
BUN BLDV-MCNC: 28 MG/DL (ref 7–22)
CALCIUM SERPL-MCNC: 9.4 MG/DL (ref 8.5–10.5)
CHLORIDE BLD-SCNC: 105 MEQ/L (ref 98–111)
CO2: 23 MEQ/L (ref 23–33)
CREAT SERPL-MCNC: 0.8 MG/DL (ref 0.4–1.2)
D-DIMER QUANTITATIVE: 267 NG/ML FEU (ref 0–500)
EOSINOPHIL # BLD: 0 %
EOSINOPHILS ABSOLUTE: 0 THOU/MM3 (ref 0–0.4)
ERYTHROCYTE [DISTWIDTH] IN BLOOD BY AUTOMATED COUNT: 12.5 % (ref 11.5–14.5)
ERYTHROCYTE [DISTWIDTH] IN BLOOD BY AUTOMATED COUNT: 42.4 FL (ref 35–45)
GFR SERPL CREATININE-BSD FRML MDRD: > 90 ML/MIN/1.73M2
GLUCOSE BLD-MCNC: 203 MG/DL (ref 70–108)
HCT VFR BLD CALC: 40.6 % (ref 42–52)
HEMOGLOBIN: 13.2 GM/DL (ref 14–18)
IMMATURE GRANS (ABS): 0.64 THOU/MM3 (ref 0–0.07)
IMMATURE GRANULOCYTES: 6.5 %
INR BLD: 0.98 (ref 0.85–1.13)
LYMPHOCYTES # BLD: 8.5 %
LYMPHOCYTES ABSOLUTE: 0.8 THOU/MM3 (ref 1–4.8)
MCH RBC QN AUTO: 30.1 PG (ref 26–33)
MCHC RBC AUTO-ENTMCNC: 32.5 GM/DL (ref 32.2–35.5)
MCV RBC AUTO: 92.5 FL (ref 80–94)
MONOCYTES # BLD: 5.3 %
MONOCYTES ABSOLUTE: 0.5 THOU/MM3 (ref 0.4–1.3)
NUCLEATED RED BLOOD CELLS: 1 /100 WBC
PLATELET # BLD: 193 THOU/MM3 (ref 130–400)
PLATELET ESTIMATE: ADEQUATE
PMV BLD AUTO: 12 FL (ref 9.4–12.4)
POTASSIUM REFLEX MAGNESIUM: 4.5 MEQ/L (ref 3.5–5.2)
RBC # BLD: 4.39 MILL/MM3 (ref 4.7–6.1)
SCAN OF BLOOD SMEAR: NORMAL
SEG NEUTROPHILS: 86.2 %
SEGMENTED NEUTROPHILS ABSOLUTE COUNT: 8.4 THOU/MM3 (ref 1.8–7.7)
SODIUM BLD-SCNC: 141 MEQ/L (ref 135–145)
TOTAL PROTEIN: 5.7 G/DL (ref 6.1–8)
WBC # BLD: 9.8 THOU/MM3 (ref 4.8–10.8)

## 2020-11-21 PROCEDURE — 99232 SBSQ HOSP IP/OBS MODERATE 35: CPT | Performed by: NURSE PRACTITIONER

## 2020-11-21 PROCEDURE — 6370000000 HC RX 637 (ALT 250 FOR IP): Performed by: STUDENT IN AN ORGANIZED HEALTH CARE EDUCATION/TRAINING PROGRAM

## 2020-11-21 PROCEDURE — 6370000000 HC RX 637 (ALT 250 FOR IP): Performed by: NURSE PRACTITIONER

## 2020-11-21 PROCEDURE — 85610 PROTHROMBIN TIME: CPT

## 2020-11-21 PROCEDURE — 2580000003 HC RX 258: Performed by: NURSE PRACTITIONER

## 2020-11-21 PROCEDURE — 2580000003 HC RX 258: Performed by: FAMILY MEDICINE

## 2020-11-21 PROCEDURE — 85379 FIBRIN DEGRADATION QUANT: CPT

## 2020-11-21 PROCEDURE — 80053 COMPREHEN METABOLIC PANEL: CPT

## 2020-11-21 PROCEDURE — 6370000000 HC RX 637 (ALT 250 FOR IP): Performed by: INTERNAL MEDICINE

## 2020-11-21 PROCEDURE — 85025 COMPLETE CBC W/AUTO DIFF WBC: CPT

## 2020-11-21 PROCEDURE — 36415 COLL VENOUS BLD VENIPUNCTURE: CPT

## 2020-11-21 PROCEDURE — 6360000002 HC RX W HCPCS: Performed by: STUDENT IN AN ORGANIZED HEALTH CARE EDUCATION/TRAINING PROGRAM

## 2020-11-21 PROCEDURE — 1200000003 HC TELEMETRY R&B

## 2020-11-21 RX ADMIN — OXYCODONE HYDROCHLORIDE AND ACETAMINOPHEN 1000 MG: 500 TABLET ORAL at 08:18

## 2020-11-21 RX ADMIN — DEXAMETHASONE 6 MG: 4 TABLET ORAL at 08:17

## 2020-11-21 RX ADMIN — OSELTAMIVIR PHOSPHATE 75 MG: 75 CAPSULE ORAL at 20:51

## 2020-11-21 RX ADMIN — ENOXAPARIN SODIUM 30 MG: 30 INJECTION SUBCUTANEOUS at 20:51

## 2020-11-21 RX ADMIN — TAMSULOSIN HYDROCHLORIDE 0.4 MG: 0.4 CAPSULE ORAL at 08:18

## 2020-11-21 RX ADMIN — Medication 50 MG: at 08:18

## 2020-11-21 RX ADMIN — FAMOTIDINE 20 MG: 20 TABLET, FILM COATED ORAL at 20:51

## 2020-11-21 RX ADMIN — SODIUM CHLORIDE: 9 INJECTION, SOLUTION INTRAVENOUS at 20:56

## 2020-11-21 RX ADMIN — ENOXAPARIN SODIUM 30 MG: 30 INJECTION SUBCUTANEOUS at 08:17

## 2020-11-21 RX ADMIN — OSELTAMIVIR PHOSPHATE 75 MG: 75 CAPSULE ORAL at 08:18

## 2020-11-21 RX ADMIN — FAMOTIDINE 20 MG: 20 TABLET, FILM COATED ORAL at 08:17

## 2020-11-21 RX ADMIN — ASPIRIN 81 MG: 81 TABLET, COATED ORAL at 08:17

## 2020-11-21 RX ADMIN — SODIUM CHLORIDE: 9 INJECTION, SOLUTION INTRAVENOUS at 11:41

## 2020-11-21 RX ADMIN — SODIUM CHLORIDE: 9 INJECTION, SOLUTION INTRAVENOUS at 01:33

## 2020-11-21 RX ADMIN — Medication 2000 UNITS: at 08:18

## 2020-11-21 ASSESSMENT — PAIN SCALES - GENERAL
PAINLEVEL_OUTOF10: 0

## 2020-11-21 NOTE — PROGRESS NOTES
Hospitalist Progress Note      Patient:  Chuy Rousseau    Unit/Bed:6A-19/019-A  YOB: 1950  MRN: [id number]   Acct: [de-identified]   PCP: Suzie Ang MD  Date of Admission: 11/17/2020    Assessment/Plan:    1. Viral Pneumonia: Secondary to COVID-19 infection. Initial labwork revealed: CRP 8.25, , procalcitonin 0.15. CXR in the ED reported bibasilar patchy opacities correlate for pneumonia. Hold remdesivir due to elevated LFTs. Continue Decadron and Pepcid, Vitamin C, D, and Zinc.   Start convalescent plasma. 2. Acute hypoxic respiratory failure: Secondary to above issue. Patient requiring 1.5L of oxygen via nasal cannula. Continue to wean as tolerated. 3. Influenza A: Patient tested positive while in the ED. Continue Tamiflu (Day #4 of 5) and supportive care. 4. Elevated LFTs: Most likely secondary to viral infection with hepatitis as well as statin contribution, hold Lipitor. 5. BPH: Continue tamsulosin 0.4 mg daily. Nursing is to continue to monitor for urinary retention. 6. History of Hepatitis B:  Stable. Continue to monitor  7. Hyperlipidemia: History. Hold Lipitor as LFTs are trending up. 8. Psoriasis: History. Patient experiences significant itching of body and scalp. Continue home ketoconazole 2 % shampoo daily and triamcinolone 0.025% cream four times daily as needed. Chief Complaint: Worsening shortness of breath with progressive weakness     Initial H and P:-    **Per Chart Review:  \"HPI as well as past medical history was obtained by patient as well as patient's daughter who was in the room with patient.     76-year-old male presented to [facility] emergency room department for progressively worsening weakness and fatigue as well as unable to keep fever down.   Patient states on November 3 he worked in the [employer] during election and by November 5 he felt very fatigued and said he had flulike symptoms including but not limited to joint pain, muscle pain, body aches. Patient symptoms got progressively worse in which he also expressed he had no appetite and was very nauseous without vomiting. Patient denied any chest pain, palpitations, and loss of taste or smell, however admits to shortness of breath/fatigue with exertion. Patient states he was tested by his PCP on 11/12/2020 and found out yesterday to be Covid positive.   Patient has significant past medical history of BPH, hepatitis B, hyperlipidemia, and psoriasis. Patient denies any current lung conditions and denies any supplemental oxygen at home.   While in the emergency room department patient tested positive for influenza A as well as COVID-19. On arrival patient was afebrile. Chest x-ray reported: Bibasilar patchy opacities correlate for pneumonia. \"    Subjective (past 24 hours):   Patient sitting up in the chair at the time of the interview. States that he continues to feel better in comparison to yesterday. Patient states that his generalized weakness, fatigue, and malaise with an intermittent cough are improving. He denied any chest pain, shortness of breath, nausea, vomiting, abdominal pain, diarrhea, constipation, urinary complaints, lightheadedness, dizziness, headaches, changes in vision, fever, or chills. He was updated on the current plan of care, verbalizes understanding, and had no other needs or questions at this time. Past medical history, family history, social history and allergies reviewed again and is unchanged since admission. ROS (14 point review of systems completed. Pertinent positives noted. Otherwise ROS is negative) :  GENERAL: No fever,chills, or night sweats. SKIN: No lesions or rashes. HEAD: No headaches or recent injury. EYES: No acute changes in vision, no diplopia or blurred vision. EARS: No hearing loss, no tinnitus. NOSE/THROAT: No rhinorrhea or pharyngitis, no nasal drainage.   NECK: No lumps or Conjunctivae/corneas clear. Neck: Supple, with full range of motion. No jugular venous distention. Trachea midline. Respiratory:  Normal respiratory effort. Clear to auscultation, bilaterally without Rales/Wheezes/Rhonchi. Cardiovascular: Regular rate and rhythm with normal S1/S2 without murmurs, rubs or gallops. Abdomen: Soft, non-tender, non-distended with normal bowel sounds. Musculoskeletal: Passive and active ROM x 4 extremities. Skin: Skin color, texture, turgor normal.  No rashes or lesions. Neurologic:  Neurovascularly intact without any focal sensory/motor deficits. Cranial nerves: II-XII intact, grossly non-focal.  Psychiatric: Alert and oriented to person, place, time, and situation. Thought content appropriate, normal insight  Capillary Refill: Brisk,< 3 seconds   Peripheral Pulses: +2 palpable, equal bilaterally     Labs:   Recent Labs     11/18/20  0601 11/19/20  0621 11/20/20  0629   WBC 3.7* 7.5 10.1   HGB 13.2* 12.9* 13.2*   HCT 40.2* 39.5* 40.0*    183 187     Recent Labs     11/18/20  0601 11/19/20  0621 11/20/20  0629    137 143   K 4.5 4.2 4.3    104 105   CO2 22* 24 25   BUN 26* 28* 31*   CREATININE 0.9 0.9 1.0   CALCIUM 8.8 9.2 9.6     Recent Labs     11/18/20  0601 11/19/20  0621 11/20/20  0629   * 151* 136*   * 266* 350*   BILITOT 0.4 0.4 0.3   ALKPHOS 157* 156* 143*     Recent Labs     11/18/20  0601 11/19/20  0621 11/20/20  0629   INR 1.14* 1.08 0.91     No results for input(s): Memo Arias in the last 72 hours.     Microbiology:    Blood culture #1:   Lab Results   Component Value Date    BC No growth-preliminary  11/17/2020    BC No growth-preliminary  11/17/2020       Blood culture #2:No results found for: Mohsen Deshpande    Organism:  Lab Results   Component Value Date    ORG Growth of Contaminants   11/17/2020       No results found for: LABGRAM    MRSA culture only:No results found for: [facility]    Urine culture:   Lab Results   Component Value Date    LABURIN  11/17/2020     Growth of Contaminants. The mixture of organisms present are not a common cause of urinary tract infections and probably represent skin frances or distal urethral frances. Respiratory culture: No results found for: CULTRESP    Aerobic and Anaerobic :  No results found for: LABAERO  No results found for: LABANAE    Urinalysis:      Lab Results   Component Value Date    NITRU NEGATIVE 11/17/2020    WBCUA 2-4 11/17/2020    BACTERIA NONE SEEN 11/17/2020    RBCUA 0-2 11/17/2020    BLOODU NEGATIVE 11/17/2020    SPECGRAV 1.020 11/17/2020    GLUCOSEU Negative 10/09/2020       Radiology:  US LIVER   Final Result   1. No sonographic finding for cholelithiasis or acute cholecystitis. 2.  Nonspecific round hyperechoic area in the right hepatic lobe measuring    2.3 x 2 x 2 cm may represent a hemangioma. This may be confirmed with    multiphasic CT or MRI if clinically indicated. This document has been electronically signed by: Kristan Serrano MD on    11/20/2020 02:59 AM         XR CHEST PORTABLE   Final Result   Bibasilar patchy opacities correlate for pneumonia. Viral or atypical infection should be considered. **This report has been created using voice recognition software. It may contain minor errors which are inherent in voice recognition technology. **      Final report electronically signed by Dr. Lisa Goins on 11/17/2020 12:54 PM        Xr Chest Portable    Result Date: 11/17/2020  PROCEDURE: XR CHEST PORTABLE CLINICAL INFORMATION: covid. COMPARISON: No prior study. TECHNIQUE: Portable chest. FINDINGS: Bibasilar patchy opacities correlate for pneumonia. Viral or atypical infection should be considered. Costophrenic angles are preserved. Cardiomediastinal silhouette is within normal limits. No acute osseous findings. Bibasilar patchy opacities correlate for pneumonia. Viral or atypical infection should be considered.  **This report has been created using voice recognition software. It may contain minor errors which are inherent in voice recognition technology. ** Final report electronically signed by Dr. Nic Ty on 11/17/2020 12:54 PM      **This report has been created using voice recognition software. It may contain minor errors which are inherent in voice recognition technology. **  Electronically signed by PRAMOD Lockett CNP on 11/20/2020 at 11:13 PM

## 2020-11-22 LAB
ALBUMIN SERPL-MCNC: 3.5 G/DL (ref 3.5–5.1)
ALP BLD-CCNC: 117 U/L (ref 38–126)
ALT SERPL-CCNC: 379 U/L (ref 11–66)
ANION GAP SERPL CALCULATED.3IONS-SCNC: 12 MEQ/L (ref 8–16)
AST SERPL-CCNC: 75 U/L (ref 5–40)
BASOPHILS # BLD: 0.4 %
BASOPHILS ABSOLUTE: 0 THOU/MM3 (ref 0–0.1)
BILIRUB SERPL-MCNC: 0.4 MG/DL (ref 0.3–1.2)
BUN BLDV-MCNC: 32 MG/DL (ref 7–22)
CALCIUM SERPL-MCNC: 9.2 MG/DL (ref 8.5–10.5)
CHLORIDE BLD-SCNC: 103 MEQ/L (ref 98–111)
CO2: 24 MEQ/L (ref 23–33)
CREAT SERPL-MCNC: 0.7 MG/DL (ref 0.4–1.2)
D-DIMER QUANTITATIVE: 343 NG/ML FEU (ref 0–500)
EOSINOPHIL # BLD: 0 %
EOSINOPHILS ABSOLUTE: 0 THOU/MM3 (ref 0–0.4)
ERYTHROCYTE [DISTWIDTH] IN BLOOD BY AUTOMATED COUNT: 12.4 % (ref 11.5–14.5)
ERYTHROCYTE [DISTWIDTH] IN BLOOD BY AUTOMATED COUNT: 41.4 FL (ref 35–45)
GFR SERPL CREATININE-BSD FRML MDRD: > 90 ML/MIN/1.73M2
GLUCOSE BLD-MCNC: 205 MG/DL (ref 70–108)
HCT VFR BLD CALC: 39.4 % (ref 42–52)
HEMOGLOBIN: 13 GM/DL (ref 14–18)
IMMATURE GRANS (ABS): 0.93 THOU/MM3 (ref 0–0.07)
IMMATURE GRANULOCYTES: 8.3 %
INR BLD: 0.97 (ref 0.85–1.13)
LYMPHOCYTES # BLD: 10.1 %
LYMPHOCYTES ABSOLUTE: 1.1 THOU/MM3 (ref 1–4.8)
MCH RBC QN AUTO: 30.1 PG (ref 26–33)
MCHC RBC AUTO-ENTMCNC: 33 GM/DL (ref 32.2–35.5)
MCV RBC AUTO: 91.2 FL (ref 80–94)
MONOCYTES # BLD: 5.9 %
MONOCYTES ABSOLUTE: 0.7 THOU/MM3 (ref 0.4–1.3)
NUCLEATED RED BLOOD CELLS: 0 /100 WBC
PLATELET # BLD: 220 THOU/MM3 (ref 130–400)
PLATELET ESTIMATE: ADEQUATE
PMV BLD AUTO: 12.1 FL (ref 9.4–12.4)
POTASSIUM REFLEX MAGNESIUM: 4.6 MEQ/L (ref 3.5–5.2)
RBC # BLD: 4.32 MILL/MM3 (ref 4.7–6.1)
SCAN OF BLOOD SMEAR: NORMAL
SEG NEUTROPHILS: 75.3 %
SEGMENTED NEUTROPHILS ABSOLUTE COUNT: 8.5 THOU/MM3 (ref 1.8–7.7)
SODIUM BLD-SCNC: 139 MEQ/L (ref 135–145)
TOTAL PROTEIN: 5 G/DL (ref 6.1–8)
WBC # BLD: 11.3 THOU/MM3 (ref 4.8–10.8)

## 2020-11-22 PROCEDURE — 85025 COMPLETE CBC W/AUTO DIFF WBC: CPT

## 2020-11-22 PROCEDURE — 2580000003 HC RX 258: Performed by: NURSE PRACTITIONER

## 2020-11-22 PROCEDURE — 6370000000 HC RX 637 (ALT 250 FOR IP): Performed by: STUDENT IN AN ORGANIZED HEALTH CARE EDUCATION/TRAINING PROGRAM

## 2020-11-22 PROCEDURE — 6370000000 HC RX 637 (ALT 250 FOR IP): Performed by: INTERNAL MEDICINE

## 2020-11-22 PROCEDURE — 80053 COMPREHEN METABOLIC PANEL: CPT

## 2020-11-22 PROCEDURE — 36415 COLL VENOUS BLD VENIPUNCTURE: CPT

## 2020-11-22 PROCEDURE — 94761 N-INVAS EAR/PLS OXIMETRY MLT: CPT

## 2020-11-22 PROCEDURE — 85379 FIBRIN DEGRADATION QUANT: CPT

## 2020-11-22 PROCEDURE — 2580000003 HC RX 258: Performed by: STUDENT IN AN ORGANIZED HEALTH CARE EDUCATION/TRAINING PROGRAM

## 2020-11-22 PROCEDURE — 99232 SBSQ HOSP IP/OBS MODERATE 35: CPT | Performed by: NURSE PRACTITIONER

## 2020-11-22 PROCEDURE — 85610 PROTHROMBIN TIME: CPT

## 2020-11-22 PROCEDURE — 6370000000 HC RX 637 (ALT 250 FOR IP): Performed by: NURSE PRACTITIONER

## 2020-11-22 PROCEDURE — 1200000003 HC TELEMETRY R&B

## 2020-11-22 PROCEDURE — 6360000002 HC RX W HCPCS: Performed by: STUDENT IN AN ORGANIZED HEALTH CARE EDUCATION/TRAINING PROGRAM

## 2020-11-22 RX ADMIN — OSELTAMIVIR PHOSPHATE 75 MG: 75 CAPSULE ORAL at 08:44

## 2020-11-22 RX ADMIN — ENOXAPARIN SODIUM 30 MG: 30 INJECTION SUBCUTANEOUS at 08:45

## 2020-11-22 RX ADMIN — Medication 2000 UNITS: at 08:45

## 2020-11-22 RX ADMIN — Medication 50 MG: at 08:44

## 2020-11-22 RX ADMIN — DEXAMETHASONE 6 MG: 4 TABLET ORAL at 08:44

## 2020-11-22 RX ADMIN — TAMSULOSIN HYDROCHLORIDE 0.4 MG: 0.4 CAPSULE ORAL at 08:45

## 2020-11-22 RX ADMIN — OXYCODONE HYDROCHLORIDE AND ACETAMINOPHEN 1000 MG: 500 TABLET ORAL at 08:44

## 2020-11-22 RX ADMIN — SODIUM CHLORIDE, PRESERVATIVE FREE 10 ML: 5 INJECTION INTRAVENOUS at 08:44

## 2020-11-22 RX ADMIN — FAMOTIDINE 20 MG: 20 TABLET, FILM COATED ORAL at 08:44

## 2020-11-22 RX ADMIN — ENOXAPARIN SODIUM 30 MG: 30 INJECTION SUBCUTANEOUS at 20:38

## 2020-11-22 RX ADMIN — SODIUM CHLORIDE: 9 INJECTION, SOLUTION INTRAVENOUS at 11:38

## 2020-11-22 RX ADMIN — ASPIRIN 81 MG: 81 TABLET, COATED ORAL at 08:45

## 2020-11-22 RX ADMIN — FAMOTIDINE 20 MG: 20 TABLET, FILM COATED ORAL at 20:38

## 2020-11-22 ASSESSMENT — PAIN SCALES - GENERAL
PAINLEVEL_OUTOF10: 0

## 2020-11-22 NOTE — PROGRESS NOTES
Report was called to [facility] on [address]  Patient is being transferred to room 5k-01.

## 2020-11-22 NOTE — PROGRESS NOTES
Hospitalist Progress Note      Patient:  Shaheen Carpenter    Unit/Bed:6A-19/019-A  YOB: 1950  MRN: [id number]   Acct: [de-identified]   PCP: Chelsi Perdue MD  Date of Admission: 11/17/2020    Assessment/Plan:    1. Viral Pneumonia: Secondary to COVID-19 infection. Initial labwork revealed: CRP 8.25, , procalcitonin 0.15. CXR in the ED reported bibasilar patchy opacities correlate for pneumonia. Hold remdesivir due to elevated LFTs. Continue Decadron and Pepcid, Vitamin C, D, and Zinc.   Start convalescent plasma. 2. Acute hypoxic respiratory failure: Resolved. Secondary to above issue. Currently on room air with adequate O2 saturations. 3. Influenza A: Patient tested positive while in the ED. Continue Tamiflu (Day #5 of 5) and supportive care. 4. Elevated LFTs: Most likely secondary to viral infection with hepatitis as well as statin contribution, hold Lipitor. 5. BPH: Continue tamsulosin 0.4 mg daily. Nursing is to continue to monitor for urinary retention. 6. History of Hepatitis B:  Stable. Continue to monitor  7. Hyperlipidemia: History. Hold Lipitor as LFTs are trending up. 8. Psoriasis: History. Patient experiences significant itching of body and scalp. Continue home ketoconazole 2 % shampoo daily and triamcinolone 0.025% cream four times daily as needed. Chief Complaint: Worsening shortness of breath with progressive weakness     Initial H and P:-    **Per Chart Review:  \"HPI as well as past medical history was obtained by patient as well as patient's daughter who was in the room with patient.     21-year-old male presented to [facility] emergency room department for progressively worsening weakness and fatigue as well as unable to keep fever down.   Patient states on November 3 he worked in the [employer] during election and by November 5 he felt very fatigued and said he had flulike symptoms including but not limited to joint pain, muscle pain, body aches. Patient symptoms got progressively worse in which he also expressed he had no appetite and was very nauseous without vomiting. Patient denied any chest pain, palpitations, and loss of taste or smell, however admits to shortness of breath/fatigue with exertion. Patient states he was tested by his PCP on 11/12/2020 and found out yesterday to be Covid positive.   Patient has significant past medical history of BPH, hepatitis B, hyperlipidemia, and psoriasis. Patient denies any current lung conditions and denies any supplemental oxygen at home.   While in the emergency room department patient tested positive for influenza A as well as COVID-19. On arrival patient was afebrile. Chest x-ray reported: Bibasilar patchy opacities correlate for pneumonia. \"    Subjective (past 24 hours):   Patient sitting up in the chair at the time of the interview. States that he continues to feel better in comparison to yesterday. Patient states that his generalized weakness, fatigue, and malaise with an intermittent cough continues to improve. He denied any chest pain, shortness of breath, nausea, vomiting, abdominal pain, diarrhea, constipation, urinary complaints, lightheadedness, dizziness, headaches, changes in vision, fever, or chills. He was updated on the current plan of care, verbalizes understanding, and had no other needs or questions at this time. Past medical history, family history, social history and allergies reviewed again and is unchanged since admission. ROS (14 point review of systems completed. Pertinent positives noted. Otherwise ROS is negative) :  GENERAL: No fever,chills, or night sweats. SKIN: No lesions or rashes. HEAD: No headaches or recent injury. EYES: No acute changes in vision, no diplopia or blurred vision. EARS: No hearing loss, no tinnitus. NOSE/THROAT: No rhinorrhea or pharyngitis, no nasal drainage.   NECK: No lumps or unusual neck stiffness. PULMONARY: Respirations easy and non-labored, no acute distress. CARDIAC: No chest pain, pressure, Negative for lower leg edema. GI: Abdomen is soft and non-tender, non-distended. PERIPHERAL VASCULAR: No intermittent claudication or unusual leg cramps. MUSCULOSKELETAL: Occasional arthralgias, myalgias with fatigue/malaise. NEUROLOGICAL: Denies any headache, near syncope, seizures or syncope. HEMATOLOGIC:  No unusual bruising or bleeding. PSYCH: Denies any homicidal or suicidial ideations. Medications:  Reviewed    Infusion Medications    sodium chloride 75 mL/hr at 11/21/20 2056     Scheduled Medications    sodium chloride  20 mL Intravenous Once    famotidine  20 mg Oral BID    aspirin EC  81 mg Oral Daily    [Held by provider] atorvastatin  10 mg Oral Daily    tamsulosin  0.4 mg Oral Daily    enoxaparin  30 mg Subcutaneous BID    sodium chloride flush  10 mL Intravenous 2 times per day    Vitamin D  2,000 Units Oral Daily    oseltamivir  75 mg Oral BID    dexamethasone  6 mg Oral Daily    zinc sulfate  50 mg Oral Daily    vitamin C  1,000 mg Oral Daily     PRN Meds: fluocinonide, ketoconazole, acetaminophen **OR** acetaminophen, sodium chloride flush, polyethylene glycol, promethazine **OR** ondansetron, potassium chloride, magnesium sulfate, guaiFENesin-dextromethorphan      Intake/Output Summary (Last 24 hours) at 11/21/2020 2322  Last data filed at 11/21/2020 1913  Gross per 24 hour   Intake 3312 ml   Output 1525 ml   Net 1787 ml       Diet:  DIET GENERAL;  Dietary Nutrition Supplements: Low Volume Supplement    Exam:  BP (!) 154/88   Pulse 62   Temp 98.7 °F (37.1 °C) (Oral)   Resp 19   Ht 5' 10\" (1.778 m)   Wt 172 lb 11.2 oz (78.3 kg)   SpO2 94%   BMI 24.78 kg/m²     General appearance: Alert and appropriate, pleasant geriatric male. No apparent distress, appears stated age and cooperative. HEENT: Pupils equal, round, and reactive to light.  Conjunctivae/corneas clear.  Neck: Supple, with full range of motion. No jugular venous distention. Trachea midline. Respiratory:  Normal respiratory effort. Clear to auscultation, bilaterally without Rales/Wheezes/Rhonchi. Cardiovascular: Regular rate and rhythm with normal S1/S2 without murmurs, rubs or gallops. Abdomen: Soft, non-tender, non-distended with normal bowel sounds. Musculoskeletal: Passive and active ROM x 4 extremities. Skin: Skin color, texture, turgor normal.  No rashes or lesions. Neurologic:  Neurovascularly intact without any focal sensory/motor deficits. Cranial nerves: II-XII intact, grossly non-focal.  Psychiatric: Alert and oriented to person, place, time, and situation. Thought content appropriate, normal insight  Capillary Refill: Brisk,< 3 seconds   Peripheral Pulses: +2 palpable, equal bilaterally     Labs:   Recent Labs     11/19/20 0621 11/20/20 0629 11/21/20  1301   WBC 7.5 10.1 9.8   HGB 12.9* 13.2* 13.2*   HCT 39.5* 40.0* 40.6*    187 193     Recent Labs     11/19/20  0621 11/20/20  0629 11/21/20  1301    143 141   K 4.2 4.3 4.5    105 105   CO2 24 25 23   BUN 28* 31* 28*   CREATININE 0.9 1.0 0.8   CALCIUM 9.2 9.6 9.4     Recent Labs     11/19/20  0621 11/20/20  0629 11/21/20  1301   * 136* 136*   * 350* 468*   BILITOT 0.4 0.3 0.3   ALKPHOS 156* 143* 132*     Recent Labs     11/19/20  0621 11/20/20  0629 11/21/20  1301   INR 1.08 0.91 0.98     No results for input(s): CKTOTAL, TROPONINI in the last 72 hours.     Microbiology:    Blood culture #1:   Lab Results   Component Value Date    BC No growth-preliminary  11/17/2020    BC No growth-preliminary  11/17/2020       Blood culture #2:No results found for: Rodrick Watson    Organism:  Lab Results   Component Value Date    ORG Growth of Contaminants   11/17/2020       No results found for: LABGRAM    MRSA culture only:No results found for: [facility]    Urine culture:   Lab Results   Component Value Date    Gaudencio Nunez 11/17/2020     Growth of Contaminants. The mixture of organisms present are not a common cause of urinary tract infections and probably represent skin frances or distal urethral frances. Respiratory culture: No results found for: CULTRESP    Aerobic and Anaerobic :  No results found for: LABAERO  No results found for: LABANAE    Urinalysis:      Lab Results   Component Value Date    NITRU NEGATIVE 11/17/2020    WBCUA 2-4 11/17/2020    BACTERIA NONE SEEN 11/17/2020    RBCUA 0-2 11/17/2020    BLOODU NEGATIVE 11/17/2020    SPECGRAV 1.020 11/17/2020    GLUCOSEU Negative 10/09/2020       Radiology:  US LIVER   Final Result   1. No sonographic finding for cholelithiasis or acute cholecystitis. 2.  Nonspecific round hyperechoic area in the right hepatic lobe measuring    2.3 x 2 x 2 cm may represent a hemangioma. This may be confirmed with    multiphasic CT or MRI if clinically indicated. This document has been electronically signed by: Sharon Dale MD on    11/20/2020 02:59 AM         XR CHEST PORTABLE   Final Result   Bibasilar patchy opacities correlate for pneumonia. Viral or atypical infection should be considered. **This report has been created using voice recognition software. It may contain minor errors which are inherent in voice recognition technology. **      Final report electronically signed by Dr. Cheryle Hunt on 11/17/2020 12:54 PM        Xr Chest Portable    Result Date: 11/17/2020  PROCEDURE: XR CHEST PORTABLE CLINICAL INFORMATION: covid. COMPARISON: No prior study. TECHNIQUE: Portable chest. FINDINGS: Bibasilar patchy opacities correlate for pneumonia. Viral or atypical infection should be considered. Costophrenic angles are preserved. Cardiomediastinal silhouette is within normal limits. No acute osseous findings. Bibasilar patchy opacities correlate for pneumonia. Viral or atypical infection should be considered.  **This report has been created using voice recognition software. It may contain minor errors which are inherent in voice recognition technology. ** Final report electronically signed by Dr. Luisa Kimble on 11/17/2020 12:54 PM      **This report has been created using voice recognition software. It may contain minor errors which are inherent in voice recognition technology. **  Electronically signed by PRAMOD Arambula CNP on 11/21/2020 at 11:22 PM

## 2020-11-22 NOTE — PROGRESS NOTES
Patient arrived to 5K-01, alert and oriented x4 with pleasant affect. Oriented to room, assessed and vital signs taken. Report was received at approx 1510 from [facility].

## 2020-11-22 NOTE — PROGRESS NOTES
Hospitalist Progress Note      Patient:  Francisco Allan    Unit/Bed:5K-01/001-A  YOB: 1950  MRN: [id number]   Acct: [de-identified]   PCP: Saman Carrizales MD  Date of Admission: 11/17/2020    Assessment/Plan:    1. Viral Pneumonia: Secondary to COVID-19 infection. Initial labwork revealed: CRP 8.25, , procalcitonin 0.15. CXR in the ED reported bibasilar patchy opacities correlate for pneumonia. Hold remdesivir due to elevated LFTs. Continue Decadron and Pepcid, Vitamin C, D, and Zinc.   Patient received convalescent plasma (risks versus benefits were explained to the patient and he verbalizes understanding, accepting therapy). 2. Acute hypoxic respiratory failure: Resolved. Secondary to above issue. Currently on room air with adequate O2 saturations. 3. Influenza A: Patient tested positive while in the ED. patient tested received 5-day course of Tamiflu, continue supportive care. .  4. Elevated LFTs: Not at goal, continuing to trend upward. Possibly secondary to viral infection with hepatitis as well as statin contribution, hold Lipitor. Consult GI for recommendations and to further evaluate, appreciate assistance. 5. BPH: Continue tamsulosin 0.4 mg daily. Nursing is to continue to monitor for urinary retention. 6. History of Hepatitis B:  Stable. Continue to monitor  7. Hyperlipidemia: History. Hold Lipitor as LFTs are trending up. 8. Psoriasis: History. Patient experiences significant itching of body and scalp. Continue home ketoconazole 2 % shampoo daily and triamcinolone 0.025% cream four times daily as needed. Disposition: Discharge pending GI clearance.     Chief Complaint: Worsening shortness of breath with progressive weakness     Initial H and P:-    **Per Chart Review:  \"HPI as well as past medical history was obtained by patient as well as patient's daughter who was in the room with patient.     69-year-old male presented to [facility] emergency room department for progressively worsening weakness and fatigue as well as unable to keep fever down. Patient states on November 3 he worked in the [employer] during election and by November 5 he felt very fatigued and said he had flulike symptoms including but not limited to joint pain, muscle pain, body aches. Patient symptoms got progressively worse in which he also expressed he had no appetite and was very nauseous without vomiting. Patient denied any chest pain, palpitations, and loss of taste or smell, however admits to shortness of breath/fatigue with exertion. Patient states he was tested by his PCP on 11/12/2020 and found out yesterday to be Covid positive.   Patient has significant past medical history of BPH, hepatitis B, hyperlipidemia, and psoriasis. Patient denies any current lung conditions and denies any supplemental oxygen at home.   While in the emergency room department patient tested positive for influenza A as well as COVID-19. On arrival patient was afebrile. Chest x-ray reported: Bibasilar patchy opacities correlate for pneumonia. \"    Subjective (past 24 hours):   Patient sitting up in the chair at the time of the interview. States that he continues to feel better in comparison to yesterday. Patient states that his generalized weakness, fatigue, and malaise with an intermittent cough continues to improve. He denied any chest pain, shortness of breath, nausea, vomiting, abdominal pain, diarrhea, constipation, urinary complaints, lightheadedness, dizziness, headaches, changes in vision, fever, or chills. He was updated on the current plan of care, verbalizes understanding, and had no other needs or questions at this time. Past medical history, family history, social history and allergies reviewed again and is unchanged since admission. ROS (14 point review of systems completed. Pertinent positives noted.  Otherwise ROS is negative) :  GENERAL: Wt 175 lb 6.4 oz (79.6 kg)   SpO2 96%   BMI 25.17 kg/m²     General appearance: Alert and appropriate, pleasant geriatric male. No apparent distress, appears stated age and cooperative. HEENT: Pupils equal, round, and reactive to light. Conjunctivae/corneas clear. Neck: Supple, with full range of motion. No jugular venous distention. Trachea midline. Respiratory:  Normal respiratory effort. Clear to auscultation, bilaterally without Rales/Wheezes/Rhonchi. Cardiovascular: Regular rate and rhythm with normal S1/S2 without murmurs, rubs or gallops. Abdomen: Soft, non-tender, non-distended with normal bowel sounds. Musculoskeletal: Passive and active ROM x 4 extremities. Skin: Skin color, texture, turgor normal.  No rashes or lesions. Neurologic:  Neurovascularly intact without any focal sensory/motor deficits. Cranial nerves: II-XII intact, grossly non-focal.  Psychiatric: Alert and oriented to person, place, time, and situation. Thought content appropriate, normal insight  Capillary Refill: Brisk,< 3 seconds   Peripheral Pulses: +2 palpable, equal bilaterally     Labs:   Recent Labs     11/20/20  0629 11/21/20  1301 11/22/20  1314   WBC 10.1 9.8 11.3*   HGB 13.2* 13.2* 13.0*   HCT 40.0* 40.6* 39.4*    193 220     Recent Labs     11/20/20  0629 11/21/20  1301 11/22/20  1314    141 139   K 4.3 4.5 4.6    105 103   CO2 25 23 24   BUN 31* 28* 32*   CREATININE 1.0 0.8 0.7   CALCIUM 9.6 9.4 9.2     Recent Labs     11/20/20  0629 11/21/20  1301 11/22/20  1314   * 136* 75*   * 468* 379*   BILITOT 0.3 0.3 0.4   ALKPHOS 143* 132* 117     Recent Labs     11/20/20  0629 11/21/20  1301 11/22/20  1314   INR 0.91 0.98 0.97     No results for input(s): CKTOTAL, TROPONINI in the last 72 hours.     Microbiology:    Blood culture #1:   Lab Results   Component Value Date    BC No growth-preliminary  11/17/2020    BC No growth-preliminary  11/17/2020       Blood culture #2:No results found for: BLOODCULT2    Organism:  Lab Results   Component Value Date    ORG Growth of Contaminants   11/17/2020       No results found for: LABGRAM    MRSA culture only:No results found for: [facility]    Urine culture:   Lab Results   Component Value Date    [place]  11/17/2020     Growth of Contaminants. The mixture of organisms present are not a common cause of urinary tract infections and probably represent skin frances or distal urethral frances. Respiratory culture: No results found for: CULTRESP    Aerobic and Anaerobic :  No results found for: LABAERO  No results found for: LABANAE    Urinalysis:      Lab Results   Component Value Date    NITRU NEGATIVE 11/17/2020    WBCUA 2-4 11/17/2020    BACTERIA NONE SEEN 11/17/2020    RBCUA 0-2 11/17/2020    BLOODU NEGATIVE 11/17/2020    SPECGRAV 1.020 11/17/2020    GLUCOSEU Negative 10/09/2020       Radiology:  US LIVER   Final Result   1. No sonographic finding for cholelithiasis or acute cholecystitis. 2.  Nonspecific round hyperechoic area in the right hepatic lobe measuring    2.3 x 2 x 2 cm may represent a hemangioma. This may be confirmed with    multiphasic CT or MRI if clinically indicated. This document has been electronically signed by: Sergo Ludwig MD on    11/20/2020 02:59 AM         XR CHEST PORTABLE   Final Result   Bibasilar patchy opacities correlate for pneumonia. Viral or atypical infection should be considered. **This report has been created using voice recognition software. It may contain minor errors which are inherent in voice recognition technology. **      Final report electronically signed by Dr. Luisa Kimble on 11/17/2020 12:54 PM        Xr Chest Portable    Result Date: 11/17/2020  PROCEDURE: XR CHEST PORTABLE CLINICAL INFORMATION: covid. COMPARISON: No prior study. TECHNIQUE: Portable chest. FINDINGS: Bibasilar patchy opacities correlate for pneumonia. Viral or atypical infection should be considered.  Costophrenic angles are preserved. Cardiomediastinal silhouette is within normal limits. No acute osseous findings. Bibasilar patchy opacities correlate for pneumonia. Viral or atypical infection should be considered. **This report has been created using voice recognition software. It may contain minor errors which are inherent in voice recognition technology. ** Final report electronically signed by Dr. Lisa Goins on 11/17/2020 12:54 PM      **This report has been created using voice recognition software. It may contain minor errors which are inherent in voice recognition technology. **  Electronically signed by PRAMOD Garcia CNP on 11/22/2020 at 6:42 PM

## 2020-11-22 NOTE — PROGRESS NOTES
Patient's daughter Allison Grande was updated of her father's condition and that he is being transfered to [address] [place] [address].

## 2020-11-23 ENCOUNTER — TELEPHONE (OUTPATIENT)
Dept: FAMILY MEDICINE CLINIC | Age: 70
End: 2020-11-23

## 2020-11-23 VITALS
HEIGHT: 70 IN | DIASTOLIC BLOOD PRESSURE: 90 MMHG | BODY MASS INDEX: 25.11 KG/M2 | SYSTOLIC BLOOD PRESSURE: 160 MMHG | TEMPERATURE: 96.6 F | HEART RATE: 57 BPM | RESPIRATION RATE: 54 BRPM | WEIGHT: 175.4 LBS | OXYGEN SATURATION: 91 %

## 2020-11-23 PROBLEM — R74.9 ABNORMAL LEVEL OF SERUM ENZYME: Status: ACTIVE | Noted: 2020-11-23

## 2020-11-23 LAB
ALBUMIN SERPL-MCNC: 3.3 G/DL (ref 3.5–5.1)
ALP BLD-CCNC: 109 U/L (ref 38–126)
ALT SERPL-CCNC: 366 U/L (ref 11–66)
ANION GAP SERPL CALCULATED.3IONS-SCNC: 11 MEQ/L (ref 8–16)
AST SERPL-CCNC: 66 U/L (ref 5–40)
ATYPICAL LYMPHOCYTES: ABNORMAL %
BASOPHILS # BLD: 0.2 %
BASOPHILS ABSOLUTE: 0 THOU/MM3 (ref 0–0.1)
BILIRUB SERPL-MCNC: 0.3 MG/DL (ref 0.3–1.2)
BILIRUBIN DIRECT: < 0.2 MG/DL (ref 0–0.3)
BLOOD CULTURE, ROUTINE: NORMAL
BLOOD CULTURE, ROUTINE: NORMAL
BUN BLDV-MCNC: 30 MG/DL (ref 7–22)
CALCIUM SERPL-MCNC: 9.2 MG/DL (ref 8.5–10.5)
CHLORIDE BLD-SCNC: 101 MEQ/L (ref 98–111)
CO2: 25 MEQ/L (ref 23–33)
CREAT SERPL-MCNC: 0.9 MG/DL (ref 0.4–1.2)
D-DIMER QUANTITATIVE: 217 NG/ML FEU (ref 0–500)
EOSINOPHIL # BLD: 0 %
EOSINOPHILS ABSOLUTE: 0 THOU/MM3 (ref 0–0.4)
ERYTHROCYTE [DISTWIDTH] IN BLOOD BY AUTOMATED COUNT: 12.5 % (ref 11.5–14.5)
ERYTHROCYTE [DISTWIDTH] IN BLOOD BY AUTOMATED COUNT: 41.5 FL (ref 35–45)
FERRITIN: 936 NG/ML (ref 22–322)
GFR SERPL CREATININE-BSD FRML MDRD: 83 ML/MIN/1.73M2
GLUCOSE BLD-MCNC: 149 MG/DL (ref 70–108)
HBV SURFACE AB TITR SER: POSITIVE {TITER}
HCT VFR BLD CALC: 39.4 % (ref 42–52)
HEMOGLOBIN: 12.9 GM/DL (ref 14–18)
HEPATITIS B SURFACE ANTIGEN: NEGATIVE
HEPATITIS C ANTIBODY: NEGATIVE
IMMATURE GRANS (ABS): 1.9 THOU/MM3 (ref 0–0.07)
IMMATURE GRANULOCYTES: 15.1 %
INR BLD: 0.96 (ref 0.85–1.13)
IRON SATURATION: 72 % (ref 20–50)
IRON: 154 UG/DL (ref 65–195)
LYMPHOCYTES # BLD: 12.4 %
LYMPHOCYTES ABSOLUTE: 1.6 THOU/MM3 (ref 1–4.8)
MCH RBC QN AUTO: 30.1 PG (ref 26–33)
MCHC RBC AUTO-ENTMCNC: 32.7 GM/DL (ref 32.2–35.5)
MCV RBC AUTO: 91.8 FL (ref 80–94)
MONOCYTES # BLD: 7.3 %
MONOCYTES ABSOLUTE: 0.9 THOU/MM3 (ref 0.4–1.3)
NUCLEATED RED BLOOD CELLS: 0 /100 WBC
PLATELET # BLD: 226 THOU/MM3 (ref 130–400)
PLATELET ESTIMATE: ADEQUATE
PMV BLD AUTO: 12 FL (ref 9.4–12.4)
POTASSIUM REFLEX MAGNESIUM: 4.5 MEQ/L (ref 3.5–5.2)
RBC # BLD: 4.29 MILL/MM3 (ref 4.7–6.1)
SCAN OF BLOOD SMEAR: NORMAL
SEG NEUTROPHILS: 65 %
SEGMENTED NEUTROPHILS ABSOLUTE COUNT: 8.2 THOU/MM3 (ref 1.8–7.7)
SODIUM BLD-SCNC: 137 MEQ/L (ref 135–145)
T4 FREE: 1.3 NG/DL (ref 0.93–1.76)
TOTAL IRON BINDING CAPACITY: 215 UG/DL (ref 171–450)
TOTAL PROTEIN: 5.3 G/DL (ref 6.1–8)
TSH SERPL DL<=0.05 MIU/L-ACNC: 0.39 UIU/ML (ref 0.4–4.2)
WBC # BLD: 12.6 THOU/MM3 (ref 4.8–10.8)

## 2020-11-23 PROCEDURE — 83516 IMMUNOASSAY NONANTIBODY: CPT

## 2020-11-23 PROCEDURE — 80053 COMPREHEN METABOLIC PANEL: CPT

## 2020-11-23 PROCEDURE — 86038 ANTINUCLEAR ANTIBODIES: CPT

## 2020-11-23 PROCEDURE — 81256 HFE GENE: CPT

## 2020-11-23 PROCEDURE — 84443 ASSAY THYROID STIM HORMONE: CPT

## 2020-11-23 PROCEDURE — 87350 HEPATITIS BE AG IA: CPT

## 2020-11-23 PROCEDURE — 85025 COMPLETE CBC W/AUTO DIFF WBC: CPT

## 2020-11-23 PROCEDURE — 6370000000 HC RX 637 (ALT 250 FOR IP): Performed by: INTERNAL MEDICINE

## 2020-11-23 PROCEDURE — 86803 HEPATITIS C AB TEST: CPT

## 2020-11-23 PROCEDURE — 84439 ASSAY OF FREE THYROXINE: CPT

## 2020-11-23 PROCEDURE — 87517 HEPATITIS B DNA QUANT: CPT

## 2020-11-23 PROCEDURE — 2580000003 HC RX 258: Performed by: NURSE PRACTITIONER

## 2020-11-23 PROCEDURE — 6370000000 HC RX 637 (ALT 250 FOR IP): Performed by: STUDENT IN AN ORGANIZED HEALTH CARE EDUCATION/TRAINING PROGRAM

## 2020-11-23 PROCEDURE — 83540 ASSAY OF IRON: CPT

## 2020-11-23 PROCEDURE — 99239 HOSP IP/OBS DSCHRG MGMT >30: CPT | Performed by: INTERNAL MEDICINE

## 2020-11-23 PROCEDURE — 87340 HEPATITIS B SURFACE AG IA: CPT

## 2020-11-23 PROCEDURE — 6360000002 HC RX W HCPCS: Performed by: STUDENT IN AN ORGANIZED HEALTH CARE EDUCATION/TRAINING PROGRAM

## 2020-11-23 PROCEDURE — 86706 HEP B SURFACE ANTIBODY: CPT

## 2020-11-23 PROCEDURE — 82728 ASSAY OF FERRITIN: CPT

## 2020-11-23 PROCEDURE — 85610 PROTHROMBIN TIME: CPT

## 2020-11-23 PROCEDURE — 6370000000 HC RX 637 (ALT 250 FOR IP): Performed by: NURSE PRACTITIONER

## 2020-11-23 PROCEDURE — 85379 FIBRIN DEGRADATION QUANT: CPT

## 2020-11-23 PROCEDURE — 36415 COLL VENOUS BLD VENIPUNCTURE: CPT

## 2020-11-23 PROCEDURE — 83550 IRON BINDING TEST: CPT

## 2020-11-23 RX ORDER — FAMOTIDINE 20 MG/1
20 TABLET, FILM COATED ORAL 2 TIMES DAILY
Qty: 60 TABLET | Refills: 3 | COMMUNITY
Start: 2020-11-23 | End: 2021-03-03

## 2020-11-23 RX ORDER — CHOLECALCIFEROL (VITAMIN D3) 50 MCG
2000 TABLET ORAL DAILY
Qty: 60 TABLET | COMMUNITY
Start: 2020-11-24

## 2020-11-23 RX ORDER — GUAIFENESIN/DEXTROMETHORPHAN 100-10MG/5
5 SYRUP ORAL EVERY 4 HOURS PRN
Qty: 120 ML | COMMUNITY
Start: 2020-11-23 | End: 2020-12-03

## 2020-11-23 RX ORDER — ZINC SULFATE 50(220)MG
50 CAPSULE ORAL DAILY
Qty: 30 CAPSULE | Refills: 3 | COMMUNITY
Start: 2020-11-24

## 2020-11-23 RX ORDER — DEXAMETHASONE 6 MG/1
6 TABLET ORAL DAILY
Qty: 5 TABLET | Refills: 0 | Status: SHIPPED | OUTPATIENT
Start: 2020-11-24 | End: 2020-11-29

## 2020-11-23 RX ADMIN — ASPIRIN 81 MG: 81 TABLET, COATED ORAL at 08:44

## 2020-11-23 RX ADMIN — DEXAMETHASONE 6 MG: 4 TABLET ORAL at 08:44

## 2020-11-23 RX ADMIN — TAMSULOSIN HYDROCHLORIDE 0.4 MG: 0.4 CAPSULE ORAL at 08:44

## 2020-11-23 RX ADMIN — Medication 2000 UNITS: at 08:44

## 2020-11-23 RX ADMIN — FAMOTIDINE 20 MG: 20 TABLET, FILM COATED ORAL at 08:44

## 2020-11-23 RX ADMIN — OXYCODONE HYDROCHLORIDE AND ACETAMINOPHEN 1000 MG: 500 TABLET ORAL at 08:44

## 2020-11-23 RX ADMIN — SODIUM CHLORIDE: 9 INJECTION, SOLUTION INTRAVENOUS at 01:53

## 2020-11-23 RX ADMIN — Medication 50 MG: at 08:44

## 2020-11-23 RX ADMIN — ENOXAPARIN SODIUM 30 MG: 30 INJECTION SUBCUTANEOUS at 08:47

## 2020-11-23 NOTE — DISCHARGE SUMMARY
Hospital Medicine Discharge Summary      Patient Identification:   Shaheen Carpenter   : 1950  MRN: [id number]   Account: [de-identified]      Patient's PCP: Chelsi Perdue MD    Admit Date: 2020     Discharge Date: 2020    Admitting Physician: Shayan Cook MD     Discharge Physician: Francisco Klinefelter, MD     Discharge Diagnoses: Active Hospital Problems    Diagnosis Date Noted    Abnormal level of serum enzyme [R74.9] 2020    COVID-19 [U07.1] 2020       The patient was seen and examined on day of discharge and this discharge summary is in conjunction with any daily progress note from day of discharge. Hospital Course:   Shaheen Carpenter is a 69 y.o. male admitted to [facility] on 2020 for covid 19. The pt began having sxs of myalgias, body aches, fatigue on . He tested positive for covid on  and also tested positive for flu. He was admitted and treated for both with tamiflu and dexamethasone. He also received convalescent plasma. He was noted to have elevated lfts and therefore remdesivir was not used. He was followed by GI for this. They were improving by discharge and may have been due to viral infection. He initially needed oxygen but was on room air at discharge. He was clinically stable at discharge and The patient will follow up with pcp and consultants as appropriate.           Exam:     Vitals:  Vitals:    20 2026 20 2211 20 0332 20 0830   BP: 136/77  (!) 149/88 (!) 160/90   Pulse: 61  57    Resp: 16 18 16 (!) 54   Temp: 97.8 °F (36.6 °C)  98.5 °F (36.9 °C) 96.6 °F (35.9 °C)   TempSrc: Oral  Oral Oral   SpO2: 93% 94% 91% 91%   Weight:       Height:         Weight: Weight: 175 lb 6.4 oz (79.6 kg)     24 hour intake/output:    Intake/Output Summary (Last 24 hours) at 2020 1343  Last data filed at 2020 0421  Gross per 24 hour   Intake 1690.06 ml   Output 1425 ml   Net 265.06 ml General appearance:  No apparent distress, appears stated age and cooperative. HEENT:  Normal cephalic, atraumatic without obvious deformity. Pupils equal, round, and reactive to light. Extra ocular muscles intact. Conjunctivae/corneas clear. Neck: Supple, with full range of motion. No jugular venous distention. Trachea midline. Respiratory:  Normal respiratory effort. Clear to auscultation, bilaterally without Rales/Wheezes/Rhonchi. Cardiovascular:  Regular rate and rhythm with normal S1/S2 without murmurs, rubs or gallops. Abdomen: Soft, non-tender, non-distended with normal bowel sounds. Musculoskeletal:  No clubbing, cyanosis or edema bilaterally. Full range of motion without deformity. Skin: Skin color, texture, turgor normal.  No rashes or lesions. Neurologic:  Neurovascularly intact without any focal sensory/motor deficits. Cranial nerves: II-XII intact, grossly non-focal.  Psychiatric:  Alert and oriented, thought content appropriate, normal insight  Capillary Refill: Brisk,< 3 seconds   Peripheral Pulses: +2 palpable, equal bilaterally       Labs: For convenience and continuity at follow-up the following most recent labs are provided:      CBC:    Lab Results   Component Value Date    WBC 12.6 11/23/2020    HGB 12.9 11/23/2020    HCT 39.4 11/23/2020     11/23/2020       Renal:    Lab Results   Component Value Date     11/23/2020    K 4.5 11/23/2020     11/23/2020    CO2 25 11/23/2020    BUN 30 11/23/2020    CREATININE 0.9 11/23/2020    CALCIUM 9.2 11/23/2020         Significant Diagnostic Studies    Radiology:   US LIVER   Final Result   1. No sonographic finding for cholelithiasis or acute cholecystitis. 2.  Nonspecific round hyperechoic area in the right hepatic lobe measuring    2.3 x 2 x 2 cm may represent a hemangioma. This may be confirmed with    multiphasic CT or MRI if clinically indicated.       This document has been electronically signed by: Sharon Dale

## 2020-11-23 NOTE — TELEPHONE ENCOUNTER
.Transition of Care visit scheduled. 12/02/2020  Patient is being discharged to home  Date of discharge 11/23/2020  Discharge from facility [facility]  Reason for admission COVID19 and Influenza A      ** nurse from [facility] called to schedule a f/u appt for the pt for 7-10 days. After scheduling I noticed that someone else scheduled a f/u appt for tomorrow's date 11/24/2020 as well? Please advise if one of those needs to be cancelled out.

## 2020-11-23 NOTE — PROGRESS NOTES
Rolly Coats 59  PHYSICAL THERAPY MISSED TREATMENT NOTE  [facility] ONC MED 5K    Date: 2020  Patient Name: Kaelyn Montgomery        MRN: [id number]   : 1950  (69 y.o.)  Gender: male                REASON FOR MISSED TREATMENT:  Pt being discharged home today and per RN pt is moving ok. No PT eval necessary at this time. Myna Dull.  Brianna Mcmahan [place] 8

## 2020-11-23 NOTE — CARE COORDINATION
Patient is discharged to home today with no services added/requested. [facility] Notified of his discharge, spoke to [address]. Copy of Medicare Rights delivered, Gallardo in agreement for discharge today, his daughter is here to transport him home. 11/23/20, 1:07 PM EST    Patient goals/plan/ treatment preferences discussed by  and . Patient goals/plan/ treatment preferences reviewed with patient/ family. Patient/ family verbalize understanding of discharge plan and are in agreement with goal/plan/treatment preferences. Understanding was demonstrated using the teach back method. AVS provided by RN at time of discharge, which includes all necessary medical information pertaining to the patients current course of illness, treatment, post-discharge goals of care, and treatment preferences. IMM Letter  IMM Letter given to Patient/Family/Significant other/Guardian/POA/by[de-identified] Copy delivered to patient by Mgr. Huerta  IMM Letter date given[de-identified] 11/23/20  IMM Letter time given[de-identified] [id number]

## 2020-11-23 NOTE — PROGRESS NOTES
Daughter here to  pt taken downstairs by transport via w/c. Daughter will  pt's RXs from [facility] here before leaving for home.

## 2020-11-23 NOTE — PROGRESS NOTES
Pt Name: Claudia Owens  MRN: [id number]  [id number]  YOB: 1950  Admit Date: 11/17/2020 12:03 PM  Date of evaluation: 11/23/2020  Primary Care Physician: Sandra Bradford MD   [id number]   Dictating for Dr Mitch Francois denies abd pain, nausea, vomiting. Discussed avoiding alcohol and need for follow up to ensure resolution of ^ liver enzymes. O  BP (!) 160/90   Pulse 57   Temp 96.6 °F (35.9 °C) (Oral)   Resp (!) 54   Ht 5' 10\" (1.778 m)   Wt 175 lb 6.4 oz (79.6 kg)   SpO2 91%   BMI 25.17 kg/m²   VSS, afebrile  Alert and oriented  Resp: Rhonci in bases bilaterally  Chest: RRR   Abd: soft, non-tender, non-distended with active BS'S  Ext: no edema    Results for Cherre Sedona \"FE\" (MRN [id number]) as of 11/23/2020 10:19   Ref. Range 11/23/2020 04:55   Albumin Latest Ref Range: 3.5 - 5.1 g/dL 3.3 (L)   Alk Phos Latest Ref Range: 38 - 126 U/L 109   ALT Latest Ref Range: 11 - 66 U/L 366 (H)   AST Latest Ref Range: 5 - 40 U/L 66 (H)   Bilirubin Latest Ref Range: 0.3 - 1.2 mg/dL 0.3   Bilirubin, Direct Latest Ref Range: 0.0 - 0.3 mg/dL <0.2   Total Protein Latest Ref Range: 6.1 - 8.0 g/dL 5.3 (L)       Results for KIRAN, PENNY E \"FE\" (MRN [id number]) as of 11/23/2020 09:56   Ref. Range 11/23/2020 04:54   Hep B S Ab Unknown POSITIVE   Hepatitis B Surface Ag Unknown Negative   Hepatitis C Ab Unknown Negative       US abdomen limited 11-         Comparison: None         Findings:    The pancreas is obscured by bowel gas. The aorta and inferior vena cava are normal caliber.         The liver measures 16.6 cm in length. Round hyperechoic area in the right hepatic lobe adjacent to the    gallbladder fossa measures 2.3 x 2 x 2 cm without internal color flow. There is no intrahepatic bile duct dilatation.         The common duct is 5 mm in diameter. The gallbladder is normal. There is no sonographic Rockwell sign.     The main portal vein is antegrade.         Visualized portion of the right kidney is unremarkable without    hydronephrosis. No ascites              Impression    1.  No sonographic finding for cholelithiasis or acute cholecystitis. 2.  Nonspecific round hyperechoic area in the right hepatic lobe measuring    2.3 x 2 x 2 cm may represent a hemangioma.  This may be confirmed with    multiphasic CT or MRI if clinically indicated.         This document has been electronically signed by: Kinsey Villanueva MD on    11/20/2020 02:59 AM          Assessment  1. Elevated liver transaminases, AST 66 (204),  (468), improving slowly. 2. Recent influenza and COVID co-infection, s/p Tamiflu  3. History of psoriasis  4. HLD  5. History of hepatitis B in the past 30 years ago, +Hep B Ab, negative Hep B Ag  6. Had colonoscopy four years ago. 7. Liver hemangioma suspected on U/S  8. Anemia, normocytic. Hgb 12.9       Plan  1. Follow hepatopathy labs as outpatient as most are pending still  2. HFE testing today due to iron sat ^72% and ferritin 936. Ferritin may be elevated due to being acute phase reactant. 3. Elevated liver enzymes may be from medication versus viral infection. Awaiting hepatopathy labs as well. 4. Lipitor currently held  5. Will sign off, plan is for pt to be discharged today.   Will send lab slip to check HFP as outpatient in 5 days and to follow up in 1 to 2 weeks with Dr Anup Caceres CNP      Assessment and plan discussed with Dr. Salud Carr, PRAMOD, CNP, 11/23/2020, 9:53 AM  .

## 2020-11-24 ENCOUNTER — CARE COORDINATION (OUTPATIENT)
Dept: CASE MANAGEMENT | Age: 70
End: 2020-11-24

## 2020-11-24 NOTE — CARE COORDINATION
[address] Transitions Initial Follow Up Call    Call within 2 business days of discharge: Yes    Patient: Claudia Owens Patient : 1950   MRN: [id number]  Reason for Admission: Abnormal level of serum enzyme   Discharge Date: 20 RARS: Readmission Risk Score: 16      Last Discharge [facility]       Complaint Diagnosis Description Type Department Provider    20 Shortness of Breath COVID-19 virus infection . .. ED to Hosp-Admission (Discharged) (ADMITTED) DESI 5K Shamika Mesa MD; Mckenzie Contreras... Challenges to be reviewed by the provider   Additional needs identified to be addressed with provider Yes  none    Discussed COVID-19 related testing which was available at this time. Test results were positive. Patient informed of results, if available? Yes         Method of communication with provider : none    Advance Care Planning:   Does patient have an Advance Directive:  reviewed and current. Was this a readmission? No  Patient stated reason for admission: SOB  Patients top risk factors for readmission: medical condition    Care Transition Nurse (CTN) contacted the patient by telephone to perform post hospital discharge assessment. Verified name and  with patient as identifiers. Provided introduction to self, and explanation of the CTN role. CTN reviewed discharge instructions, medical action plan and red flags with patient who verbalized understanding. Patient given an opportunity to ask questions and does not have any further questions or concerns at this time. Were discharge instructions available to patient? Yes. Reviewed appropriate site of care based on symptoms and resources available to patient including: PCP. The patient agrees to contact the PCP office for questions related to their healthcare. Patient declined medication review. He is waiting for a Virtual appt with PCP and will review medications with him. Discussed follow-up appointments. Is follow up appointment scheduled within 7 days of discharge? Yes      Plan for follow-up call in 7-10 days based on severity of symptoms and risk factors. CTN provided contact information for future needs. Call what short. Patient states his SOB is better and he feels better. He still is weak. He is waiting on a virtual appt with his PCP.       Non-face-to-face services provided:  Obtained and reviewed discharge summary and/or continuity of care documents    Care Transitions 24 Hour Call    Do you have any ongoing symptoms?:  No  Do you have a copy of your discharge instructions?:  Yes  Do you have all of your prescriptions and are they filled?:  Yes  Have you been contacted by a [address]?:  No  Have you scheduled your follow up appointment?:  Yes  How are you going to get to your appointment?:  Other  Were you discharged with any Home Care or Post Acute Services:  No  Do you feel like you have everything you need to keep you well at home?:  Yes  Care Transitions Interventions  No Identified Needs         Follow Up  Future Appointments   Date Time Provider Adriel Deepika   12/2/2020 10:45 AM Dannielle Cottrell MD [address] [facility] [facility]   3/3/2021  7:45 AM Dannielle Cottrell MD [address] [facility]   10/8/2021  8:00 AM Louann Green Urology [facility] - Kike Jasmine RN

## 2020-11-25 LAB
ANA SCREEN: NORMAL
F-ACTIN AB IGG: 5 UNITS (ref 0–19)
HEPATITIS BE ANTIGEN: NEGATIVE
MITOCHONDRIAL ANTIBODY: 2.7 UNITS (ref 0–24.9)

## 2020-11-25 NOTE — PROGRESS NOTES
CLINICAL PHARMACY NOTE: MEDS TO [address] Select Patient?: Yes  Total # of Prescriptions Filled: 1   The following medications were delivered to the patient:  Dexamethasone 6mg  Total # of Interventions Completed: 2  Time Spent (min): 30    Additional Documentation:

## 2020-11-26 LAB
HBV DNA IU/ML: NOT DETECTED IU/ML
INTERPRETATION: NOT DETECTED
LOG 10 HBV AS IU/ML: NOT DETECTED LOG IU/ML

## 2020-11-28 LAB — MISC. #1 REFERENCE GROUP TEST: NORMAL

## 2020-11-30 ENCOUNTER — HOSPITAL ENCOUNTER (OUTPATIENT)
Age: 70
Discharge: HOME | End: 2020-11-30
Payer: MEDICARE

## 2020-11-30 DIAGNOSIS — R74.9 ABNORMAL LEVEL OF SERUM ENZYME: ICD-10-CM

## 2020-11-30 LAB
ALBUMIN SERPL-MCNC: 3.4 G/DL (ref 3.5–5.1)
ALP BLD-CCNC: 85 U/L (ref 38–126)
ALT SERPL-CCNC: 157 U/L (ref 11–66)
AST SERPL-CCNC: 20 U/L (ref 5–40)
BILIRUB SERPL-MCNC: 0.3 MG/DL (ref 0.3–1.2)
BILIRUBIN DIRECT: < 0.2 MG/DL (ref 0–0.3)
TOTAL PROTEIN: 5.6 G/DL (ref 6.1–8)

## 2020-11-30 PROCEDURE — 80076 HEPATIC FUNCTION PANEL: CPT

## 2020-11-30 PROCEDURE — 36415 COLL VENOUS BLD VENIPUNCTURE: CPT

## 2020-12-02 ENCOUNTER — VIRTUAL VISIT (OUTPATIENT)
Dept: FAMILY MEDICINE CLINIC | Age: 70
End: 2020-12-02
Payer: MEDICARE

## 2020-12-02 PROCEDURE — 99495 TRANSJ CARE MGMT MOD F2F 14D: CPT | Performed by: FAMILY MEDICINE

## 2020-12-02 PROCEDURE — 1111F DSCHRG MED/CURRENT MED MERGE: CPT | Performed by: FAMILY MEDICINE

## 2020-12-02 ASSESSMENT — ENCOUNTER SYMPTOMS
WHEEZING: 0
COUGH: 1
ABDOMINAL PAIN: 0
SHORTNESS OF BREATH: 0
DIARRHEA: 0

## 2020-12-02 NOTE — PROGRESS NOTES
Post-Discharge Transitional Care Management Services or Hospital Follow Up    TELEHEALTH EVALUATION -- Audio/Visual (During VQLUN-08 public health emergency)      Haily Heredia   YOB: 1950    Date of Office Visit:  12/2/2020  Date of Hospital Admission: 11/17/20  Date of Hospital Discharge: 11/23/20  Readmission Risk Score(high >=14%. Medium >=10%):Readmission Risk Score: 16      Care management risk score Rising risk (score 2-5) and Complex Care (Scores >=6): 0     Non face to face  following discharge, date last encounter closed (first attempt may have been earlier): 11/24/2020 10:57 AM 11/24/2020 10:57 AM    Call initiated 2 business days of discharge: Yes     Patient Active Problem List   Diagnosis    Benign prostatic hyperplasia with nocturia    Mixed hyperlipidemia    Psoriasis    Pre-diabetes    COVID-19    Abnormal level of serum enzyme       Allergies   Allergen Reactions    Crestor [Rosuvastatin Calcium]     Benadryl [Diphenhydramine] Other (See Comments)     Jitter, hyperactivity if takes more than one day        Medications listed as ordered at the time of discharge from hospital   AnastasiaDouglas Cindi Gallardo E \"Mack\"   Home Medication Instructions EDILSON:    Printed on:12/02/20 6672   Medication Information                      aspirin EC 81 MG EC tablet  Take 81 mg by mouth daily             famotidine (PEPCID) 20 MG tablet  Take 1 tablet by mouth 2 times daily             guaiFENesin-dextromethorphan (ROBITUSSIN DM) 100-10 MG/5ML syrup  Take 5 mLs by mouth every 4 hours as needed for Cough             ketoconazole (NIZORAL) 2 % shampoo  Apply topically daily as needed for Itching Apply topically daily as needed.              Multiple Vitamins-Minerals (MULTIVITAMIN-MINERALS) TABS tablet  Take 1 tablet by mouth daily             tamsulosin (FLOMAX) 0.4 MG capsule  Take 1 capsule by mouth daily             triamcinolone (KENALOG) 0.025 % cream  Apply topically every 4 hours as needed Apply Topically             vitamin C (VITAMIN C) 1000 MG tablet  Take 1 tablet by mouth daily             Vitamin D (CHOLECALCIFEROL) 50 MCG (2000 UT) TABS tablet  Take 1 tablet by mouth daily             zinc sulfate (ZINCATE) 220 (50 Zn) MG capsule  Take 1 capsule by mouth daily                   Medications marked \"taking\" at this time  Outpatient Medications Marked as Taking for the 12/2/20 encounter (Virtual Visit) with Savannah Blanca MD   Medication Sig Dispense Refill    guaiFENesin-dextromethorphan (ROBITUSSIN DM) 100-10 MG/5ML syrup Take 5 mLs by mouth every 4 hours as needed for Cough 120 mL     zinc sulfate (ZINCATE) 220 (50 Zn) MG capsule Take 1 capsule by mouth daily 30 capsule 3    famotidine (PEPCID) 20 MG tablet Take 1 tablet by mouth 2 times daily 60 tablet 3    vitamin C (VITAMIN C) 1000 MG tablet Take 1 tablet by mouth daily 30 tablet 3    Vitamin D (CHOLECALCIFEROL) 50 MCG (2000 UT) TABS tablet Take 1 tablet by mouth daily 60 tablet     tamsulosin (FLOMAX) 0.4 MG capsule Take 1 capsule by mouth daily 90 capsule 3    ketoconazole (NIZORAL) 2 % shampoo Apply topically daily as needed for Itching Apply topically daily as needed.  Multiple Vitamins-Minerals (MULTIVITAMIN-MINERALS) TABS tablet Take 1 tablet by mouth daily      aspirin EC 81 MG EC tablet Take 81 mg by mouth daily      triamcinolone (KENALOG) 0.025 % cream Apply topically every 4 hours as needed Apply Topically          Medications patient taking as of now reconciled against medications ordered at time of hospital discharge: Yes    Chief Complaint   Patient presents with    Follow-Up from Hospital       HPI    Inpatient course: Discharge summary reviewed- see chart. Interval history/Current status: positive covid and influenza. Still very tired. Has fatigue, low energy, and voice is sore/low. Coughing more the past 2 days. Using robitussin. On room air. Use vaporizer at times.   Using breathing toys from hospital. He is walking around the house. He stops once when climbing a flight of steps. Does not have home health. off lipitor due to elevated LFTs. Has f/u with Dr. Erin Yang for this. Review of Systems   Constitutional: Positive for fatigue. Negative for chills and fever. Respiratory: Positive for cough. Negative for shortness of breath and wheezing. Cardiovascular: Negative for chest pain and palpitations. Gastrointestinal: Negative for abdominal pain and diarrhea. There were no vitals filed for this visit. There is no height or weight on file to calculate BMI. Wt Readings from Last 3 Encounters:   11/22/20 175 lb 6.4 oz (79.6 kg)   10/09/20 183 lb (83 kg)   09/02/20 181 lb 3.2 oz (82.2 kg)     BP Readings from Last 3 Encounters:   11/23/20 (!) 160/90   09/02/20 126/88   08/16/20 (!) 164/90       Physical Exam      Lab Results   Component Value Date    WBC 12.6 (H) 11/23/2020    HGB 12.9 (L) 11/23/2020    HCT 39.4 (L) 11/23/2020     11/23/2020    CHOL 209 (H) 09/02/2020    TRIG 264 (H) 09/02/2020    HDL 35 09/02/2020     (H) 11/30/2020    AST 20 11/30/2020     11/23/2020    K 4.5 11/23/2020     11/23/2020    CREATININE 0.9 11/23/2020    BUN 30 (H) 11/23/2020    CO2 25 11/23/2020    TSH 0.390 (L) 11/23/2020    PSA 3.11 (H) 09/02/2020    INR 0.96 11/23/2020    LABA1C 6.4 09/02/2020       Assessment/Plan:  1. COVID-19  S/p admission for covid and influenza. Improving. Still very tired. Reassurance provided. Gradually increase activities as tolerated. He wants to hold on PT.   - MO DISCHARGE MEDS RECONCILED W/ CURRENT OUTPATIENT MED LIST    2. Influenza A  As above  - MO DISCHARGE MEDS RECONCILED W/ CURRENT OUTPATIENT MED LIST    3. Elevated LFTs  F/u with GI as scheduled        Medical Decision Making: moderate complexity    F/u prn      Claudia Owens is a 69 y.o. male being evaluated by a Virtual Visit (video visit) encounter to address concerns as mentioned above. A caregiver was present when appropriate. Due to this being a TeleHealth encounter (During EHZJY-45 public health emergency), evaluation of the following organ systems was limited: Vitals/Constitutional/EENT/Resp/CV/GI//MS/Neuro/Skin/Heme-Lymph-Imm. Pursuant to the emergency declaration under the [address] [place] [address] and the [employer] Act, this Virtual Visit was conducted with patient's (and/or legal guardian's) consent, to reduce the patient's risk of exposure to COVID-19 and provide necessary medical care. The patient (and/or legal guardian) has also been advised to contact this office for worsening conditions or problems, and seek emergency medical treatment and/or call 911 if deemed necessary. Patient identification was verified at the start of the visit: Yes    Total time spent on this encounter: Not billed by time    Services were provided through a video synchronous discussion virtually to substitute for in-person clinic visit. Patient and provider were located at their individual homes.     Julio Cesar Goldstein MD

## 2020-12-02 NOTE — PROGRESS NOTES
2020    TELEHEALTH EVALUATION -- Audio/Visual (During FPNCX-33 public health emergency)    HPI:    Kaelyn Montgomery (:  1950) has requested an audio/video evaluation for the following concern(s):    ***    Review of Systems    Prior to Visit Medications    Medication Sig Taking? Authorizing Provider   guaiFENesin-dextromethorphan (ROBITUSSIN DM) 100-10 MG/5ML syrup Take 5 mLs by mouth every 4 hours as needed for Cough Yes Kitty Mcgowan MD   zinc sulfate (ZINCATE) 220 (50 Zn) MG capsule Take 1 capsule by mouth daily Yes Kitty Mcgowan MD   famotidine (PEPCID) 20 MG tablet Take 1 tablet by mouth 2 times daily Yes Kitty Mcgowan MD   vitamin C (VITAMIN C) 1000 MG tablet Take 1 tablet by mouth daily Yes Kitty Mcgowan MD   Vitamin D (CHOLECALCIFEROL) 50 MCG (2000 UT) TABS tablet Take 1 tablet by mouth daily Yes Kitty Mcgowan MD   tamsulosin (FLOMAX) 0.4 MG capsule Take 1 capsule by mouth daily Yes PRAMOD Cash - CNP   ketoconazole (NIZORAL) 2 % shampoo Apply topically daily as needed for Itching Apply topically daily as needed.  Yes Historical Provider, MD   Multiple Vitamins-Minerals (MULTIVITAMIN-MINERALS) TABS tablet Take 1 tablet by mouth daily Yes Historical Provider, MD   aspirin EC 81 MG EC tablet Take 81 mg by mouth daily Yes Historical Provider, MD   triamcinolone (KENALOG) 0.025 % cream Apply topically every 4 hours as needed Apply Topically Yes Historical Provider, MD       Social History     Tobacco Use    Smoking status: Never Smoker    Smokeless tobacco: Never Used   Substance Use Topics    Alcohol use: No    Drug use: No        {F2 for Optional History SmartBlocks:63084}    PHYSICAL EXAMINATION:  [ INSTRUCTIONS:  \"[x]\" Indicates a positive item  \"[]\" Indicates a negative item  -- DELETE ALL ITEMS NOT EXAMINED]  Vital Signs: (As obtained by patient/caregiver or practitioner observation)    Blood pressure-  Heart rate-    Respiratory rate-    Temperature-  Pulse oximetry- Constitutional: [] Appears well-developed and well-nourished [] No apparent distress      [] Abnormal-   Mental status  [] Alert and awake  [] Oriented to person/place/time []Able to follow commands      Eyes:  EOM    []  Normal  [] Abnormal-  Sclera  []  Normal  [] Abnormal -         Discharge []  None visible  [] Abnormal -    HENT:   [] Normocephalic, atraumatic. [] Abnormal   [] Mouth/Throat: Mucous membranes are moist.     External Ears [] Normal  [] Abnormal-     Neck: [] No visualized mass     Pulmonary/Chest: [] Respiratory effort normal.  [] No visualized signs of difficulty breathing or respiratory distress        [] Abnormal-      Musculoskeletal:   [] Normal gait with no signs of ataxia         [] Normal range of motion of neck        [] Abnormal-       Neurological:        [] No Facial Asymmetry (Cranial nerve 7 motor function) (limited exam to video visit)          [] No gaze palsy        [] Abnormal-         Skin:        [] No significant exanthematous lesions or discoloration noted on facial skin         [] Abnormal-            Psychiatric:       [] Normal Affect [] No Hallucinations        [] Abnormal-     Other pertinent observable physical exam findings-     ASSESSMENT/PLAN:  1. COVID-19  ***  - [place] DISCHARGE MEDS RECONCILED W/ CURRENT OUTPATIENT MED LIST    2. Influenza A  ***  - [place] DISCHARGE MEDS RECONCILED W/ CURRENT OUTPATIENT MED LIST    3. Elevated LFTs  ***      Return if symptoms worsen or fail to improve. Dante Riggins is a 69 y.o. male being evaluated by a Virtual Visit (video visit) encounter to address concerns as mentioned above. A caregiver was present when appropriate. Due to this being a TeleHealth encounter (During JOATC-11 public health emergency), evaluation of the following organ systems was limited: Vitals/Constitutional/EENT/Resp/CV/GI//MS/Neuro/Skin/Heme-Lymph-Imm.   Pursuant to the emergency declaration under the [facility] [place], [address]

## 2020-12-29 ENCOUNTER — PATIENT MESSAGE (OUTPATIENT)
Dept: FAMILY MEDICINE CLINIC | Age: 70
End: 2020-12-29

## 2020-12-29 NOTE — TELEPHONE ENCOUNTER
From: Sami Garces  To: Cory Oliva MD  Sent: 12/29/2020 10:19 AM EST  Subject: Non-Urgent Medical Question    My wife, Edson Saul, born 12/13/1949, has been complaining of her hip hurting at times. This past summer she joined her grandkids on a trampoline and bounced a few times before getting off. She blames the time on the trampoline for her discomfort. It's not a continual complaint but wants to see a doctor. I am more concerned about her recent bathroom visits. Her insistence on using pads and toilet paper or Kleenex for padding to keep herself dry when no evidence for that problem. She also has been using either a comb or dry/ wet towel to rub her chin sometimes until it starts to become dry and red. Some maybe due to her Alzheimer's. I know she has an appointment in March but if possible would like to get an appointment sooner.

## 2021-01-12 ENCOUNTER — HOSPITAL ENCOUNTER (OUTPATIENT)
Dept: CT IMAGING | Age: 71
Discharge: HOME | End: 2021-01-12
Payer: MEDICARE

## 2021-01-12 ENCOUNTER — HOSPITAL ENCOUNTER (OUTPATIENT)
Age: 71
Discharge: HOME | End: 2021-01-12
Payer: MEDICARE

## 2021-01-12 DIAGNOSIS — K76.9 LIVER LESION: ICD-10-CM

## 2021-01-12 LAB
ALBUMIN SERPL-MCNC: 4.6 G/DL (ref 3.5–5.1)
ALP BLD-CCNC: 95 U/L (ref 38–126)
ALT SERPL-CCNC: 22 U/L (ref 11–66)
AST SERPL-CCNC: 20 U/L (ref 5–40)
BILIRUB SERPL-MCNC: 0.4 MG/DL (ref 0.3–1.2)
BILIRUBIN DIRECT: < 0.2 MG/DL (ref 0–0.3)
ERYTHROCYTE [DISTWIDTH] IN BLOOD BY AUTOMATED COUNT: 13.7 % (ref 11.5–14.5)
ERYTHROCYTE [DISTWIDTH] IN BLOOD BY AUTOMATED COUNT: 46.8 FL (ref 35–45)
FERRITIN: 224 NG/ML (ref 22–322)
HCT VFR BLD CALC: 41.1 % (ref 42–52)
HEMOGLOBIN: 13.8 GM/DL (ref 14–18)
IRON: 113 UG/DL (ref 65–195)
MCH RBC QN AUTO: 31.4 PG (ref 26–33)
MCHC RBC AUTO-ENTMCNC: 33.6 GM/DL (ref 32.2–35.5)
MCV RBC AUTO: 93.4 FL (ref 80–94)
PLATELET # BLD: 155 THOU/MM3 (ref 130–400)
PMV BLD AUTO: 11.6 FL (ref 9.4–12.4)
POC CREATININE WHOLE BLOOD: 1 MG/DL (ref 0.5–1.2)
RBC # BLD: 4.4 MILL/MM3 (ref 4.7–6.1)
TOTAL IRON BINDING CAPACITY: 308 UG/DL (ref 171–450)
TOTAL PROTEIN: 7.2 G/DL (ref 6.1–8)
WBC # BLD: 4.9 THOU/MM3 (ref 4.8–10.8)

## 2021-01-12 PROCEDURE — 6360000004 HC RX CONTRAST MEDICATION: Performed by: NURSE PRACTITIONER

## 2021-01-12 PROCEDURE — 85027 COMPLETE CBC AUTOMATED: CPT

## 2021-01-12 PROCEDURE — 80076 HEPATIC FUNCTION PANEL: CPT

## 2021-01-12 PROCEDURE — 82728 ASSAY OF FERRITIN: CPT

## 2021-01-12 PROCEDURE — 82565 ASSAY OF CREATININE: CPT

## 2021-01-12 PROCEDURE — 83540 ASSAY OF IRON: CPT

## 2021-01-12 PROCEDURE — 74170 CT ABD WO CNTRST FLWD CNTRST: CPT

## 2021-01-12 PROCEDURE — 83550 IRON BINDING TEST: CPT

## 2021-01-12 PROCEDURE — 36415 COLL VENOUS BLD VENIPUNCTURE: CPT

## 2021-01-12 RX ADMIN — IOHEXOL 50 ML: 240 INJECTION, SOLUTION INTRATHECAL; INTRAVASCULAR; INTRAVENOUS; ORAL at 13:22

## 2021-01-12 RX ADMIN — IOPAMIDOL 85 ML: 755 INJECTION, SOLUTION INTRAVENOUS at 13:22

## 2021-03-03 ENCOUNTER — OFFICE VISIT (OUTPATIENT)
Dept: FAMILY MEDICINE CLINIC | Age: 71
End: 2021-03-03
Payer: MEDICARE

## 2021-03-03 VITALS
OXYGEN SATURATION: 99 % | BODY MASS INDEX: 26.26 KG/M2 | DIASTOLIC BLOOD PRESSURE: 86 MMHG | WEIGHT: 183.4 LBS | TEMPERATURE: 97.3 F | SYSTOLIC BLOOD PRESSURE: 138 MMHG | HEART RATE: 58 BPM | HEIGHT: 70 IN

## 2021-03-03 DIAGNOSIS — N40.1 BENIGN PROSTATIC HYPERPLASIA WITH NOCTURIA: ICD-10-CM

## 2021-03-03 DIAGNOSIS — R35.1 BENIGN PROSTATIC HYPERPLASIA WITH NOCTURIA: ICD-10-CM

## 2021-03-03 DIAGNOSIS — E78.2 MIXED HYPERLIPIDEMIA: Primary | ICD-10-CM

## 2021-03-03 PROCEDURE — 3017F COLORECTAL CA SCREEN DOC REV: CPT | Performed by: FAMILY MEDICINE

## 2021-03-03 PROCEDURE — 99212 OFFICE O/P EST SF 10 MIN: CPT | Performed by: FAMILY MEDICINE

## 2021-03-03 PROCEDURE — G8427 DOCREV CUR MEDS BY ELIG CLIN: HCPCS | Performed by: FAMILY MEDICINE

## 2021-03-03 PROCEDURE — 4040F PNEUMOC VAC/ADMIN/RCVD: CPT | Performed by: FAMILY MEDICINE

## 2021-03-03 PROCEDURE — 1123F ACP DISCUSS/DSCN MKR DOCD: CPT | Performed by: FAMILY MEDICINE

## 2021-03-03 PROCEDURE — 1036F TOBACCO NON-USER: CPT | Performed by: FAMILY MEDICINE

## 2021-03-03 PROCEDURE — G8417 CALC BMI ABV UP PARAM F/U: HCPCS | Performed by: FAMILY MEDICINE

## 2021-03-03 PROCEDURE — G8482 FLU IMMUNIZE ORDER/ADMIN: HCPCS | Performed by: FAMILY MEDICINE

## 2021-03-03 ASSESSMENT — PATIENT HEALTH QUESTIONNAIRE - PHQ9
SUM OF ALL RESPONSES TO PHQ9 QUESTIONS 1 & 2: 0
SUM OF ALL RESPONSES TO PHQ QUESTIONS 1-9: 0
2. FEELING DOWN, DEPRESSED OR HOPELESS: 0
SUM OF ALL RESPONSES TO PHQ QUESTIONS 1-9: 0
1. LITTLE INTEREST OR PLEASURE IN DOING THINGS: 0

## 2021-03-03 NOTE — PROGRESS NOTES
[facility]  [facility]  [facility]  [address] [facility]  Dept: [phone]  Dept Fax: [phone]  Loc: [phone]  PROGRESS NOTE      Visit Date: 3/3/2021    Rylan Baker is a 69 y.o. male who presents today for:  Chief Complaint   Patient presents with    6 Month Follow-Up    Hyperlipidemia       Subjective:  HPI      6-month follow-up:     Hyperlipidemia:  in September 2020. Diet and exercise controlled. He had been on Lipitor in 2020 due to elevated LFTs. BPH: On Flomax. Wakes up 1-2 times at night to urinate. He had an elevated PSA of 3.1 in September. He has not had his PSA rechecked since then. He takes care of his wife. Review of Systems   Constitutional: Negative for chills and fever. Genitourinary: Positive for difficulty urinating (sometimes). Negative for dysuria, frequency and urgency.      Patient Active Problem List   Diagnosis    Benign prostatic hyperplasia with nocturia    Mixed hyperlipidemia    Psoriasis    Pre-diabetes    COVID-19    Abnormal level of serum enzyme     Past Medical History:   Diagnosis Date    BPH (benign prostatic hyperplasia)     COVID-19 11/12/2020    Hepatitis B     Hyperlipidemia     Psoriasis       Past Surgical History:   Procedure Laterality Date    BACK SURGERY  2015    Herniated disk     MOUTH SURGERY  1968     Family History   Problem Relation Age of Onset    High Cholesterol Mother     Emphysema Father     Asthma Father     Cancer Sister     Cancer Brother      Social History     Tobacco Use    Smoking status: Never Smoker    Smokeless tobacco: Never Used   Substance Use Topics    Alcohol use: No      Current Outpatient Medications   Medication Sig Dispense Refill    zinc sulfate (ZINCATE) 220 (50 Zn) MG capsule Take 1 capsule by mouth daily 30 capsule 3    vitamin C (VITAMIN C) 1000 MG tablet Take 1 tablet by mouth daily 30 tablet 3    Vitamin D (CHOLECALCIFEROL) 50 MCG (2000 UT) TABS tablet Take 1 tablet by mouth daily 60 tablet     tamsulosin (FLOMAX) 0.4 MG capsule Take 1 capsule by mouth daily 90 capsule 3    ketoconazole (NIZORAL) 2 % shampoo Apply topically daily as needed for Itching Apply topically daily as needed.  Multiple Vitamins-Minerals (MULTIVITAMIN-MINERALS) TABS tablet Take 1 tablet by mouth daily      aspirin EC 81 MG EC tablet Take 81 mg by mouth daily      triamcinolone (KENALOG) 0.025 % cream Apply topically every 4 hours as needed Apply Topically      famotidine (PEPCID) 20 MG tablet Take 1 tablet by mouth 2 times daily (Patient not taking: Reported on 3/3/2021) 60 tablet 3     No current facility-administered medications for this visit. Allergies   Allergen Reactions    Crestor [Rosuvastatin Calcium]     Benadryl [Diphenhydramine] Other (See Comments)     Jitter, hyperactivity if takes more than one day      Health Maintenance   Topic Date Due    COVID-19 Vaccine (1 of 2) Never done    Shingles Vaccine (1 of 2) Never done    Colon Cancer Screen FIT/FOBT  03/13/2021    A1C test (Diabetic or Prediabetic)  09/02/2021    Annual Wellness Visit (AWV)  09/03/2021    Lipid screen  09/02/2025    DTaP/Tdap/Td vaccine (2 - Td) 09/03/2030    Flu vaccine  Completed    Pneumococcal 65+ years Vaccine  Completed    Hepatitis C screen  Completed    Hepatitis A vaccine  Aged Out    Hepatitis B vaccine  Aged Out    Hib vaccine  Aged Out    Meningococcal (ACWY) vaccine  Aged Out       Objective:  BP (!) 142/88 (Site: Left Upper Arm, Position: Sitting)   Pulse 58   Temp 97.3 °F (36.3 °C)   Ht 5' 10\" (1.778 m)   Wt 183 lb 6.4 oz (83.2 kg)   SpO2 99%   BMI 26.32 kg/m²   Physical Exam  Vitals signs reviewed. Constitutional:       Appearance: He is well-developed. Cardiovascular:      Rate and Rhythm: Normal rate and regular rhythm. Heart sounds: No murmur.    Pulmonary:      Effort: Pulmonary effort is normal. No respiratory distress. Breath sounds: Normal breath sounds. No wheezing or rhonchi. Musculoskeletal:      Right lower leg: No edema. Left lower leg: No edema. Neurological:      Mental Status: He is alert. Mental status is at baseline. Psychiatric:         Mood and Affect: Mood normal.         Behavior: Behavior normal.         Impression/Plan:  1. Mixed hyperlipidemia  Chronic. Mostly controlled on diet and exercise. Unable to take statins right now as he has a recent history of elevated LFTs. Follows with GI    2. Benign prostatic hyperplasia with nocturia  Chronic. Controlled. Follows with urology. Has had elevated PSA and gets it rechecked yearly by urology. Continue Flomax      Dr. George Holder is planning on colonoscopy this year. They voiced understanding. All questions answered. They agreed with treatment plan. See patient instructions for any educational materials that may have been given. Discussed use, benefit, and side effects of prescribed medications. Reviewed health maintenance. (Please note that portions of this note may have been completed with a voice recognition program.  Efforts were made to edit the dictation but occasionally words are mis-transcribed.)    Return in about 6 months (around 9/3/2021) for medicare wellness.       Electronically signed by Brando Su MD on 3/3/2021 at 7:51 AM

## 2021-05-20 ENCOUNTER — HOSPITAL ENCOUNTER (OUTPATIENT)
Age: 71
Discharge: HOME | End: 2021-05-20
Payer: MEDICARE

## 2021-05-20 LAB
BUN BLDV-MCNC: 26 MG/DL (ref 7–22)
CREAT SERPL-MCNC: 1.4 MG/DL (ref 0.4–1.2)
GFR SERPL CREATININE-BSD FRML MDRD: 50 ML/MIN/1.73M2

## 2021-05-20 PROCEDURE — 84520 ASSAY OF UREA NITROGEN: CPT

## 2021-05-20 PROCEDURE — 36415 COLL VENOUS BLD VENIPUNCTURE: CPT

## 2021-05-20 PROCEDURE — 82565 ASSAY OF CREATININE: CPT

## 2021-07-27 ENCOUNTER — HOSPITAL ENCOUNTER (OUTPATIENT)
Dept: CT IMAGING | Age: 71
Discharge: HOME | End: 2021-07-27
Payer: MEDICARE

## 2021-07-27 ENCOUNTER — HOSPITAL ENCOUNTER (OUTPATIENT)
Age: 71
Discharge: HOME | End: 2021-07-27
Payer: MEDICARE

## 2021-07-27 DIAGNOSIS — K76.89 LIVER CYST: ICD-10-CM

## 2021-07-27 LAB
BUN, WHOLE BLOOD: 24 MG/DL (ref 8–26)
CREAT SERPL-MCNC: 1.3 MG/DL (ref 0.5–1.2)
GFR, ESTIMATED: 58 ML/MIN/1.73M2

## 2021-07-27 PROCEDURE — 74178 CT ABD&PLV WO CNTR FLWD CNTR: CPT

## 2021-07-27 PROCEDURE — 84520 ASSAY OF UREA NITROGEN: CPT

## 2021-07-27 PROCEDURE — 82565 ASSAY OF CREATININE: CPT

## 2021-07-27 PROCEDURE — 6360000004 HC RX CONTRAST MEDICATION: Performed by: NURSE PRACTITIONER

## 2021-07-27 RX ADMIN — IOPAMIDOL 85 ML: 755 INJECTION, SOLUTION INTRAVENOUS at 13:45

## 2021-07-27 RX ADMIN — IOHEXOL 25 ML: 240 INJECTION, SOLUTION INTRATHECAL; INTRAVASCULAR; INTRAVENOUS; ORAL at 13:45

## 2021-08-24 ASSESSMENT — LIFESTYLE VARIABLES
AUDIT TOTAL SCORE: INCOMPLETE
AUDIT-C TOTAL SCORE: INCOMPLETE
HOW OFTEN DO YOU HAVE A DRINK CONTAINING ALCOHOL: NEVER
HOW OFTEN DO YOU HAVE A DRINK CONTAINING ALCOHOL: 0

## 2021-08-24 ASSESSMENT — PATIENT HEALTH QUESTIONNAIRE - PHQ9
SUM OF ALL RESPONSES TO PHQ QUESTIONS 1-9: 0
1. LITTLE INTEREST OR PLEASURE IN DOING THINGS: 0
2. FEELING DOWN, DEPRESSED OR HOPELESS: 0
SUM OF ALL RESPONSES TO PHQ QUESTIONS 1-9: 0
SUM OF ALL RESPONSES TO PHQ9 QUESTIONS 1 & 2: 0
SUM OF ALL RESPONSES TO PHQ QUESTIONS 1-9: 0

## 2021-09-07 ENCOUNTER — OFFICE VISIT (OUTPATIENT)
Dept: FAMILY MEDICINE CLINIC | Age: 71
End: 2021-09-07
Payer: MEDICARE

## 2021-09-07 VITALS
TEMPERATURE: 96.3 F | OXYGEN SATURATION: 97 % | SYSTOLIC BLOOD PRESSURE: 132 MMHG | HEIGHT: 70 IN | DIASTOLIC BLOOD PRESSURE: 62 MMHG | RESPIRATION RATE: 14 BRPM | HEART RATE: 70 BPM | WEIGHT: 185.4 LBS | BODY MASS INDEX: 26.54 KG/M2

## 2021-09-07 DIAGNOSIS — Z00.00 ROUTINE GENERAL MEDICAL EXAMINATION AT A HEALTH CARE FACILITY: Primary | ICD-10-CM

## 2021-09-07 DIAGNOSIS — R73.09 ELEVATED GLUCOSE: ICD-10-CM

## 2021-09-07 DIAGNOSIS — E78.2 MIXED HYPERLIPIDEMIA: ICD-10-CM

## 2021-09-07 DIAGNOSIS — R53.83 TIREDNESS: ICD-10-CM

## 2021-09-07 PROCEDURE — 4040F PNEUMOC VAC/ADMIN/RCVD: CPT | Performed by: FAMILY MEDICINE

## 2021-09-07 PROCEDURE — G0439 PPPS, SUBSEQ VISIT: HCPCS | Performed by: FAMILY MEDICINE

## 2021-09-07 PROCEDURE — 1123F ACP DISCUSS/DSCN MKR DOCD: CPT | Performed by: FAMILY MEDICINE

## 2021-09-07 PROCEDURE — 3017F COLORECTAL CA SCREEN DOC REV: CPT | Performed by: FAMILY MEDICINE

## 2021-09-07 SDOH — ECONOMIC STABILITY: FOOD INSECURITY: WITHIN THE PAST 12 MONTHS, THE FOOD YOU BOUGHT JUST DIDN'T LAST AND YOU DIDN'T HAVE MONEY TO GET MORE.: NEVER TRUE

## 2021-09-07 SDOH — ECONOMIC STABILITY: FOOD INSECURITY: WITHIN THE PAST 12 MONTHS, YOU WORRIED THAT YOUR FOOD WOULD RUN OUT BEFORE YOU GOT MONEY TO BUY MORE.: NEVER TRUE

## 2021-09-07 ASSESSMENT — SOCIAL DETERMINANTS OF HEALTH (SDOH): HOW HARD IS IT FOR YOU TO PAY FOR THE VERY BASICS LIKE FOOD, HOUSING, MEDICAL CARE, AND HEATING?: NOT HARD AT ALL

## 2021-09-07 NOTE — PATIENT INSTRUCTIONS
Personalized Preventive Plan for Vy Pacheco - 9/7/2021  Medicare offers a range of preventive health benefits. Some of the tests and screenings are paid in full while other may be subject to a deductible, co-insurance, and/or copay. Some of these benefits include a comprehensive review of your medical history including lifestyle, illnesses that may run in your family, and various assessments and screenings as appropriate. After reviewing your medical record and screening and assessments performed today your provider may have ordered immunizations, labs, imaging, and/or referrals for you. A list of these orders (if applicable) as well as your Preventive Care list are included within your After Visit Summary for your review. Other Preventive Recommendations:    · A preventive eye exam performed by an eye specialist is recommended every 1-2 years to screen for glaucoma; cataracts, macular degeneration, and other eye disorders. · A preventive dental visit is recommended every 6 months. · Try to get at least 150 minutes of exercise per week or 10,000 steps per day on a pedometer . · Order or download the FREE \"Exercise & Physical Activity: Your Everyday Guide\" from [employer]. Call 9-906.654.3441 or search [employer] online. · You need 0588-1145 mg of calcium and 5858-7934 IU of vitamin D per day. It is possible to meet your calcium requirement with diet alone, but a vitamin D supplement is usually necessary to meet this goal.  · When exposed to the sun, use a sunscreen that protects against both UVA and UVB radiation with an SPF of 30 or greater. Reapply every 2 to 3 hours or after sweating, drying off with a towel, or swimming. · Always wear a seat belt when traveling in a car. Always wear a helmet when riding a bicycle or motorcycle.     Keeping Home a [facility]       As we get older, changes in balance, gait, strength, vision, hearing, and cognition make even the most youthful senior more prone to accidents. Falls are one of the leading health risks for older people. This increased risk of falling is related to:   Aging process (eg, decreased muscle strength, slowed reflexes)   Higher incidence of chronic health problems (eg, arthritis, diabetes) that may limit mobility, agility or sensory awareness   Side effects of medicine (eg, dizziness, blurred vision)especially medicines like prescription pain medicines and drugs used to treat mental health conditions   Depending on the brittleness of your bones, the consequences of a fall can be serious and long lasting. Home Life   Research by the Association of Aging [facility]) shows that some home accidents among older adults can be prevented by making simple lifestyle changes and basic modifications and repairs to the home environment. Here are some lifestyle changes that experts recommend:   Have your hearing and vision checked regularly. Be sure to wear prescription glasses that are right for you. Speak to your doctor or pharmacist about the possible side effects of your medicines. A number of medicines can cause dizziness. If you have problems with sleep, talk to your doctor. Limit your intake of alcohol. If necessary, use a cane or walker to help maintain your balance. Wear supportive, rubber-soled shoes, even at home. If you live in a region that gets wintry weather, you may want to put special cleats on your shoes to prevent you from slipping on the snow and ice. Exercise regularly to help maintain muscle tone, agility, and balance. Always hold the banister when going up or down stairs. Also, use  bars when getting in or out of the bath or shower, or using the toilet. To avoid dizziness, get up slowly from a lying down position. Sit up first, dangling your legs for a minute or two before rising to a standing position.    Overall Home Safety Check   According to the Consumer Product Safety Commision's \"Older Consumer Home Safety Checklist,\" it is important to check for potential hazards in each room. And remember, proper lighting is an essential factor in home safety. If you cannot see clearly, you are more likely to fall. Important questions to ask yourself include:   Are lamp, electric, extension, and telephone cords placed out of the flow of traffic and maintained in good condition? Have frayed cords been replaced? Are all small rugs and runners slip resistant? If not, you can secure them to the floor with a special double-sided carpet tape. Are smoke detectors properly locatedone on every floor of your home and one outside of every sleeping area? Are they in good working order? Are batteries replaced at least once a year? Do you have a well-maintained carbon monoxide detector outside every sleeping are in your home? Does your furniture layout leave plenty of space to maneuver between and around chairs, tables, beds, and sofas? Are hallways, stairs and passages between rooms well lit? Can you reach a lamp without getting out of bed? Are floor surfaces well maintained? Shag rugs, high-pile carpeting, tile floors, and polished wood floors can be particularly slippery. Stairs should always have handrails and be carpeted or fitted with a non-skid tread. Is your telephone easily reachable. Is the cord safely tucked away? Room by Room   According to the Association of Aging, bathrooms and mer are the two most potentially hazardous rooms in your home. In the Kitchen    Be sure your stove is in proper working order and always make sure burners and the oven are off before you go out or go to sleep. Keep pots on the back burners, turn handles away from the front of the stove, and keep stove clean and free of grease build-up. Kitchen ventilation systems and range exhausts should be working properly.     Keep flammable objects such as towels and pot holders away from the cooking area except when in use. Make sure kitchen curtains are tied back. Move cords and appliances away from the sink and hot surfaces. If extension cords are needed, install wiring guides so they do not hang over the sink, range, or working areas. Look for coffee pots, kettles and toaster ovens with automatic shut-offs. Keep a mop handy in the kitchen so you can wipe up spills instantly. You should also have a small fire extinguisher. Arrange your kitchen with frequently used items on lower shelves to avoid the need to stand on a stepstool to reach them. Make sure countertops are well-lit to avoid injuries while cutting and preparing food. In the Bathroom    Use a non-slip mat or decals in the tub and shower, since wet, soapy tile or porcelain surfaces are extremely slippery. Make sure bathroom rugs are non-skid or tape them firmly to the floor. Bathtubs should have at least one, preferably two, grab bars, firmly attached to structural supports in the wall. (Do not use built-in soap holders or glass shower doors as grab bars.)    Tub seats fitted with non-slip material on the legs allow you to wash sitting down. For people with limited mobility, bathtub transfer benches allow you to slide safely into the tub. Raised toilet seats and toilet safety rails are helpful for those with knee or hip problems. In the [facility]    Make sure you use a nightlight and that the area around your bed is clear of potential obstacles. Be careful with electric blankets and never go to sleep with a heating pad, which can cause serious burns even if on a low setting. Use fire-resistant mattress covers and pillows, and NEVER smoke in bed. Keep a phone next to the bed that is programmed to dial 911 at the push of a button. If you have a chronic condition, you may want to sign on with an automatic call-in service.  Typically the system includes a small pendant that connects directly to an emergency medical voice-response system. You should also make arrangements to stay in contact with someonefriend, neighbor, family memberon a regular schedule. Fire Prevention   According to the [employer]. (Smoke Alarms for Every) [facility], senior citizens are one of the two highest risk groups for death and serious injuries due to residential fires. When cooking, wear short-sleeved items, never a bulky long-sleeved robe. The [facility]'s Safety Checklist for Older Consumers emphasizes the importance of checking basements, garages, workshops and storage areas for fire hazards, such as volatile liquids, piles of old rags or clothing and overloaded circuits. Never smoke in bed or when lying down on a couch or recliner chair. Small portable electric or kerosene heaters are responsible for many home fires and should be used cautiously if at all. If you do use one, be sure to keep them away from flammable materials. In case of fire, make sure you have a pre-established emergency exit plan. Have a professional check your fireplace and other fuel-burning appliances yearly. [employer]   Baby boomers entering the carson years will continue to see the development of new products to help older adults live safely and independently in spite of age-related changes. Making Life More Livable  , by [employer], lists over 1,000 products for \"living well in the mature years,\" such as bathing and mobility aids, household security devices, ergonomically designed knives and peelers, and faucet valves and knobs for temperature control. Medical supply stores and organizations are good sources of information about products that improve your quality of life and insure your safety.      Last Reviewed: November 2009 Pierre Marquez MD   Updated: 3/7/2011     ·

## 2021-09-07 NOTE — PROGRESS NOTES
Medicare Annual Wellness Visit  Name: Mohsen Deshpande Date: 2021   MRN: [id number] Sex: Male   Age: 66 y.o. Ethnicity: Non- / Non    : 1950 Race: White (non-)      Haily Heredia is here for Medicare AWV, 6 Month Follow-Up, and Hyperlipidemia    Screenings for behavioral, psychosocial and functional/safety risks, and cognitive dysfunction are all negative except as indicated below. These results, as well as other patient data from the [address] [address] [address] [facility], are documented in Flowsheets linked to this Encounter. Allergies   Allergen Reactions    Crestor [Rosuvastatin Calcium]     Benadryl [Diphenhydramine] Other (See Comments)     Jitter, hyperactivity if takes more than one day        Prior to Visit Medications    Medication Sig Taking? Authorizing Provider   zinc sulfate (ZINCATE) 220 (50 Zn) MG capsule Take 1 capsule by mouth daily Yes Nan Montaño MD   vitamin C (VITAMIN C) 1000 MG tablet Take 1 tablet by mouth daily Yes Nan Montaño MD   Vitamin D (CHOLECALCIFEROL) 50 MCG (2000 UT) TABS tablet Take 1 tablet by mouth daily Yes Nan Montaño MD   tamsulosin (FLOMAX) 0.4 MG capsule Take 1 capsule by mouth daily Yes PRAMOD Rosales CNP   ketoconazole (NIZORAL) 2 % shampoo Apply topically daily as needed for Itching Apply topically daily as needed.  Yes Historical Provider, MD   Multiple Vitamins-Minerals (MULTIVITAMIN-MINERALS) TABS tablet Take 1 tablet by mouth daily Yes Historical Provider, MD   aspirin EC 81 MG EC tablet Take 81 mg by mouth daily Yes Historical Provider, MD   triamcinolone (KENALOG) 0.025 % cream Apply topically every 4 hours as needed Apply Topically Yes Historical Provider, MD       Past Medical History:   Diagnosis Date    BPH (benign prostatic hyperplasia)     COVID-19 2020    Hepatitis B     Hyperlipidemia     Psoriasis        Past Surgical History:   Procedure Laterality Date    BACK SURGERY   Herniated disk     MOUTH SURGERY  1968       Family History   Problem Relation Age of Onset    High Cholesterol Mother     Emphysema Father     Asthma Father     Cancer Sister     Cancer Brother        CareTeam (Including outside providers/suppliers regularly involved in providing care):   Patient Care Team:  Ashtyn Doe MD as PCP - General (Family Medicine)  Ashtyn Doe MD as PCP - [facility] Guerita Ware Provider    Wt Readings from Last 3 Encounters:   09/07/21 185 lb 6.4 oz (84.1 kg)   03/03/21 183 lb 6.4 oz (83.2 kg)   11/22/20 175 lb 6.4 oz (79.6 kg)     Vitals:    09/07/21 0912   BP: 132/62   Site: Left Upper Arm   Position: Sitting   Pulse: 70   Resp: 14   Temp: 96.3 °F (35.7 °C)   SpO2: 97%   Weight: 185 lb 6.4 oz (84.1 kg)   Height: 5' 10\" (1.778 m)     Body mass index is 26.6 kg/m². Based upon direct observation of the patient, evaluation of cognition reveals recent and remote memory intact. Physical Exam  Vitals reviewed. Constitutional:       Appearance: He is well-developed. He is not ill-appearing. Cardiovascular:      Rate and Rhythm: Normal rate and regular rhythm. Heart sounds: No murmur heard. Pulmonary:      Effort: Pulmonary effort is normal. No respiratory distress. Breath sounds: Normal breath sounds. No wheezing or rhonchi. Musculoskeletal:      Right lower leg: No edema. Left lower leg: No edema. Neurological:      Mental Status: He is alert. Mental status is at baseline. Psychiatric:         Mood and Affect: Mood normal.         Behavior: Behavior normal.           Patient's complete Health Risk Assessment and screening values have been reviewed and are found in Flowsheets. The following problems were reviewed today and where indicated follow up appointments were made and/or referrals ordered.     Positive Risk Factor Screenings with Interventions:     Fall Risk:  2 or more falls in past year?: (!) yes  Fall with injury in past year?: no  Fall Risk Interventions:    · Home safety tips provided        General Health and ACP:  General  In general, how would you say your health is?: Good  In the past 7 days, have you experienced any of the following?  New or Increased Pain, New or Increased Fatigue, Loneliness, Social Isolation, Stress or Anger?: (!) New or Increased Fatigue  Do you get the social and emotional support that you need?: Yes  Do you have a Living Will?: Yes  Advance Directives     Power of  Living Will ACP-Advance Directive ACP-Power of     Not on File Filed on 11/18/20 Filed [address] [facility] [place] Risk Interventions:  · Poor self-assessment of health status: patient declines any further evaluation/treatment for this issue  · Fatigue: patient declines any further evaluation/treatment for this issue, he thinks he needs more sleep    Health Habits/Nutrition:  Health Habits/Nutrition  Do you exercise for at least 20 minutes 2-3 times per week?: (!) No  Have you lost any weight without trying in the past 3 months?: No  Do you eat only one meal per day?: No  Have you seen the dentist within the past year?: Yes  Body mass index: (!) 26.6  Health Habits/Nutrition Interventions:  · Inadequate physical activity:  patient is not ready to increase his/her physical activity level at this time, may try to walk more with the autumn weather    Hearing/Vision:  No exam data present  Hearing/Vision  Do you or your family notice any trouble with your hearing that hasn't been managed with hearing aids?: (!) Yes  Do you have difficulty driving, watching TV, or doing any of your daily activities because of your eyesight?: No  Have you had an eye exam within the past year?: (!) No  Hearing/Vision Interventions:  · Hearing concerns:  patient declines any further evaluation/treatment for hearing issues  · Vision concerns:  has appt scheduled in about nov      Personalized Preventive Plan   Current Health Maintenance Status  Immunization History Administered Date(s) Administered    Influenza Virus Vaccine 10/25/2019    Influenza, Kojo Starch, IM, PF (6 mo and older Fluzone, Flulaval, Fluarix, and 3 yrs and older Afluria) 09/03/2020    Influenza, Quadv, adjuvanted, 65 yrs +, IM, PF (Fluad) 09/03/2020    Influenza, Triv, inactivated, subunit, adjuvanted, IM (Fluad 65 yrs and older) 09/07/2018    Pneumococcal Conjugate 13-valent ([id number]) 04/30/2019    Pneumococcal Polysaccharide ([id number]) 09/02/2020    Tdap (Boostrix, Adacel) 09/03/2020        Health Maintenance   Topic Date Due    COVID-19 Vaccine (1) Never done    Shingles Vaccine (1 of 2) Never done   [employer] Visit (AWV)  Never done    Flu vaccine (1) 09/01/2021    A1C test (Diabetic or Prediabetic)  09/02/2021    Lipid screen  09/02/2025    Colon cancer screen colonoscopy  04/09/2026    DTaP/Tdap/Td vaccine (2 - Td or Tdap) 09/03/2030    Pneumococcal 65+ years Vaccine  Completed    Hepatitis C screen  Completed    Hepatitis A vaccine  Aged Out    Hepatitis B vaccine  Aged Out    Hib vaccine  Aged Out    Meningococcal (ACWY) vaccine  Aged Out     Recommendations for Acid Labs Due: see orders and patient instructions/AVS.  . Recommended screening schedule for the next 5-10 years is provided to the patient in written form: see Patient Instructions/AVS.    Angi Moore was seen today for medicare awv, 6 month follow-up and hyperlipidemia. Diagnoses and all orders for this visit:    Routine general medical examination at a health care facility    Mixed hyperlipidemia  -     Lipid Panel; Future  -     Comprehensive Metabolic Panel; Future    Elevated glucose  -     Hemoglobin A1C; Future    Tiredness  -     CBC Auto Differential; Future  -     Comprehensive Metabolic Panel; Future             bruising easily. Intermittent balance problem. Denies vertigo. Cares for wife with dementia. Follows with GI for elevated LFTs.     Recommended covid vaccine    Return in about 1 year (around 9/7/2022) for medicare wellness.       Bruno Jacobson MD

## 2021-09-08 ENCOUNTER — HOSPITAL ENCOUNTER (OUTPATIENT)
Age: 71
Discharge: HOME | End: 2021-09-08
Payer: MEDICARE

## 2021-09-08 DIAGNOSIS — E78.2 MIXED HYPERLIPIDEMIA: ICD-10-CM

## 2021-09-08 DIAGNOSIS — R53.83 TIREDNESS: ICD-10-CM

## 2021-09-08 DIAGNOSIS — R73.09 ELEVATED GLUCOSE: ICD-10-CM

## 2021-09-08 LAB
ALBUMIN SERPL-MCNC: 4.3 G/DL (ref 3.5–5.1)
ALP BLD-CCNC: 94 U/L (ref 38–126)
ALT SERPL-CCNC: 19 U/L (ref 11–66)
ANION GAP SERPL CALCULATED.3IONS-SCNC: 9 MEQ/L (ref 8–16)
AST SERPL-CCNC: 14 U/L (ref 5–40)
AVERAGE GLUCOSE: 117 MG/DL (ref 70–126)
BASOPHILS # BLD: 0.8 %
BASOPHILS ABSOLUTE: 0 THOU/MM3 (ref 0–0.1)
BILIRUB SERPL-MCNC: 0.3 MG/DL (ref 0.3–1.2)
BUN BLDV-MCNC: 24 MG/DL (ref 7–22)
CALCIUM SERPL-MCNC: 9.3 MG/DL (ref 8.5–10.5)
CHLORIDE BLD-SCNC: 106 MEQ/L (ref 98–111)
CHOLESTEROL, TOTAL: 225 MG/DL (ref 100–199)
CO2: 26 MEQ/L (ref 23–33)
CREAT SERPL-MCNC: 1.1 MG/DL (ref 0.4–1.2)
EOSINOPHIL # BLD: 3 %
EOSINOPHILS ABSOLUTE: 0.2 THOU/MM3 (ref 0–0.4)
ERYTHROCYTE [DISTWIDTH] IN BLOOD BY AUTOMATED COUNT: 13.2 % (ref 11.5–14.5)
ERYTHROCYTE [DISTWIDTH] IN BLOOD BY AUTOMATED COUNT: 43.8 FL (ref 35–45)
GFR SERPL CREATININE-BSD FRML MDRD: 66 ML/MIN/1.73M2
GLUCOSE BLD-MCNC: 108 MG/DL (ref 70–108)
HBA1C MFR BLD: 5.9 % (ref 4.4–6.4)
HCT VFR BLD CALC: 42.5 % (ref 42–52)
HDLC SERPL-MCNC: 28 MG/DL
HEMOGLOBIN: 14.2 GM/DL (ref 14–18)
IMMATURE GRANS (ABS): 0.01 THOU/MM3 (ref 0–0.07)
IMMATURE GRANULOCYTES: 0.2 %
LDL CHOLESTEROL CALCULATED: 125 MG/DL
LYMPHOCYTES # BLD: 25.9 %
LYMPHOCYTES ABSOLUTE: 1.6 THOU/MM3 (ref 1–4.8)
MCH RBC QN AUTO: 30.7 PG (ref 26–33)
MCHC RBC AUTO-ENTMCNC: 33.4 GM/DL (ref 32.2–35.5)
MCV RBC AUTO: 91.8 FL (ref 80–94)
MONOCYTES # BLD: 8.4 %
MONOCYTES ABSOLUTE: 0.5 THOU/MM3 (ref 0.4–1.3)
NUCLEATED RED BLOOD CELLS: 0 /100 WBC
PLATELET # BLD: 122 THOU/MM3 (ref 130–400)
PMV BLD AUTO: 12.6 FL (ref 9.4–12.4)
POTASSIUM SERPL-SCNC: 4.3 MEQ/L (ref 3.5–5.2)
RBC # BLD: 4.63 MILL/MM3 (ref 4.7–6.1)
SEG NEUTROPHILS: 61.7 %
SEGMENTED NEUTROPHILS ABSOLUTE COUNT: 3.8 THOU/MM3 (ref 1.8–7.7)
SODIUM BLD-SCNC: 141 MEQ/L (ref 135–145)
TOTAL PROTEIN: 6.6 G/DL (ref 6.1–8)
TRIGL SERPL-MCNC: 358 MG/DL (ref 0–199)
WBC # BLD: 6.1 THOU/MM3 (ref 4.8–10.8)

## 2021-09-08 PROCEDURE — 80061 LIPID PANEL: CPT

## 2021-09-08 PROCEDURE — 80053 COMPREHEN METABOLIC PANEL: CPT

## 2021-09-08 PROCEDURE — 83036 HEMOGLOBIN GLYCOSYLATED A1C: CPT

## 2021-09-08 PROCEDURE — 36415 COLL VENOUS BLD VENIPUNCTURE: CPT

## 2021-09-08 PROCEDURE — 85025 COMPLETE CBC W/AUTO DIFF WBC: CPT

## 2021-10-07 NOTE — PROGRESS NOTES
[address] [facility].  [address]  [facility] [address]  Dept: [phone]  Loc: [phone]    Visit Date: 10/8/2021        HPI:     Carlitos Koch is a 66 y.o. male who presents today for:  Chief Complaint   Patient presents with    Follow-up     bph and elevated psa-pvr today       HPI   Pt seen in follow up for BPH and elevated PSA.      [place] moved to [place] from [facility] 2018 where he was following with a Dr. Aav Enriquez at [facility] [place] [address] PSAs noted of 3.78 4/28/17 and 2.86 11/30/17.  PSA 9/14/18 3. 25.  Pt denies any family hx of breast or prostate cancer.  No known abnormal TANISHA in the past. [place] has been on Flomax 0.4 mg daily since December of 2017.      Pt reports urinary symptoms stable. Wakes 3 x per night. Has IPSS score today of 15. Notes mostly satisfied with QOL secondary to urinary symptoms. Notes part of his waking at night is secondary to his wife waking up. Current Outpatient Medications   Medication Sig Dispense Refill    tamsulosin (FLOMAX) 0.4 MG capsule Take 1 capsule by mouth daily 90 capsule 3    zinc sulfate (ZINCATE) 220 (50 Zn) MG capsule Take 1 capsule by mouth daily 30 capsule 3    vitamin C (VITAMIN C) 1000 MG tablet Take 1 tablet by mouth daily 30 tablet 3    Vitamin D (CHOLECALCIFEROL) 50 MCG (2000 UT) TABS tablet Take 1 tablet by mouth daily 60 tablet     ketoconazole (NIZORAL) 2 % shampoo Apply topically daily as needed for Itching Apply topically daily as needed.  Multiple Vitamins-Minerals (MULTIVITAMIN-MINERALS) TABS tablet Take 1 tablet by mouth daily      aspirin EC 81 MG EC tablet Take 81 mg by mouth daily       No current facility-administered medications for this visit. Past Medical History  Gallardo  has a past medical history of BPH (benign prostatic hyperplasia), COVID-19, Hepatitis B, Hyperlipidemia, and Psoriasis.     Past Surgical History  The patient  has a past surgical history that includes back surgery (2015) and Mouth surgery (1968). Family History  This patient's family history includes Asthma in his father; Cancer in his brother and sister; Emphysema in his father; High Cholesterol in his mother. Social History  Gallardo  reports that he has never smoked. He has never used smokeless tobacco. He reports that he does not drink alcohol and does not use drugs. Subjective:      Review of Systems   Constitutional: Negative for activity change, appetite change, chills, diaphoresis, fatigue, fever and unexpected weight change. Gastrointestinal: Negative for abdominal pain, nausea and vomiting. Genitourinary: Negative for decreased urine volume, difficulty urinating, dysuria, flank pain, frequency, hematuria and urgency. Musculoskeletal: Negative for back pain. Objective:   /72   Ht 5' 10\" (1.778 m)   Wt 187 lb (84.8 kg)   BMI 26.83 kg/m²     Physical Exam  Vitals reviewed. Constitutional:       General: He is not in acute distress. Appearance: Normal appearance. He is well-developed. He is not ill-appearing or diaphoretic. HENT:      Head: Normocephalic and atraumatic. Right Ear: External ear normal.      Left Ear: External ear normal.      Nose: Nose normal.      Mouth/Throat:      Mouth: Mucous membranes are moist.   Eyes:      General: No scleral icterus. Right eye: No discharge. Left eye: No discharge. Neck:      Vascular: No JVD. Trachea: No tracheal deviation. Pulmonary:      Effort: Pulmonary effort is normal. No respiratory distress. Abdominal:      General: There is no distension. Tenderness: There is no abdominal tenderness. There is no right CVA tenderness or left CVA tenderness. Genitourinary:     Comments: Prostate moderately enlarged at least 50 grams with right lobe slightly larger than left. Smooth. No nodule or induration. Musculoskeletal:         General: Normal range of motion. Neurological:      Mental Status: He is alert and oriented to person, place, and time. Mental status is at baseline. Psychiatric:         Mood and Affect: Mood normal.         Behavior: Behavior normal.         Thought Content: Thought content normal.         POC  Results for POC orders placed in visit on 10/08/21   POCT Urinalysis No Micro (Auto)   Result Value Ref Range    Glucose, Ur Negative NEGATIVE mg/dl    Bilirubin Urine Negative     Ketones, Urine Negative NEGATIVE    Specific Gravity, Urine 1.025 1.002 - 1.030    Blood, UA POC Negative NEGATIVE    pH, Urine 5.50 5.0 - 9.0    Protein, Urine Negative NEGATIVE mg/dl    Urobilinogen, Urine 0.20 0.0 - 1.0 eu/dl    Nitrite, Urine Negative NEGATIVE    Leukocyte Clumps, Urine Negative NEGATIVE    Color, Urine Yellow YELLOW-STRAW    Character, Urine Clear CLR-SL.CLOUD   poct post void residual   Result Value Ref Range    post void residual 27 ml         Patients recent PSA values are as follows  Lab Results   Component Value Date    PSA 3.11 (H) 09/02/2020    PSA 3.29 (H) 09/16/2019    PSA 3.37 (H) 04/03/2019        Recent BUN/Creatinine:  Lab Results   Component Value Date    BUN 24 09/08/2021    CREATININE 1.1 09/08/2021       Assessment:   BPH with LUTs  Hx elevated PSA    Plan:     LUTs stable on Flomax. PVR 27. Urine dip negative. Continue Flomax 0.4 mg daily. TANISHA without nodule or induration. Check PSA and call results. F/u in 1 year with PVR. PSA now.

## 2021-10-08 ENCOUNTER — TELEPHONE (OUTPATIENT)
Dept: UROLOGY | Age: 71
End: 2021-10-08

## 2021-10-08 ENCOUNTER — OFFICE VISIT (OUTPATIENT)
Dept: UROLOGY | Age: 71
End: 2021-10-08
Payer: MEDICARE

## 2021-10-08 ENCOUNTER — HOSPITAL ENCOUNTER (OUTPATIENT)
Age: 71
Discharge: HOME | End: 2021-10-08
Payer: MEDICARE

## 2021-10-08 VITALS
SYSTOLIC BLOOD PRESSURE: 130 MMHG | HEIGHT: 70 IN | WEIGHT: 187 LBS | DIASTOLIC BLOOD PRESSURE: 72 MMHG | BODY MASS INDEX: 26.77 KG/M2

## 2021-10-08 DIAGNOSIS — R97.20 ELEVATED PSA: Primary | ICD-10-CM

## 2021-10-08 DIAGNOSIS — N40.1 BENIGN PROSTATIC HYPERPLASIA WITH NOCTURIA: ICD-10-CM

## 2021-10-08 DIAGNOSIS — R97.20 ELEVATED PSA: ICD-10-CM

## 2021-10-08 DIAGNOSIS — R35.1 BENIGN PROSTATIC HYPERPLASIA WITH NOCTURIA: ICD-10-CM

## 2021-10-08 LAB
BILIRUBIN URINE: NEGATIVE
BLOOD URINE, POC: NEGATIVE
CHARACTER, URINE: CLEAR
COLOR, URINE: YELLOW
GLUCOSE URINE: NEGATIVE MG/DL
KETONES, URINE: NEGATIVE
LEUKOCYTE CLUMPS, URINE: NEGATIVE
NITRITE, URINE: NEGATIVE
PH, URINE: 5.5 (ref 5–9)
POST VOID RESIDUAL (PVR): 27 ML
PROSTATE SPECIFIC ANTIGEN: 3.7 NG/ML (ref 0–1)
PROTEIN, URINE: NEGATIVE MG/DL
SPECIFIC GRAVITY, URINE: 1.02 (ref 1–1.03)
UROBILINOGEN, URINE: 0.2 EU/DL (ref 0–1)

## 2021-10-08 PROCEDURE — 1036F TOBACCO NON-USER: CPT | Performed by: NURSE PRACTITIONER

## 2021-10-08 PROCEDURE — 3017F COLORECTAL CA SCREEN DOC REV: CPT | Performed by: NURSE PRACTITIONER

## 2021-10-08 PROCEDURE — 4040F PNEUMOC VAC/ADMIN/RCVD: CPT | Performed by: NURSE PRACTITIONER

## 2021-10-08 PROCEDURE — 36415 COLL VENOUS BLD VENIPUNCTURE: CPT

## 2021-10-08 PROCEDURE — G8427 DOCREV CUR MEDS BY ELIG CLIN: HCPCS | Performed by: NURSE PRACTITIONER

## 2021-10-08 PROCEDURE — 81003 URINALYSIS AUTO W/O SCOPE: CPT | Performed by: NURSE PRACTITIONER

## 2021-10-08 PROCEDURE — G8482 FLU IMMUNIZE ORDER/ADMIN: HCPCS | Performed by: NURSE PRACTITIONER

## 2021-10-08 PROCEDURE — 99213 OFFICE O/P EST LOW 20 MIN: CPT | Performed by: NURSE PRACTITIONER

## 2021-10-08 PROCEDURE — 84153 ASSAY OF PSA TOTAL: CPT

## 2021-10-08 PROCEDURE — G8417 CALC BMI ABV UP PARAM F/U: HCPCS | Performed by: NURSE PRACTITIONER

## 2021-10-08 PROCEDURE — 1123F ACP DISCUSS/DSCN MKR DOCD: CPT | Performed by: NURSE PRACTITIONER

## 2021-10-08 PROCEDURE — 51798 US URINE CAPACITY MEASURE: CPT | Performed by: NURSE PRACTITIONER

## 2021-10-08 RX ORDER — TAMSULOSIN HYDROCHLORIDE 0.4 MG/1
0.4 CAPSULE ORAL DAILY
Qty: 90 CAPSULE | Refills: 3 | Status: SHIPPED | OUTPATIENT
Start: 2021-10-08 | End: 2022-10-07 | Stop reason: SDUPTHER

## 2021-10-08 ASSESSMENT — ENCOUNTER SYMPTOMS
NAUSEA: 0
BACK PAIN: 0
VOMITING: 0
ABDOMINAL PAIN: 0

## 2021-10-11 NOTE — TELEPHONE ENCOUNTER
Patient advised PSA was 3.7 and to repeat in 1 year. He voiced understanding and order sent via mail.

## 2022-01-20 ENCOUNTER — HOSPITAL ENCOUNTER (OUTPATIENT)
Dept: CT IMAGING | Age: 72
Discharge: HOME | End: 2022-01-20
Payer: MEDICARE

## 2022-01-20 DIAGNOSIS — K76.89 COMPENSATORY LOBAR HYPERPLASIA OF LIVER: ICD-10-CM

## 2022-01-20 LAB
CREAT SERPL-MCNC: 1.1 MG/DL (ref 0.5–1.2)
GFR, ESTIMATED: 70 ML/MIN/1.73M2

## 2022-01-20 PROCEDURE — 6360000004 HC RX CONTRAST MEDICATION: Performed by: NURSE PRACTITIONER

## 2022-01-20 PROCEDURE — 74178 CT ABD&PLV WO CNTR FLWD CNTR: CPT

## 2022-01-20 PROCEDURE — 82565 ASSAY OF CREATININE: CPT

## 2022-01-20 RX ADMIN — IOHEXOL 50 ML: 240 INJECTION, SOLUTION INTRATHECAL; INTRAVASCULAR; INTRAVENOUS; ORAL at 10:11

## 2022-01-20 RX ADMIN — IOPAMIDOL 85 ML: 755 INJECTION, SOLUTION INTRAVENOUS at 10:11

## 2022-02-28 NOTE — CONSULTS
Patient seen and examined; note dictated. A/P  1. Patient recoveringt from Vovid pneumonia; noted to have elevated liver tests. RUQ US just suggests probable hemangioma. Will check for other forms of liver disease;   And check liver transaminases in am.
CANCER:  History of cancer. PHYSICAL EXAMINATION:  GENERAL:  Awake, alert, and oriented x3. VITAL SIGNS:  The patient is afebrile. Heart rate is 71, respiratory  rate 18, blood pressure 175/83, oxygen saturation 93%. HEENT:  Normocephalic, atraumatic. EOMI:  Sclerae nonicteric. Mouth:   Pink and moist.  NECK:  Supple. No JVD. LUNGS:  Clear anteriorly. HEART:  Regular rhythm and rate. ABDOMEN:  Soft, nontender. No guarding. RECTAL:  Not done at this time. EXTREMITIES:  Without edema. NEUROLOGIC:  Awake, alert, and oriented x3. DIAGNOSTIC DATA:  The patient did have an ultrasound done which showed  normal liver, did suggest there may be hemangioma, but otherwise is  unremarkable. There is no ductal dilation. IMPRESSION:  1. Elevated liver transaminases. 2.  Recent influenza and COVID coinfection. 3.  History of psoriasis. 4.  History of hepatitis B in the past.  5.  Had colonoscopy four years ago. PLAN:  1. The patient with elevated liver tests. These are trending down at  this point. We will repeat them in the morning. His highest number was  , now down to 378. AST went from 136 down to 75. Alk phos is  also trending down. Bilirubin is 0.4.  2.  I will check other forms of liver disease. In addition, we will  confirm he does not have hepatitis B virus in the blood. We will check  for signs of hemochromatosis, primary biliary cirrhosis, autoimmune  hepatitis, etc.  3.  History of psoriasis. 4.  Hyperlipidemia. Total time was 55 minutes. ISMAEL Moreau M.D.    D: 11/22/2020 18:05:31       T: 11/22/2020 19:30:16     [id number]  Job#: [id number]     Doc#: [id number]    CC:
Localized Dermabrasion With Wire Brush Text: The patient was draped in routine manner.  Localized dermabrasion using 3 x 17 mm wire brush was performed in routine manner to papillary dermis. This spot dermabrasion is being performed to complete skin cancer reconstruction. It also will eliminate the other sun damaged precancerous cells that are known to be part of the regional effect of a lifetime's worth of sun exposure. This localized dermabrasion is therapeutic and should not be considered cosmetic in any regard.
Localized Dermabrasion Text: The patient was draped in routine manner.  Localized dermabrasion using 3 x 17 mm wire brush was performed in routine manner to papillary dermis. This spot dermabrasion is being performed to complete skin cancer reconstruction. It also will eliminate the other sun damaged precancerous cells that are known to be part of the regional effect of a lifetime's worth of sun exposure. This localized dermabrasion is therapeutic and should not be considered cosmetic in any regard.

## 2022-04-18 ENCOUNTER — HOSPITAL ENCOUNTER (OUTPATIENT)
Age: 72
Discharge: HOME | End: 2022-04-18
Payer: MEDICARE

## 2022-04-18 LAB
ALBUMIN SERPL-MCNC: 4.7 G/DL (ref 3.5–5.1)
ALP BLD-CCNC: 110 U/L (ref 38–126)
ALT SERPL-CCNC: 20 U/L (ref 11–66)
AST SERPL-CCNC: 16 U/L (ref 5–40)
BILIRUB SERPL-MCNC: 0.3 MG/DL (ref 0.3–1.2)
BILIRUBIN DIRECT: < 0.2 MG/DL (ref 0–0.3)
TOTAL PROTEIN: 6.8 G/DL (ref 6.1–8)

## 2022-04-18 PROCEDURE — 36415 COLL VENOUS BLD VENIPUNCTURE: CPT

## 2022-04-18 PROCEDURE — 80076 HEPATIC FUNCTION PANEL: CPT

## 2022-06-10 ENCOUNTER — HOSPITAL ENCOUNTER (INPATIENT)
Age: 72
LOS: 2 days | Discharge: HOME | DRG: 313 | End: 2022-06-12
Attending: EMERGENCY MEDICINE
Payer: MEDICARE

## 2022-06-10 ENCOUNTER — APPOINTMENT (OUTPATIENT)
Dept: GENERAL RADIOLOGY | Age: 72
DRG: 313 | End: 2022-06-10
Payer: MEDICARE

## 2022-06-10 DIAGNOSIS — R07.9 ACUTE CHEST PAIN: Primary | ICD-10-CM

## 2022-06-10 DIAGNOSIS — I20.8 OTHER FORMS OF ANGINA PECTORIS (HCC): ICD-10-CM

## 2022-06-10 LAB
ANION GAP SERPL CALCULATED.3IONS-SCNC: 16 MEQ/L (ref 8–16)
BASOPHILS # BLD: 1 %
BASOPHILS ABSOLUTE: 0.1 THOU/MM3 (ref 0–0.1)
BUN BLDV-MCNC: 24 MG/DL (ref 7–22)
CALCIUM SERPL-MCNC: 9.7 MG/DL (ref 8.5–10.5)
CHLORIDE BLD-SCNC: 100 MEQ/L (ref 98–111)
CO2: 25 MEQ/L (ref 23–33)
CREAT SERPL-MCNC: 1.1 MG/DL (ref 0.4–1.2)
D-DIMER QUANTITATIVE: 436 NG/ML FEU (ref 0–500)
EKG ATRIAL RATE: 66 BPM
EKG ATRIAL RATE: 71 BPM
EKG P AXIS: 30 DEGREES
EKG P AXIS: 37 DEGREES
EKG P-R INTERVAL: 158 MS
EKG P-R INTERVAL: 166 MS
EKG Q-T INTERVAL: 396 MS
EKG Q-T INTERVAL: 420 MS
EKG QRS DURATION: 82 MS
EKG QRS DURATION: 94 MS
EKG QTC CALCULATION (BAZETT): 430 MS
EKG QTC CALCULATION (BAZETT): 440 MS
EKG R AXIS: 25 DEGREES
EKG R AXIS: 51 DEGREES
EKG T AXIS: -156 DEGREES
EKG T AXIS: 90 DEGREES
EKG VENTRICULAR RATE: 66 BPM
EKG VENTRICULAR RATE: 71 BPM
EOSINOPHIL # BLD: 2.5 %
EOSINOPHILS ABSOLUTE: 0.1 THOU/MM3 (ref 0–0.4)
ERYTHROCYTE [DISTWIDTH] IN BLOOD BY AUTOMATED COUNT: 12.6 % (ref 11.5–14.5)
ERYTHROCYTE [DISTWIDTH] IN BLOOD BY AUTOMATED COUNT: 41.4 FL (ref 35–45)
GFR SERPL CREATININE-BSD FRML MDRD: 66 ML/MIN/1.73M2
GLUCOSE BLD-MCNC: 126 MG/DL (ref 70–108)
HCT VFR BLD CALC: 43.9 % (ref 42–52)
HEMOGLOBIN: 14.5 GM/DL (ref 14–18)
IMMATURE GRANS (ABS): 0.01 THOU/MM3 (ref 0–0.07)
IMMATURE GRANULOCYTES: 0.2 %
LYMPHOCYTES # BLD: 29.5 %
LYMPHOCYTES ABSOLUTE: 1.5 THOU/MM3 (ref 1–4.8)
MCH RBC QN AUTO: 29.8 PG (ref 26–33)
MCHC RBC AUTO-ENTMCNC: 33 GM/DL (ref 32.2–35.5)
MCV RBC AUTO: 90.3 FL (ref 80–94)
MONOCYTES # BLD: 7 %
MONOCYTES ABSOLUTE: 0.4 THOU/MM3 (ref 0.4–1.3)
NUCLEATED RED BLOOD CELLS: 0 /100 WBC
OSMOLALITY CALCULATION: 286.8 MOSMOL/KG (ref 275–300)
PLATELET # BLD: 145 THOU/MM3 (ref 130–400)
PMV BLD AUTO: 11.6 FL (ref 9.4–12.4)
POTASSIUM REFLEX MAGNESIUM: 4.5 MEQ/L (ref 3.5–5.2)
PRO-BNP: 128.4 PG/ML (ref 0–900)
RBC # BLD: 4.86 MILL/MM3 (ref 4.7–6.1)
SEG NEUTROPHILS: 59.8 %
SEGMENTED NEUTROPHILS ABSOLUTE COUNT: 3 THOU/MM3 (ref 1.8–7.7)
SODIUM BLD-SCNC: 141 MEQ/L (ref 135–145)
TROPONIN T: < 0.01 NG/ML
WBC # BLD: 5.1 THOU/MM3 (ref 4.8–10.8)

## 2022-06-10 PROCEDURE — 1200000003 HC TELEMETRY R&B

## 2022-06-10 PROCEDURE — 6370000000 HC RX 637 (ALT 250 FOR IP): Performed by: NURSE PRACTITIONER

## 2022-06-10 PROCEDURE — 71045 X-RAY EXAM CHEST 1 VIEW: CPT

## 2022-06-10 PROCEDURE — 93010 ELECTROCARDIOGRAM REPORT: CPT | Performed by: INTERNAL MEDICINE

## 2022-06-10 PROCEDURE — 6360000002 HC RX W HCPCS: Performed by: NURSE PRACTITIONER

## 2022-06-10 PROCEDURE — 83880 ASSAY OF NATRIURETIC PEPTIDE: CPT

## 2022-06-10 PROCEDURE — 2580000003 HC RX 258: Performed by: NURSE PRACTITIONER

## 2022-06-10 PROCEDURE — 80048 BASIC METABOLIC PNL TOTAL CA: CPT

## 2022-06-10 PROCEDURE — 85379 FIBRIN DEGRADATION QUANT: CPT

## 2022-06-10 PROCEDURE — 99285 EMERGENCY DEPT VISIT HI MDM: CPT

## 2022-06-10 PROCEDURE — 99215 OFFICE O/P EST HI 40 MIN: CPT

## 2022-06-10 PROCEDURE — 36415 COLL VENOUS BLD VENIPUNCTURE: CPT

## 2022-06-10 PROCEDURE — 85025 COMPLETE CBC W/AUTO DIFF WBC: CPT

## 2022-06-10 PROCEDURE — 99223 1ST HOSP IP/OBS HIGH 75: CPT | Performed by: NURSE PRACTITIONER

## 2022-06-10 PROCEDURE — 93005 ELECTROCARDIOGRAM TRACING: CPT | Performed by: NURSE PRACTITIONER

## 2022-06-10 PROCEDURE — 84484 ASSAY OF TROPONIN QUANT: CPT

## 2022-06-10 RX ORDER — POLYETHYLENE GLYCOL 3350 17 G/17G
17 POWDER, FOR SOLUTION ORAL DAILY PRN
Status: DISCONTINUED | OUTPATIENT
Start: 2022-06-10 | End: 2022-06-12 | Stop reason: HOSPADM

## 2022-06-10 RX ORDER — ASCORBIC ACID 500 MG
1000 TABLET ORAL DAILY
Status: DISCONTINUED | OUTPATIENT
Start: 2022-06-10 | End: 2022-06-12 | Stop reason: HOSPADM

## 2022-06-10 RX ORDER — SODIUM CHLORIDE 0.9 % (FLUSH) 0.9 %
5-40 SYRINGE (ML) INJECTION EVERY 12 HOURS SCHEDULED
Status: DISCONTINUED | OUTPATIENT
Start: 2022-06-10 | End: 2022-06-12 | Stop reason: HOSPADM

## 2022-06-10 RX ORDER — ASPIRIN 81 MG/1
81 TABLET ORAL DAILY
Status: DISCONTINUED | OUTPATIENT
Start: 2022-06-10 | End: 2022-06-12 | Stop reason: HOSPADM

## 2022-06-10 RX ORDER — ONDANSETRON 4 MG/1
4 TABLET, ORALLY DISINTEGRATING ORAL EVERY 8 HOURS PRN
Status: DISCONTINUED | OUTPATIENT
Start: 2022-06-10 | End: 2022-06-12 | Stop reason: HOSPADM

## 2022-06-10 RX ORDER — PHENOL 1.4 %
AEROSOL, SPRAY (ML) MUCOUS MEMBRANE DAILY PRN
COMMUNITY

## 2022-06-10 RX ORDER — VITAMIN B COMPLEX
2000 TABLET ORAL DAILY
Status: DISCONTINUED | OUTPATIENT
Start: 2022-06-10 | End: 2022-06-12 | Stop reason: HOSPADM

## 2022-06-10 RX ORDER — TAMSULOSIN HYDROCHLORIDE 0.4 MG/1
0.4 CAPSULE ORAL DAILY
Status: DISCONTINUED | OUTPATIENT
Start: 2022-06-10 | End: 2022-06-12 | Stop reason: HOSPADM

## 2022-06-10 RX ORDER — SODIUM CHLORIDE 9 MG/ML
INJECTION, SOLUTION INTRAVENOUS PRN
Status: DISCONTINUED | OUTPATIENT
Start: 2022-06-10 | End: 2022-06-12 | Stop reason: HOSPADM

## 2022-06-10 RX ORDER — ZINC SULFATE 50(220)MG
50 CAPSULE ORAL DAILY
Status: DISCONTINUED | OUTPATIENT
Start: 2022-06-10 | End: 2022-06-12 | Stop reason: HOSPADM

## 2022-06-10 RX ORDER — SODIUM CHLORIDE 0.9 % (FLUSH) 0.9 %
5-40 SYRINGE (ML) INJECTION PRN
Status: DISCONTINUED | OUTPATIENT
Start: 2022-06-10 | End: 2022-06-12 | Stop reason: HOSPADM

## 2022-06-10 RX ORDER — NITROGLYCERIN 0.4 MG/1
0.4 TABLET SUBLINGUAL EVERY 5 MIN PRN
Status: DISCONTINUED | OUTPATIENT
Start: 2022-06-10 | End: 2022-06-12 | Stop reason: HOSPADM

## 2022-06-10 RX ORDER — TRIAMCINOLONE ACETONIDE 1 MG/G
CREAM TOPICAL DAILY
COMMUNITY

## 2022-06-10 RX ORDER — ACETAMINOPHEN 650 MG/1
650 SUPPOSITORY RECTAL EVERY 6 HOURS PRN
Status: DISCONTINUED | OUTPATIENT
Start: 2022-06-10 | End: 2022-06-12 | Stop reason: HOSPADM

## 2022-06-10 RX ORDER — MULTIVITAMIN WITH IRON
1 TABLET ORAL DAILY
Status: DISCONTINUED | OUTPATIENT
Start: 2022-06-10 | End: 2022-06-12 | Stop reason: HOSPADM

## 2022-06-10 RX ORDER — HYDROXYZINE HYDROCHLORIDE 10 MG/1
TABLET, FILM COATED ORAL DAILY PRN
COMMUNITY
Start: 2022-02-22

## 2022-06-10 RX ORDER — HYDRALAZINE HYDROCHLORIDE 20 MG/ML
10 INJECTION INTRAMUSCULAR; INTRAVENOUS EVERY 6 HOURS PRN
Status: DISCONTINUED | OUTPATIENT
Start: 2022-06-10 | End: 2022-06-12 | Stop reason: HOSPADM

## 2022-06-10 RX ORDER — ACETAMINOPHEN 325 MG/1
650 TABLET ORAL EVERY 6 HOURS PRN
Status: DISCONTINUED | OUTPATIENT
Start: 2022-06-10 | End: 2022-06-12 | Stop reason: HOSPADM

## 2022-06-10 RX ORDER — ASPIRIN 81 MG/1
324 TABLET, CHEWABLE ORAL ONCE
Status: COMPLETED | OUTPATIENT
Start: 2022-06-10 | End: 2022-06-10

## 2022-06-10 RX ORDER — ONDANSETRON 2 MG/ML
4 INJECTION INTRAMUSCULAR; INTRAVENOUS EVERY 6 HOURS PRN
Status: DISCONTINUED | OUTPATIENT
Start: 2022-06-10 | End: 2022-06-12 | Stop reason: HOSPADM

## 2022-06-10 RX ORDER — ENOXAPARIN SODIUM 100 MG/ML
40 INJECTION SUBCUTANEOUS EVERY 24 HOURS
Status: DISCONTINUED | OUTPATIENT
Start: 2022-06-10 | End: 2022-06-12 | Stop reason: HOSPADM

## 2022-06-10 RX ADMIN — ASPIRIN 81 MG 324 MG: 81 TABLET ORAL at 08:56

## 2022-06-10 RX ADMIN — HYDRALAZINE HYDROCHLORIDE 10 MG: 20 INJECTION, SOLUTION INTRAMUSCULAR; INTRAVENOUS at 18:55

## 2022-06-10 RX ADMIN — ENOXAPARIN SODIUM 40 MG: 100 INJECTION SUBCUTANEOUS at 21:04

## 2022-06-10 RX ADMIN — SODIUM CHLORIDE, PRESERVATIVE FREE 10 ML: 5 INJECTION INTRAVENOUS at 21:05

## 2022-06-10 RX ADMIN — TAMSULOSIN HYDROCHLORIDE 0.4 MG: 0.4 CAPSULE ORAL at 21:17

## 2022-06-10 RX ADMIN — ACETAMINOPHEN 650 MG: 325 TABLET ORAL at 23:42

## 2022-06-10 ASSESSMENT — PAIN - FUNCTIONAL ASSESSMENT
PAIN_FUNCTIONAL_ASSESSMENT: 0-10
PAIN_FUNCTIONAL_ASSESSMENT: PREVENTS OR INTERFERES SOME ACTIVE ACTIVITIES AND ADLS
PAIN_FUNCTIONAL_ASSESSMENT: NONE - DENIES PAIN
PAIN_FUNCTIONAL_ASSESSMENT: 0-10
PAIN_FUNCTIONAL_ASSESSMENT: PREVENTS OR INTERFERES SOME ACTIVE ACTIVITIES AND ADLS
PAIN_FUNCTIONAL_ASSESSMENT: NONE - DENIES PAIN
PAIN_FUNCTIONAL_ASSESSMENT: 0-10

## 2022-06-10 ASSESSMENT — PAIN DESCRIPTION - ONSET
ONSET: SUDDEN
ONSET: SUDDEN

## 2022-06-10 ASSESSMENT — PAIN DESCRIPTION - PAIN TYPE
TYPE: ACUTE PAIN

## 2022-06-10 ASSESSMENT — PAIN DESCRIPTION - DESCRIPTORS
DESCRIPTORS: ACHING
DESCRIPTORS: ACHING
DESCRIPTORS: SHARP
DESCRIPTORS: SHARP

## 2022-06-10 ASSESSMENT — ENCOUNTER SYMPTOMS
CONSTIPATION: 0
NAUSEA: 0
NAUSEA: 1
SHORTNESS OF BREATH: 1
COUGH: 0
EYES NEGATIVE: 1
ABDOMINAL PAIN: 0
DIARRHEA: 0
ALLERGIC/IMMUNOLOGIC NEGATIVE: 1
VOMITING: 0

## 2022-06-10 ASSESSMENT — PAIN SCALES - GENERAL
PAINLEVEL_OUTOF10: 2
PAINLEVEL_OUTOF10: 2
PAINLEVEL_OUTOF10: 8
PAINLEVEL_OUTOF10: 0
PAINLEVEL_OUTOF10: 0
PAINLEVEL_OUTOF10: 7

## 2022-06-10 ASSESSMENT — PAIN DESCRIPTION - ORIENTATION
ORIENTATION: LEFT
ORIENTATION: MID
ORIENTATION: LEFT

## 2022-06-10 ASSESSMENT — PAIN DESCRIPTION - LOCATION
LOCATION: HEAD
LOCATION: CHEST

## 2022-06-10 ASSESSMENT — HEART SCORE: ECG: 1

## 2022-06-10 ASSESSMENT — PAIN DESCRIPTION - FREQUENCY
FREQUENCY: INTERMITTENT

## 2022-06-10 NOTE — H&P
History & Physical    Patient:  Demetris Sales  YOB: 1950  Date of Service: 6/10/2022  MRN: [id number]   Acct:  [de-identified]   Primary Care Physician: Tiffany Sarmiento MD    Chief Complaint: Chest pain. Assessment / Plan:    1. Chest pain, resolved. Troponin x 2 negative. EKG with ST changes in the inferior leads. Trend to rule out ACS. Obtain fasting lipid panel in AM. Check echocardiogram. Consult to cardiology for possible stress test on Saturday. 2. Mild bradycardia, HR high 50's. Not taking any AV blocking medications. Maintain tele. Repeat EKG in AM.  3. Near syncope, likely due to anxiety / hyperventilation. 4. Accelerated hypertension, improved. Monitor BP. Hydralazine PRN. 5. Elevated LFTs secondary to COVID 19. Check LFT's in AM.  6. BPH. Resume Flomax        History of Present Illness:   History obtained from chart review and the patient. The patient is a 66 y.o. male who presents with chest pain. Patient reports being in his normal state of health. Around 0830 he was typing on the computer, went to print a paper out. Developed acute onset substernal chest pain. Felt like \"someone punched me\". Took his breath away. Felt dizzy and anxious. Pain did not radiate. No nausea. He was able to walk up the stairs. Son drove him to urgent care. On arrival CP was subsiding. He was sent here for evaluation. Pain was relieved by itself with no intervention. No recurrent pain since here. It is of note patient does report for the last month having increased indigestion symptom including burping. This has martha relieved with Pepcid a couple times per week.       Past Medical History:        Diagnosis Date    BPH (benign prostatic hyperplasia)     COVID-19 11/12/2020    Hepatitis B     Hyperlipidemia     Psoriasis        Past Surgical History:        Procedure Laterality Date    BACK SURGERY  2015    Herniated disk     MOUTH SURGERY  1968       Home Medications:   No current facility-administered medications on file prior to encounter. Current Outpatient Medications on File Prior to Encounter   Medication Sig Dispense Refill    tamsulosin (FLOMAX) 0.4 MG capsule Take 1 capsule by mouth daily 90 capsule 3    zinc sulfate (ZINCATE) 220 (50 Zn) MG capsule Take 1 capsule by mouth daily 30 capsule 3    vitamin C (VITAMIN C) 1000 MG tablet Take 1 tablet by mouth daily 30 tablet 3    Vitamin D (CHOLECALCIFEROL) 50 MCG (2000 UT) TABS tablet Take 1 tablet by mouth daily 60 tablet     ketoconazole (NIZORAL) 2 % shampoo Apply topically daily as needed for Itching Apply topically daily as needed.  Multiple Vitamins-Minerals (MULTIVITAMIN-MINERALS) TABS tablet Take 1 tablet by mouth daily      aspirin EC 81 MG EC tablet Take 81 mg by mouth daily         Allergies:  Crestor [rosuvastatin calcium] and Benadryl [diphenhydramine]    Social History:    reports that he has never smoked. He has never used smokeless tobacco. He reports that he does not drink alcohol and does not use drugs. Family History:       Problem Relation Age of Onset    High Cholesterol Mother     Emphysema Father     Asthma Father     Cancer Sister     Cancer Brother        Review of systems:  Constitutional: no fever, no night sweats, + fatigue  Head: no headache, no head injury, no migranes. Eye: no blurring of vision, no double vision.   Ears: no hearing difficulty, no tinnitus  Mouth/throat: no ulceration, dental caries, dysphagia  Lungs: no cough, no shortness of breath, no wheeze  CVS: no palpitation, + chest pain, + shortness of breath  GI: no abdominal pain, no nausea , no vomiting, no constipation  BRET: no dysuria, frequency and urgency, no hematuria, no kidney stones  Musculoskeletal: no joint pain, swelling , stiffness  Endocrine: no polyuria, polydypsia, no cold or heat intolerence  Hematology: no anemia, no easy brusing or bleeding, no hx of clotting disorder  Dermatology: no skin rash, no eczema, no prurities,  Psychiatry: no depression, no anxiety,no panic attacks, no suicide ideation  Neurology: + near syncope, no seizures, no numbness or tingling of hands, no numbness or tingling of feet, no paresis    10 point review of systems completed, all other than noted above are negative. Vitals:   Vitals:    06/10/22 1356   BP: (!) 153/82   Pulse: 50   Resp: 18   Temp:    SpO2: 98%      BMI: Body mass index is 26.26 kg/m².                 Exam:  Physical Examination: General appearance - alert, well appearing, and in no distress  Mental status - alert, oriented to person, place, and time  Neck - supple, no significant adenopathy, no JVD, or carotid bruits  Chest - clear to auscultation, no wheezes, rales or rhonchi, symmetric air entry  Heart - normal rate, regular rhythm, normal S1, S2, no murmurs, rubs, clicks or gallops  Abdomen - soft, nontender, nondistended, no masses or organomegaly  Neurological - alert, oriented, normal speech, no focal findings or movement disorder noted  Musculoskeletal - no joint tenderness, deformity or swelling  Extremities - peripheral pulses normal, no pedal edema, no clubbing or cyanosis  Skin - normal coloration and turgor, no rashes, no suspicious skin lesions noted      Review of Labs and Diagnostic Testing:    Recent Results (from the past 24 hour(s))   EKG 12 Lead    Collection Time: 06/10/22  8:47 AM   Result Value Ref Range    Ventricular Rate 71 BPM    Atrial Rate 71 BPM    P-R Interval 158 ms    QRS Duration 82 ms    Q-T Interval 396 ms    QTc Calculation (Bazett) 430 ms    P Axis 37 degrees    R Axis 51 degrees    T Axis 90 degrees   Basic Metabolic Panel w/ Reflex to MG    Collection Time: 06/10/22  9:54 AM   Result Value Ref Range    Sodium 141 135 - 145 meq/L    Potassium reflex Magnesium 4.5 3.5 - 5.2 meq/L    Chloride 100 98 - 111 meq/L    CO2 25 23 - 33 meq/L    Glucose 126 (H) 70 - 108 mg/dL    BUN 24 (H) 7 - 22 mg/dL    CREATININE 1.1 0.4 - 1.2 mg/dL Calcium 9.7 8.5 - 10.5 mg/dL   Brain Natriuretic Peptide    Collection Time: 06/10/22  9:54 AM   Result Value Ref Range    Pro-.4 0.0 - 900.0 pg/mL   CBC with Auto Differential    Collection Time: 06/10/22  9:54 AM   Result Value Ref Range    WBC 5.1 4.8 - 10.8 thou/mm3    RBC 4.86 4.70 - 6.10 mill/mm3    Hemoglobin 14.5 14.0 - 18.0 gm/dl    Hematocrit 43.9 42.0 - 52.0 %    MCV 90.3 80.0 - 94.0 fL    MCH 29.8 26.0 - 33.0 pg    MCHC 33.0 32.2 - 35.5 gm/dl    RDW-CV 12.6 11.5 - 14.5 %    RDW-SD 41.4 35.0 - 45.0 fL    Platelets 643 201 - 660 thou/mm3    MPV 11.6 9.4 - 12.4 fL    Seg Neutrophils 59.8 %    Lymphocytes 29.5 %    Monocytes 7.0 %    Eosinophils 2.5 %    Basophils 1.0 %    Immature Granulocytes 0.2 %    Segs Absolute 3.0 1.8 - 7.7 thou/mm3    Lymphocytes Absolute 1.5 1.0 - 4.8 thou/mm3    Monocytes Absolute 0.4 0.4 - 1.3 thou/mm3    Eosinophils Absolute 0.1 0.0 - 0.4 thou/mm3    Basophils Absolute 0.1 0.0 - 0.1 thou/mm3    Immature Grans (Abs) 0.01 0.00 - 0.07 thou/mm3    nRBC 0 /100 wbc   Troponin    Collection Time: 06/10/22  9:54 AM   Result Value Ref Range    Troponin T < 0.010 ng/ml   Anion Gap    Collection Time: 06/10/22  9:54 AM   Result Value Ref Range    Anion Gap 16.0 8.0 - 16.0 meq/L   Osmolality    Collection Time: 06/10/22  9:54 AM   Result Value Ref Range    Osmolality Calc 286.8 275.0 - 300.0 mOsmol/kg   Glomerular Filtration Rate, Estimated    Collection Time: 06/10/22  9:54 AM   Result Value Ref Range    Est, Glom Filt Rate 66 (A) ml/min/1.73m2   EKG 12 Lead    Collection Time: 06/10/22 10:26 AM   Result Value Ref Range    Ventricular Rate 66 BPM    Atrial Rate 66 BPM    P-R Interval 166 ms    QRS Duration 94 ms    Q-T Interval 420 ms    QTc Calculation (Bazett) 440 ms    P Axis 30 degrees    R Axis 25 degrees    T Axis -156 degrees   D-Dimer, Quantitative    Collection Time: 06/10/22 11:27 AM   Result Value Ref Range    D-Dimer, Quant 436.00 0.00 - 500.00 ng/ml FEU   Troponin

## 2022-06-10 NOTE — ED NOTES
In for hourly rounding. Pt resting on cot in position of comfort. Pt remains A&Ox4, resps easy and unlabored. IV shows no s/s of infection or infiltration. Pt continues to deny pain at this time. Pt provided with decaf coffee per request and ok from physician. Monitor remains in place. Updated pt on POC. Will monitor.      Harvey Plascencia RN  06/10/22 [id number]

## 2022-06-10 NOTE — ED NOTES
Spoke with [facility] who states they are ready for patient, will transport shortly     Sabina Floyd RN  06/10/22 7162

## 2022-06-10 NOTE — ED NOTES
In to perform repeat EKG and medicate pt per [facility]. Pt resting on cot inposition of comfort. Pt remains A&Ox4, resps easy and unlabored. Dr Shaista Ng reported pt c/o 1.5/10 chest pain upon his assessment. Upon this RNs assessment, pt denies chest pain. Nitro administration HELD at this time. Repeat EKG completed. Pt assisted to restroom in room. No other concerns at this time. Will monitor.      Anna Christie RN  06/10/22 3491

## 2022-06-10 NOTE — ED NOTES
Pt arrived here at [facility] with son-in-law. Pt ambulated to window of [facility] desk and appearing very SOB and c/o chest pain. Pt immediately taken to room 4 and placed on EKG machine, CNP called into room for assessment. Pt placed on BP cuff, POx and evaluating need to go to ER.       Akiko Vital RN  06/10/22 [id number]

## 2022-06-10 NOTE — ED NOTES
In for hourly rounding. Pt resting on cot in position of comfort. Pt remains A&Ox4, resps easy and unlabored. IV shows no s/s of infection or infiltration. Pt continues to deny pain at this time. Monitor remains in place. Updated pt on POC. Will monitor.      Adalgisa Tran RN  06/10/22 1849

## 2022-06-10 NOTE — ED NOTES
In for hourly rounding. Pt resting on cot in position of comfort. Pt remains A&Ox4, resps easy and unlabored. IV shows no s/s of infection or infiltration. Pt pain remains unchanged from previous assessment. Monitor remains in place. Updated pt on POC. Will monitor.      Mellisa Oliveira RN  06/10/22 9522

## 2022-06-10 NOTE — ED PROVIDER NOTES
[address] [address]          Pt Name: Deetta Goodell  MRN: [id number]  [place] 1950  Date of evaluation: 6/10/2022  Treating Resident Physician: Barb Alvarado MD  Supervising Physician: Dr. Debby Shane       Chief Complaint   Patient presents with   Fran Rudder Chest Pain     onset [id number]    Shortness of Breath     History obtained from the patient. HISTORY OF PRESENT ILLNESS    HPI  Deetta Goodell is a 66 y.o. male with PMHx of HLD, BPH who presents to the emergency department for evaluation of chest pain and shortness of breath. Around 0830 this AM the patient was sitting at his desk at home and turned around behind him to grab papers out of his printer and then he felt sudden onset 10/10 substernal chest pain like \"someone punched me\" in the chest. It was not radiating but was associated with shortness of breath and near syncope. He states during this episode he was no diaphoretic and did not lose consciousness. He yelled for his family members at home with him who brought him to urgent care. On the car ride over, the chest pain subsided to 1/10 in severity but when the patient walked from the car to the Urgent Care  the chest pain increased back to 9/10. He was noted to have a BP of 200/101 there and was given 324mg ASA. He was transferred here to [facility] ED for evaluation. He is still complaining of 1/10 chest pain at rest. He denies diaphoresis, abdominal pain, N/V, diarrhea, leg swelling. He does endorse belching. He does not have a h/o DVTs/PEs and does not have a family history of clotting disorders. He does not some tobacco, drink EtOH, or use any other illicit drugs. The patient has no other acute complaints at this time. REVIEW OF SYSTEMS   Review of Systems   Constitutional: Negative. HENT: Negative. Eyes: Negative. Respiratory: Positive for shortness of breath. Negative for cough. Cardiovascular: Positive for chest pain. Negative for palpitations and leg swelling. Gastrointestinal: Negative for abdominal pain, constipation, diarrhea, nausea and vomiting. Endocrine: Negative. Genitourinary: Negative. Musculoskeletal: Negative. Skin: Negative. Allergic/Immunologic: Negative. Neurological: Negative for tremors, syncope, weakness and headaches. Hematological: Negative. Psychiatric/Behavioral: Negative. PAST MEDICAL AND SURGICAL HISTORY     Past Medical History:   Diagnosis Date    BPH (benign prostatic hyperplasia)     COVID-19 11/12/2020    Hepatitis B     Hyperlipidemia     Psoriasis      Past Surgical History:   Procedure Laterality Date    BACK SURGERY  2015    Herniated disk     MOUTH SURGERY  1968         MEDICATIONS     Current Facility-Administered Medications:     nitroGLYCERIN (NITROSTAT) SL tablet 0.4 mg, 0.4 mg, SubLINGual, Q5 Min PRN, Dane Perez MD    Current Outpatient Medications:     tamsulosin (FLOMAX) 0.4 MG capsule, Take 1 capsule by mouth daily, Disp: 90 capsule, Rfl: 3    zinc sulfate (ZINCATE) 220 (50 Zn) MG capsule, Take 1 capsule by mouth daily, Disp: 30 capsule, Rfl: 3    vitamin C (VITAMIN C) 1000 MG tablet, Take 1 tablet by mouth daily, Disp: 30 tablet, Rfl: 3    Vitamin D (CHOLECALCIFEROL) 50 MCG (2000 UT) TABS tablet, Take 1 tablet by mouth daily, Disp: 60 tablet, Rfl:     ketoconazole (NIZORAL) 2 % shampoo, Apply topically daily as needed for Itching Apply topically daily as needed. , Disp: , Rfl:     Multiple Vitamins-Minerals (MULTIVITAMIN-MINERALS) TABS tablet, Take 1 tablet by mouth daily, Disp: , Rfl:     aspirin EC 81 MG EC tablet, Take 81 mg by mouth daily, Disp: , Rfl:       SOCIAL HISTORY     Social History     Social History Narrative    Not on file     Social History     Tobacco Use    Smoking status: Never Smoker    Smokeless tobacco: Never Used    Tobacco comment: As a child my parents were heavy smokers   Substance Use Topics    Alcohol use: Never    Drug use: No         ALLERGIES     Allergies   Allergen Reactions    Crestor [Rosuvastatin Calcium]     Benadryl [Diphenhydramine] Other (See Comments)     Jitter, hyperactivity if takes more than one day          FAMILY HISTORY     Family History   Problem Relation Age of Onset    High Cholesterol Mother     Emphysema Father     Asthma Father     Cancer Sister     Cancer Brother          PREVIOUS RECORDS   Previous records reviewed: I reviewed the patient's past medical records including relevant labs, imaging and procedures. PHYSICAL EXAM     ED Triage Vitals   BP Temp Temp Source Heart Rate Resp SpO2 Height Weight   06/10/22 0847 06/10/22 0900 06/10/22 0900 06/10/22 0847 06/10/22 0847 06/10/22 0847 06/10/22 0937 06/10/22 0937   (!) 200/101 98.1 °F (36.7 °C) Temporal 81 22 100 % 5' 10\" (1.778 m) 183 lb (83 kg)     Initial vital signs and nursing assessment reviewed and abnormal from hypertension, tachypnea. Body mass index is 26.26 kg/m². Pulsoximetry is normal per my interpretation. Additional Vital Signs:  Vitals:    06/10/22 1256   BP: (!) 164/83   Pulse: 54   Resp: 18   Temp:    SpO2: 99%       Physical Exam  Vitals reviewed. Constitutional:       General: He is not in acute distress. Appearance: He is well-developed. He is not ill-appearing or diaphoretic. HENT:      Head: Normocephalic and atraumatic. Right Ear: External ear normal.      Left Ear: External ear normal.      Nose: Nose normal.   Eyes:      Extraocular Movements: Extraocular movements intact. Conjunctiva/sclera: Conjunctivae normal.   Cardiovascular:      Rate and Rhythm: Normal rate and regular rhythm. Pulses:           Radial pulses are 2+ on the right side and 2+ on the left side. Dorsalis pedis pulses are 2+ on the right side and 2+ on the left side.       Heart sounds: Normal heart sounds, S1 normal and S2 normal. No murmur heard.      Pulmonary:      Effort: Pulmonary effort is normal.      Breath sounds: No wheezing, rhonchi or rales. Abdominal:      General: Abdomen is flat. Palpations: Abdomen is soft. Tenderness: There is no abdominal tenderness. There is no guarding or rebound. Musculoskeletal:      Cervical back: Normal range of motion. Right lower leg: No edema. Left lower leg: No edema. Skin:     General: Skin is warm and dry. Capillary Refill: Capillary refill takes less than 2 seconds. Neurological:      General: No focal deficit present. Mental Status: He is alert and oriented to person, place, and time. Mental status is at baseline. Cranial Nerves: No cranial nerve deficit. Psychiatric:         Mood and Affect: Mood normal.         Behavior: Behavior normal.             MEDICAL DECISION MAKING   Initial Assessment: This is a 76-year-old male with past medical history of hyperlipidemia, BPH coming in with acute onset substernal chest pain associated with shortness of breath and near syncope at 0 830 this morning. Patient presented to urgent care had a BP of 200/101 given 324 mg of aspirin coming in with persistent chest pain. Appears to be related to exertion. Also endorsing belching. On exam no diaphoresis, no acute distress, bilateral equal distal pulses are present no murmurs on cardiac auscultation, not tachycardic, lungs clear to auscultation bilaterally. No pedal edema.       Differential diagnosis includes but is not limited to:  Emergent: ACS/NSTEMI/STEMI/angina, arrhythmia, trauma, aortic dissection,  PE, PNA, pneumothroax, esophageal rupture, tamponade, Cocaine use  Nonemergent: pneumonia, pericarditis, GERD, MSK, Endocarditis, anxiety     Evaluate for: diaphoresis, present chest pain, tachypnea, BP both arms, heart sounds, JVD, tender chest wall, wheezing    Although some of these diagnoses are unlikely they were considered in my medical decision making. Plan:    ACS w/u  Nitro offered  Serial troponin  Labs        ED RESULTS   Laboratory results:  Labs Reviewed   BASIC METABOLIC PANEL W/ REFLEX TO MG FOR LOW K - Abnormal; Notable for the following components:       Result Value    Glucose 126 (*)     BUN 24 (*)     All other components within normal limits   GLOMERULAR FILTRATION RATE, ESTIMATED - Abnormal; Notable for the following components:    Est, Glom Filt Rate 66 (*)     All other components within normal limits   BRAIN NATRIURETIC PEPTIDE   CBC WITH AUTO DIFFERENTIAL   TROPONIN   ANION GAP   OSMOLALITY   D-DIMER, QUANTITATIVE   TROPONIN       Radiologic studies results:  XR CHEST PORTABLE   Final Result   1. No acute cardiopulmonary finding. **This report has been created using voice recognition software. It may contain minor errors which are inherent in voice recognition technology. **      Final report electronically signed by Dr Aldo Curran on 6/10/2022 9:58 AM          ED Medications administered this visit:   Medications   nitroGLYCERIN (NITROSTAT) SL tablet 0.4 mg (has no administration in time range)   aspirin chewable tablet 324 mg (324 mg Oral Given 6/10/22 0856)         ED COURSE     ED Course as of 06/10/22 1355   Fri Gee 10, 2022   1035 EKG 12 Lead  Initial EKG today at 0847 showing sinus rhythm without axis deviation. Frequent PVCs are appreciated. No ST elevations or depressions are seen. Intervals are within normal limits. Nonspecific ST and T wave abnormalities present in leads V4 through V6, V1 [JT]   1059 Patient stating he is no longer having chest pain at this point. No nitroglycerin given. [JT]   1249 I did discuss the results with the patient so far. Recommending admission for chest pain and patient is amenable to this plan of care.  [JT]      ED Course User Index  [JT] Maurice Acuna MD         MDM:   Patient having acute chest pain that seems to be related to exertion also with nonspecific ST, T wave abnormalities in the precordial leads. Troponin is negative. Repeat troponin pending at this time. However, this is new and severe chest pain that is likely anginal in nature. 324 aspirin given at urgent care. No nitroglycerin given here as patient is not having active chest pain at time of offering nitro. Given heart score of 5 and patient does not have a cardiologist will admit patient for cardiac observation and possible ischemic work-up. Hospitalist graciously accepted the admission. Heart Score    Heart Score for chest pain patients  History: Moderately Suspicious  ECG: Non-Specifc repolarization disturbance/LBTB/PM  Patient Age: > 65 years  *Risk factors for Atherosclerotic disease: Hypercholesterolemia  Risk Factors: 1 or 2 risk factors  Troponin: < 1X normal limit  Heart Score Total: 5    HEART Score recommendations:  Score 0  3:   <1.7%  risk of MACE over next 6 wks = Outpatient workup  Score 4  6: 12-16% risk of MACE over next 6 wks = Admission for observation  Score 7- 10: 50-65% risk of MACE over next 6 wks = Early invasive strategy    MACE: Major Acute Cardiac Event (All Cause Mortality, AMI or revascularization)  Chest Pain in the Emergency Room: A Multicenter Validation of the [address] [place] BE, Omi AJ, Kirsten ABBIE, Mast TP, [employer], Mast EG, Amilcar SH, [facility]. Crit Pathw Cardiol. 2010 Sep; 9(3): 164-169. \"A prospective validation of the HEART score for chest pain patients at the emergency department. \" Int J Cardiol. 2013 Oct 3;168(3):2153-8. doi: 10.1016/j.ijcard. 2013.01.255. Epub 2013 Mar 7. MEDICATION CHANGES     Current Discharge Medication List            FINAL DISPOSITION     Final diagnoses:   Acute chest pain     Condition: condition: fair  Dispo: Admit to med/surg floor      This transcription was electronically signed.  Parts of this transcriptions may have been dictated by use of voice recognition software and electronically transcribed, and parts may have been transcribed with the assistance of an ED scribe. The transcription may contain errors not detected in proofreading. Please refer to my supervising physician's documentation if my documentation differs.     Electronically Signed: Dane Perez MD, 06/10/22, 1:55 PM         Dane Perez MD  Resident  06/10/22 [phone]

## 2022-06-10 NOTE — ED NOTES
Pt presents to ED via [facility] as a transfer from [facility] for sudden onset of chest pain and shortness of breath. Pt has no cardiac or pulmonary history. Upon initial assessment, pt is A&Ox4, resps slightly labored. Able to  pt to slow breathing with ease. At time of assessment, pt is pain free. Reports slight shortness of breath. Pt appears very anxious with no history of anxiety. Pt presents with 22g INT in R AC. Monitor applied and VS as noted. Will monitor.      Gretel Lundborg, RN  06/10/22 7319

## 2022-06-10 NOTE — ED NOTES
Importance of daily AREDS II study multivitamin and Amsler Grid checks discussed with patient. Patient to follow-up immediately with any new onset of decreased vision and/or metamorphopsia. [facility] arrived and pt report given to medic. Pt loaded onto stretcher and taken to squad for transfer to [facility] ER for further care.       Jhon Moody RN  06/10/22 6945

## 2022-06-11 ENCOUNTER — APPOINTMENT (OUTPATIENT)
Dept: CT IMAGING | Age: 72
DRG: 313 | End: 2022-06-11
Payer: MEDICARE

## 2022-06-11 LAB
ALBUMIN SERPL-MCNC: 4 G/DL (ref 3.5–5.1)
ALP BLD-CCNC: 94 U/L (ref 38–126)
ALT SERPL-CCNC: 26 U/L (ref 11–66)
ANION GAP SERPL CALCULATED.3IONS-SCNC: 12 MEQ/L (ref 8–16)
AST SERPL-CCNC: 18 U/L (ref 5–40)
BASOPHILS # BLD: 0.9 %
BASOPHILS ABSOLUTE: 0.1 THOU/MM3 (ref 0–0.1)
BILIRUB SERPL-MCNC: 0.2 MG/DL (ref 0.3–1.2)
BILIRUBIN DIRECT: < 0.2 MG/DL (ref 0–0.3)
BUN BLDV-MCNC: 26 MG/DL (ref 7–22)
CALCIUM SERPL-MCNC: 9 MG/DL (ref 8.5–10.5)
CHLORIDE BLD-SCNC: 103 MEQ/L (ref 98–111)
CHOLESTEROL, TOTAL: 272 MG/DL (ref 100–199)
CO2: 26 MEQ/L (ref 23–33)
CREAT SERPL-MCNC: 1.1 MG/DL (ref 0.4–1.2)
EKG ATRIAL RATE: 57 BPM
EKG P AXIS: 38 DEGREES
EKG P-R INTERVAL: 162 MS
EKG Q-T INTERVAL: 462 MS
EKG QRS DURATION: 84 MS
EKG QTC CALCULATION (BAZETT): 449 MS
EKG R AXIS: 9 DEGREES
EKG T AXIS: 77 DEGREES
EKG VENTRICULAR RATE: 57 BPM
EOSINOPHIL # BLD: 2.8 %
EOSINOPHILS ABSOLUTE: 0.2 THOU/MM3 (ref 0–0.4)
ERYTHROCYTE [DISTWIDTH] IN BLOOD BY AUTOMATED COUNT: 12.8 % (ref 11.5–14.5)
ERYTHROCYTE [DISTWIDTH] IN BLOOD BY AUTOMATED COUNT: 42.1 FL (ref 35–45)
GFR SERPL CREATININE-BSD FRML MDRD: 66 ML/MIN/1.73M2
GLUCOSE BLD-MCNC: 108 MG/DL (ref 70–108)
HCT VFR BLD CALC: 41.4 % (ref 42–52)
HDLC SERPL-MCNC: 27 MG/DL
HEMOGLOBIN: 13.7 GM/DL (ref 14–18)
IMMATURE GRANS (ABS): 0.02 THOU/MM3 (ref 0–0.07)
IMMATURE GRANULOCYTES: 0.3 %
LDL CHOLESTEROL CALCULATED: ABNORMAL MG/DL
LYMPHOCYTES # BLD: 30 %
LYMPHOCYTES ABSOLUTE: 2 THOU/MM3 (ref 1–4.8)
MCH RBC QN AUTO: 30.1 PG (ref 26–33)
MCHC RBC AUTO-ENTMCNC: 33.1 GM/DL (ref 32.2–35.5)
MCV RBC AUTO: 91 FL (ref 80–94)
MONOCYTES # BLD: 9.7 %
MONOCYTES ABSOLUTE: 0.6 THOU/MM3 (ref 0.4–1.3)
NUCLEATED RED BLOOD CELLS: 0 /100 WBC
PLATELET # BLD: 153 THOU/MM3 (ref 130–400)
PMV BLD AUTO: 11.5 FL (ref 9.4–12.4)
POTASSIUM REFLEX MAGNESIUM: 3.9 MEQ/L (ref 3.5–5.2)
RBC # BLD: 4.55 MILL/MM3 (ref 4.7–6.1)
SEG NEUTROPHILS: 56.3 %
SEGMENTED NEUTROPHILS ABSOLUTE COUNT: 3.7 THOU/MM3 (ref 1.8–7.7)
SODIUM BLD-SCNC: 141 MEQ/L (ref 135–145)
TOTAL PROTEIN: 6.1 G/DL (ref 6.1–8)
TRIGL SERPL-MCNC: 600 MG/DL (ref 0–199)
WBC # BLD: 6.5 THOU/MM3 (ref 4.8–10.8)

## 2022-06-11 PROCEDURE — 6360000004 HC RX CONTRAST MEDICATION: Performed by: NUCLEAR MEDICINE

## 2022-06-11 PROCEDURE — 6370000000 HC RX 637 (ALT 250 FOR IP): Performed by: NURSE PRACTITIONER

## 2022-06-11 PROCEDURE — 99232 SBSQ HOSP IP/OBS MODERATE 35: CPT | Performed by: NURSE PRACTITIONER

## 2022-06-11 PROCEDURE — 1200000003 HC TELEMETRY R&B

## 2022-06-11 PROCEDURE — 93010 ELECTROCARDIOGRAM REPORT: CPT | Performed by: INTERNAL MEDICINE

## 2022-06-11 PROCEDURE — 80076 HEPATIC FUNCTION PANEL: CPT

## 2022-06-11 PROCEDURE — 71275 CT ANGIOGRAPHY CHEST: CPT

## 2022-06-11 PROCEDURE — 2580000003 HC RX 258: Performed by: NURSE PRACTITIONER

## 2022-06-11 PROCEDURE — 93005 ELECTROCARDIOGRAM TRACING: CPT | Performed by: NURSE PRACTITIONER

## 2022-06-11 PROCEDURE — 6370000000 HC RX 637 (ALT 250 FOR IP): Performed by: INTERNAL MEDICINE

## 2022-06-11 PROCEDURE — 85025 COMPLETE CBC W/AUTO DIFF WBC: CPT

## 2022-06-11 PROCEDURE — 80048 BASIC METABOLIC PNL TOTAL CA: CPT

## 2022-06-11 PROCEDURE — 99223 1ST HOSP IP/OBS HIGH 75: CPT | Performed by: INTERNAL MEDICINE

## 2022-06-11 PROCEDURE — 6360000002 HC RX W HCPCS: Performed by: NURSE PRACTITIONER

## 2022-06-11 PROCEDURE — 80061 LIPID PANEL: CPT

## 2022-06-11 PROCEDURE — 36415 COLL VENOUS BLD VENIPUNCTURE: CPT

## 2022-06-11 RX ORDER — ATORVASTATIN CALCIUM 20 MG/1
20 TABLET, FILM COATED ORAL NIGHTLY
Status: DISCONTINUED | OUTPATIENT
Start: 2022-06-11 | End: 2022-06-12 | Stop reason: HOSPADM

## 2022-06-11 RX ORDER — AMLODIPINE BESYLATE 2.5 MG/1
2.5 TABLET ORAL DAILY
Status: DISCONTINUED | OUTPATIENT
Start: 2022-06-11 | End: 2022-06-12 | Stop reason: HOSPADM

## 2022-06-11 RX ORDER — ATORVASTATIN CALCIUM 20 MG/1
20 TABLET, FILM COATED ORAL NIGHTLY
Qty: 30 TABLET | Refills: 0 | Status: SHIPPED | OUTPATIENT
Start: 2022-06-11 | End: 2022-06-23 | Stop reason: SDUPTHER

## 2022-06-11 RX ADMIN — ACETAMINOPHEN 650 MG: 325 TABLET ORAL at 23:53

## 2022-06-11 RX ADMIN — ATORVASTATIN CALCIUM 20 MG: 20 TABLET, FILM COATED ORAL at 20:37

## 2022-06-11 RX ADMIN — Medication 2000 UNITS: at 08:03

## 2022-06-11 RX ADMIN — HYDRALAZINE HYDROCHLORIDE 10 MG: 20 INJECTION, SOLUTION INTRAMUSCULAR; INTRAVENOUS at 22:38

## 2022-06-11 RX ADMIN — AMLODIPINE BESYLATE 2.5 MG: 2.5 TABLET ORAL at 20:37

## 2022-06-11 RX ADMIN — HYDRALAZINE HYDROCHLORIDE 10 MG: 20 INJECTION, SOLUTION INTRAMUSCULAR; INTRAVENOUS at 16:24

## 2022-06-11 RX ADMIN — ASPIRIN 81 MG: 81 TABLET, COATED ORAL at 08:03

## 2022-06-11 RX ADMIN — IOPAMIDOL 80 ML: 755 INJECTION, SOLUTION INTRAVENOUS at 22:04

## 2022-06-11 RX ADMIN — SODIUM CHLORIDE, PRESERVATIVE FREE 10 ML: 5 INJECTION INTRAVENOUS at 08:04

## 2022-06-11 RX ADMIN — Medication 50 MG: at 08:03

## 2022-06-11 RX ADMIN — OXYCODONE HYDROCHLORIDE AND ACETAMINOPHEN 1000 MG: 500 TABLET ORAL at 08:03

## 2022-06-11 RX ADMIN — TAMSULOSIN HYDROCHLORIDE 0.4 MG: 0.4 CAPSULE ORAL at 08:03

## 2022-06-11 RX ADMIN — ENOXAPARIN SODIUM 40 MG: 100 INJECTION SUBCUTANEOUS at 20:37

## 2022-06-11 RX ADMIN — Medication 1 TABLET: at 08:03

## 2022-06-11 RX ADMIN — SODIUM CHLORIDE, PRESERVATIVE FREE 10 ML: 5 INJECTION INTRAVENOUS at 20:37

## 2022-06-11 ASSESSMENT — PAIN DESCRIPTION - DESCRIPTORS: DESCRIPTORS: ACHING

## 2022-06-11 ASSESSMENT — PAIN DESCRIPTION - LOCATION: LOCATION: HEAD

## 2022-06-11 ASSESSMENT — PAIN SCALES - GENERAL
PAINLEVEL_OUTOF10: 0
PAINLEVEL_OUTOF10: 0
PAINLEVEL_OUTOF10: 6

## 2022-06-11 NOTE — CONSULTS
[facility]'s  Cardiology Consult      Patient:  Kaelyn Montgomery  YOB: 1950    MRN: [id number]   Acct: [de-identified]     Primary Care Physician: Brianne Castro MD    REASON FOR CONSULT:    chest pain      CHIEF COMPLAINT:    Chest pain     HISTORY OF PRESENT ILLNESS:    Kaelyn Montgomery is a pleasant 66year old male patient with past medical history that includes:   Past Medical History:   Diagnosis Date    BPH (benign prostatic hyperplasia)     COVID-19 2020    Hepatitis B     Hyperlipidemia     Psoriasis    He has h/o uncontrolled hypertension. Denies smoking. He denies h/o cardiac disease. His brother  from MI at age 39. The patient was admitted to the hospital yesterday after he presented to [facility] ER with chest pain and shortness of breath. Chest pain was stabbing, retrosternal, 10/10, occurred at rest, non-radiating. He had associated nausea and dizziness. The pain occurred when he was sitting in the chair at his desk. Chest pain lasted for 5-10 minutes, gradually resolved. He had no recurrence of chest pain. He feels well and reports that he is back to normal baseline status now. Patient walked in his room today, no issues reported. In ER, his blood pressure was 100/101. CXR revealed no acute findings. D-dimer was 436. Troponin <0.01, <0.01, <0.01. EKG yesterday revealed SR, nonspecific ST depressions and TW changes in inferolateral leads. PVCs were noted. EKG today is c/w sinus bradycardia. Patient denies shortness of breath, dyspnea on exertion, orthopnea, paroxysmal nocturnal dyspnea, palpitations, recent weight gain or leg swelling.      All labs, EKG's, diagnostic testing and images as well as cardiac cath, stress testing   were reviewed during this encounter    CARDIAC TESTING  Reviewed     Past Medical History:    Past Medical History:   Diagnosis Date    BPH (benign prostatic hyperplasia)     COVID-19 2020    Hepatitis B     Hyperlipidemia  Psoriasis        Past Surgical History:    Past Surgical History:   Procedure Laterality Date    BACK SURGERY  2015    Herniated disk     MOUTH SURGERY  1968       Medications Prior to Admission:    Medications Prior to Admission: hydrOXYzine HCl (ATARAX) 10 MG tablet, Take by mouth daily as needed  triamcinolone (KENALOG) 0.1 % cream, Apply topically Daily  Melatonin 10 MG TABS, Take by mouth daily as needed  tamsulosin (FLOMAX) 0.4 MG capsule, Take 1 capsule by mouth daily  zinc sulfate (ZINCATE) 220 (50 Zn) MG capsule, Take 1 capsule by mouth daily  vitamin C (VITAMIN C) 1000 MG tablet, Take 1 tablet by mouth daily  Vitamin D (CHOLECALCIFEROL) 50 MCG (2000 UT) TABS tablet, Take 1 tablet by mouth daily  ketoconazole (NIZORAL) 2 % shampoo, Apply topically daily as needed for Itching Apply topically daily as needed. Multiple Vitamins-Minerals (MULTIVITAMIN-MINERALS) TABS tablet, Take 1 tablet by mouth daily  aspirin EC 81 MG EC tablet, Take 81 mg by mouth daily    Allergies:    Crestor [rosuvastatin calcium] and Benadryl [diphenhydramine]    Social History:    reports that he has never smoked. He has never used smokeless tobacco. He reports that he does not drink alcohol and does not use drugs. Family History:   family history includes Asthma in his father; Cancer in his brother and sister; Emphysema in his father; High Cholesterol in his mother. REVIEW OF SYSTEMS:  Constitutional: negative for anorexia, chills and fevers,weight change  Skin: negative for new skin rash per patient  HEENT: negative for head trauma or new visual changes  Respiratory: negative for cough, hemoptysis, wheezing  Cardiovascular: negative for  orthopnea, palpitations and syncope. Gastrointestinal: negative for abdominal pain,nausea , vomiting, constipation, diarrhea.   Hematologic/lymphatic: negative for bruising,prolonged bleeding,blood clots  Musculoskeletal:negative for muscle weakness, myalgias,wasting  Neurological: negative for coordination problems, dizziness, gait problems and vertigo  Behavioral/Psych:negative for mood/sleep disturbance      PHYSICAL EXAM:   Vitals:  Patient Vitals for the past 24 hrs:   BP Temp Temp src Pulse Resp SpO2   06/11/22 1533 (!) 168/94   65     06/11/22 1506 (!) 165/87 97.5 °F (36.4 °C) Oral 65 16 99 %   06/11/22 1051 124/77 98.1 °F (36.7 °C) Oral 70 16 96 %   06/11/22 0710 139/85 98.1 °F (36.7 °C) Oral 58 16 98 %   06/11/22 0348 123/76 97.5 °F (36.4 °C) Oral 57 16 97 %   06/10/22 2333 127/68 97.5 °F (36.4 °C) Oral 65 18 96 %   06/10/22 2100 (!) 153/78 98 °F (36.7 °C) Oral 72 16 97 %   06/10/22 1849 (!) 193/89   56 16 100 %   06/10/22 1823     18        Last 3 weights: Wt Readings from Last 3 Encounters:   06/10/22 183 lb (83 kg)   10/08/21 187 lb (84.8 kg)   09/07/21 185 lb 6.4 oz (84.1 kg)     24 hour intake/output:No intake or output data in the 24 hours ending 06/11/22 1641  BMI:Body mass index is 26.26 kg/m². General Appearance: alert and oriented to person, place and time, well developed and well- nourished, in no acute distress  Skin: warm and dry, no rash or erythema  Eyes: pupils equal, round, and reactive to light, extraocular eye movements intact, conjunctivae normal  Neck: supple and non-tender without mass, no thyromegaly or thyroid nodules, no cervical lymphadenopathy  Pulmonary/Chest: clear to auscultation bilaterally- no wheezes, rales or rhonchi, normal air movement, no respiratory distress  Cardiovascular: normal rate, regular rhythm, normal S1 and S2, systolic murmur. No rubs, clicks, or gallops, distal pulses intact, no carotid bruits, Negative JVD  Radial Pulses: intact 2+  Abdomen: soft, non-tender, non-distended, normal bowel sounds, no masses or organomegaly  Extremities: no cyanosis, clubbing .  no Edema  Musculoskeletal: normal range of motion, no joint swelling, deformity or tenderness      RADIOLOGY   XR CHEST PORTABLE    Result Date: 6/10/2022  PROCEDURE: XR CHEST PORTABLE CLINICAL INFORMATION: Chest pain COMPARISON: Chest radiograph 11/17/2020 TECHNIQUE: AP portable chest radiograph performed. FINDINGS: No focal pulmonary consolidation. Cardiac silhouette is not enlarged. No pleural effusion. No pneumothorax. No acute bony abnormality. Plate and screw fixation seen in the lower cervical spine     1. No acute cardiopulmonary finding. **This report has been created using voice recognition software. It may contain minor errors which are inherent in voice recognition technology. ** Final report electronically signed by Dr Tabatha Burnett on 6/10/2022 9:58 AM      LABS:  Recent Labs     06/10/22  0954 06/10/22  1312 06/10/22  2055   TROPONINT < 0.010 < 0.010 < 0.010     CBC:   Lab Results   Component Value Date    WBC 6.5 06/11/2022    RBC 4.55 06/11/2022    HGB 13.7 06/11/2022    HCT 41.4 06/11/2022    MCV 91.0 06/11/2022    MCH 30.1 06/11/2022    MCHC 33.1 06/11/2022     06/11/2022    MPV 11.5 06/11/2022     BMP:    Lab Results   Component Value Date     06/11/2022    K 3.9 06/11/2022     06/11/2022    CO2 26 06/11/2022    BUN 26 06/11/2022    LABALBU 4.0 06/11/2022    CREATININE 1.1 06/11/2022    CALCIUM 9.0 06/11/2022    LABGLOM 66 06/11/2022    GLUCOSE 108 06/11/2022     Hepatic Function Panel:    Lab Results   Component Value Date    ALKPHOS 94 06/11/2022    ALT 26 06/11/2022    AST 18 06/11/2022    PROT 6.1 06/11/2022    BILITOT 0.2 06/11/2022    BILIDIR <0.2 06/11/2022    LABALBU 4.0 06/11/2022     Magnesium:  No results found for: MG  Warfarin PT/INR:  No components found for: PTPATWAR, PTINRWAR  HgBA1c:    Lab Results   Component Value Date    LABA1C 5.9 09/08/2021     FLP:    Lab Results   Component Value Date    TRIG 600 06/11/2022    HDL 27 06/11/2022    [address] SEE BELOW 06/11/2022     TSH:    Lab Results   Component Value Date    TSH 0.390 11/23/2020     BNP: No results found for: BNP      ASSESSMENT:  Josh Millan is a pleasant 66year old male patient with past medical history that includes:   Past Medical History:   Diagnosis Date    BPH (benign prostatic hyperplasia)     COVID-19 2020    Hepatitis B     Hyperlipidemia     Psoriasis    He has h/o uncontrolled hypertension. Denies smoking. He denies h/o cardiac disease. His brother  from MI at age 39. The patient was admitted to the hospital yesterday after he presented to [facility] ER with chest pain and shortness of breath. Chest pain was stabbing, retrosternal, 10/10, occurred at rest, non-radiating. He had associated nausea and dizziness. The pain occurred when he was sitting in the chair at his desk. Chest pain lasted for 5-10 minutes, gradually resolved. He had no recurrence of chest pain. He feels well and reports that he is back to normal baseline status now. Patient walked in his room today, no issues reported. In ER, his blood pressure was 100/101. CXR revealed no acute findings. D-dimer was 436. Troponin <0.01, <0.01, <0.01. EKG yesterday revealed SR, nonspecific ST depressions and TW changes in inferolateral leads. PVCs were noted. EKG today is c/w sinus bradycardia. Patient denies shortness of breath, dyspnea on exertion, orthopnea, paroxysmal nocturnal dyspnea, palpitations, recent weight gain or leg swelling. 1. Chest pain   2. Abnormal EKG  3. Premature ventricular complexes   4. FH of premature CAD   5. Dyslipidemia   6. Hypertensive urgency   7. Transient dizziness     RECOMMENDATIONS:   Acute coronary syndrome was ruled out   Troponin * 3 sets remained negative, <0.01    Chest pain with mixed features, has not recurred since the event he had yesterday.  He feels well now    PVCs noted   On telemetry    Will benefit from cardiac event monitor on discharge    In ER, his blood pressure was 100/101   Chest CTA to rule out aortopathy    Needs better blood pressure control    Add Norvasc 2.5 mg po daily   Monitor BP and adjust medications as needed to achieve adequate BP control    Cont ASA 81 mg po daily   Cont Lipitor    Patient needs further cardiac wok up with an Echocardiogram and stress test. He prefers to complete the work up as outpatient. If he remains hospitalized, can do the stress test/Echocardiogram on Monday, otherwise those studies can be done as outpatient. Patient was advised to report to the ER if has recurrent symptoms with specific instructions given about severity and duration of symptoms    Above findings and plan of care were discussed with patient, questions were answered, agreeable to plan. Thank you for allowing me to participate in the care of this patient. Please let me know if I can be of any further assistance.       Calos Briones MD, Josh López   4:41 PM  6/11/2022

## 2022-06-11 NOTE — PROGRESS NOTES
Hospitalist Progress Note    Patient:  Josh Millan      Unit/Bed:6K-08/008-A    YOB: 1950    MRN: [id number]       Acct: [de-identified]     PCP: Karsten Garcia MD    Date of Admission: 6/10/2022    Assessment/Plan:      1. Chest pain, resolved. Possibly GI related. ACS ruled out. Echocardiogram today. Consult to cardiology for possible stress test on Saturday vs OP. 2. Mild bradycardia. Asymptomatic. HR high 50's. Not taking any AV blocking medications. Maintain tele. 3. Mixed hyperlipidemia. Reports allergy to Crestor, does not recall symptoms. Believes he tolerated Lipitor. Will restart at low dose with OP titration to max dose he can tolerate. 4. Near syncope, likely due to anxiety / hyperventilation. 5. Accelerated hypertension, resolved. Monitor BP. S/P IV Hydralazine x 1. Continue PRN. 6. Elevated LFTs secondary to COVID 19. Resolved. 7. BPH. Flomax    Dispo: await cardiology recs. Chief Complaint: CP    Hospital Course: The patient is a 66 y.o. male who presents with chest pain. Patient reports being in his normal state of health. Around 0830 he was typing on the computer, went to print a paper out. Developed acute onset substernal chest pain. Felt like \"someone punched me\". Took his breath away. Felt dizzy and anxious. Pain did not radiate. No nausea. He was able to walk up the stairs. Son drove him to urgent care. On arrival CP was subsiding. He was sent here for evaluation. Pain was relieved by itself with no intervention. No recurrent pain since here. It is of note patient does report for the last month having increased indigestion symptom including burping. This has martha relieved with Pepcid a couple times per week. Subjective: No recurrent CP since admission. No n/v/d. Feels back to his baseline.       Medications:  Reviewed    Infusion Medications    sodium chloride       Scheduled Medications    aspirin EC  81 mg Oral Daily    multivitamin  1 tablet Oral Daily    tamsulosin  0.4 mg Oral Daily    ascorbic acid  1,000 mg Oral Daily    Vitamin D  2,000 Units Oral Daily    zinc sulfate  50 mg Oral Daily    sodium chloride flush  5-40 mL IntraVENous 2 times per day    enoxaparin  40 mg SubCUTAneous Q24H     PRN Meds: nitroGLYCERIN, sodium chloride flush, sodium chloride, ondansetron **OR** ondansetron, polyethylene glycol, acetaminophen **OR** acetaminophen, hydrALAZINE    No intake or output data in the 24 hours ending 06/11/22 1256    Diet:  ADULT DIET; Regular; Low Sodium (2 gm)    Exam:  /77   Pulse 70   Temp 98.1 °F (36.7 °C) (Oral)   Resp 16   Ht 5' 10\" (1.778 m)   Wt 183 lb (83 kg)   SpO2 96%   BMI 26.26 kg/m²     General appearance: No apparent distress, appears stated age and cooperative. HEENT: Pupils equal, round, and reactive to light. Conjunctivae/corneas clear. Neck: Supple, with full range of motion. No jugular venous distention. Trachea midline. Respiratory:  Normal respiratory effort. Clear to auscultation, bilaterally without Rales/Wheezes/Rhonchi. Cardiovascular: Regular rate and rhythm with normal S1/S2 without murmurs, rubs or gallops. Abdomen: Soft, non-tender, non-distended with normal bowel sounds. Musculoskeletal: passive and active ROM x 4 extremities. Skin: Skin color, texture, turgor normal.  No rashes or lesions. Neurologic:  Neurovascularly intact without any focal sensory/motor deficits.  Cranial nerves: II-XII intact, grossly non-focal.  Psychiatric: Alert and oriented, thought content appropriate, normal insight  Capillary Refill: Brisk,< 3 seconds   Peripheral Pulses: +2 palpable, equal bilaterally       Labs:   Recent Labs     06/10/22  0954 06/11/22  0336   WBC 5.1 6.5   HGB 14.5 13.7*   HCT 43.9 41.4*    153     Recent Labs     06/10/22  0954 06/11/22  0336    141   K 4.5 3.9    103   CO2 25 26   BUN 24* 26*   CREATININE 1.1 1.1   CALCIUM 9.7 9.0     Recent Labs     06/11/22  0336   AST 18 ALT 26   BILIDIR <0.2   BILITOT 0.2*   ALKPHOS 94     No results for input(s): INR in the last 72 hours. No results for input(s): Denys Snowball in the last 72 hours. Urinalysis:      Lab Results   Component Value Date    NITRU Negative 10/08/2021    WBCUA 2-4 11/17/2020    BACTERIA NONE SEEN 11/17/2020    RBCUA 0-2 11/17/2020    BLOODU Negative 10/08/2021    BLOODU NEGATIVE 11/17/2020    SPECGRAV 1.020 11/17/2020    GLUCOSEU Negative 10/08/2021       Radiology:  XR CHEST PORTABLE   Final Result   1. No acute cardiopulmonary finding. **This report has been created using voice recognition software. It may contain minor errors which are inherent in voice recognition technology. **      Final report electronically signed by Dr Johnnie Wolf on 6/10/2022 9:58 AM          Diet: ADULT DIET; Regular;  Low Sodium (2 gm)    DVT prophylaxis: [x] Lovenox                                 [] SCDs                                 [] SQ Heparin                                 [] Encourage ambulation           [] Already on Anticoagulation     Disposition:    [x] Home       [] TCU       [] Rehab       [] Psych       [] SNF       [] [facility]       [] Other-    Code Status: Full Code            Electronically signed by PRAMOD Horton CNP on 6/11/2022 at 12:56 PM

## 2022-06-11 NOTE — PLAN OF CARE
Problem: Discharge Planning  Goal: Discharge to home or other facility with appropriate resources  6/11/2022 1039 by Alisson Preston RN  Outcome: Progressing     Problem: Chronic Conditions and Co-morbidities  Goal: Patient's chronic conditions and co-morbidity symptoms are monitored and maintained or improved  6/11/2022 1039 by Alisson Preston RN  Outcome: Progressing     Problem: Pain  Goal: Verbalizes/displays adequate comfort level or baseline comfort level  6/11/2022 1039 by Alisson Preston RN  Outcome: Progressing     Problem: Safety - Adult  Goal: Free from fall injury  6/11/2022 1039 by Alisson Preston RN  Outcome: Progressing

## 2022-06-12 VITALS
HEART RATE: 78 BPM | OXYGEN SATURATION: 100 % | DIASTOLIC BLOOD PRESSURE: 83 MMHG | HEIGHT: 70 IN | RESPIRATION RATE: 16 BRPM | WEIGHT: 183 LBS | TEMPERATURE: 97.5 F | SYSTOLIC BLOOD PRESSURE: 127 MMHG | BODY MASS INDEX: 26.2 KG/M2

## 2022-06-12 PROCEDURE — 6370000000 HC RX 637 (ALT 250 FOR IP): Performed by: INTERNAL MEDICINE

## 2022-06-12 PROCEDURE — 2580000003 HC RX 258: Performed by: NURSE PRACTITIONER

## 2022-06-12 PROCEDURE — 6370000000 HC RX 637 (ALT 250 FOR IP): Performed by: NURSE PRACTITIONER

## 2022-06-12 PROCEDURE — 99239 HOSP IP/OBS DSCHRG MGMT >30: CPT | Performed by: NURSE PRACTITIONER

## 2022-06-12 RX ORDER — AMLODIPINE BESYLATE 2.5 MG/1
2.5 TABLET ORAL DAILY
Qty: 30 TABLET | Refills: 0 | Status: SHIPPED | OUTPATIENT
Start: 2022-06-13 | End: 2022-06-23 | Stop reason: SDUPTHER

## 2022-06-12 RX ADMIN — ASPIRIN 81 MG: 81 TABLET, COATED ORAL at 08:07

## 2022-06-12 RX ADMIN — OXYCODONE HYDROCHLORIDE AND ACETAMINOPHEN 1000 MG: 500 TABLET ORAL at 08:09

## 2022-06-12 RX ADMIN — TAMSULOSIN HYDROCHLORIDE 0.4 MG: 0.4 CAPSULE ORAL at 08:08

## 2022-06-12 RX ADMIN — SODIUM CHLORIDE, PRESERVATIVE FREE 10 ML: 5 INJECTION INTRAVENOUS at 08:07

## 2022-06-12 RX ADMIN — Medication 1 TABLET: at 08:08

## 2022-06-12 RX ADMIN — Medication 50 MG: at 08:08

## 2022-06-12 RX ADMIN — AMLODIPINE BESYLATE 2.5 MG: 2.5 TABLET ORAL at 08:08

## 2022-06-12 RX ADMIN — Medication 2000 UNITS: at 08:08

## 2022-06-12 ASSESSMENT — PAIN SCALES - GENERAL: PAINLEVEL_OUTOF10: 0

## 2022-06-12 NOTE — DISCHARGE SUMMARY
Hospital Medicine Discharge Summary      Patient Identification:   Dante Riggins   : 1950  MRN: [id number]   Account: [de-identified]      Patient's PCP: Julio Cesar Goldstein MD    Admit Date: 6/10/2022     Discharge Date:   2022    Admitting Physician: PRAMOD Lott CNP     Discharge Physician: PRAMOD Lott CNP     Discharge Diagnoses and Hospital Course:    Presented to  with CP, transfer to the main ED. 1. Chest pain, resolved. Possibly GI related. ACS ruled out. CTA of the chest non acute. Cardiology seen, ok for echo and stress OP. Orders given. 2. Mild bradycardia. Asymptomatic. HR high 50's. Not taking any AV blocking medications. Maintained tele. 3. Mixed hyperlipidemia. Reports allergy to Crestor, does not recall symptoms. Believes he tolerated Lipitor. Will restart at low dose with OP titration to max dose he can tolerate. RX sent. 4. Near syncope, likely due to anxiety / hyperventilation.     5. Accelerated hypertension, resolved. Monitor BP. S/P IV Hydralazine x 3. Started on Norvasc. Rx sent. 6. Elevated LFTs secondary to COVID 19. Resolved. 7. BPH. Flomax      No recurrent CP since admission. Evaluated by cardiology who agreed in OP ischemic work up. Echo and stress orders given. New medications sent his pharmacy of choice. The patient was seen and examined on day of discharge and this discharge summary is in conjunction with any daily progress note from day of discharge.         Exam:     Vitals:  Vitals:    22 2351 22 0313 22 0807 22 1118   BP: 125/72 132/77 (!) 142/83 127/83   Pulse: 87 74 86 78   Resp:  14 16 16   Temp:  98 °F (36.7 °C) 98.1 °F (36.7 °C) 97.5 °F (36.4 °C)   TempSrc:  Oral Oral Oral   SpO2:  97% 96% 100%   Weight:       Height:         Weight: Weight: 183 lb (83 kg)     24 hour intake/output:    Intake/Output Summary (Last 24 hours) at 2022 1316  Last data filed at 2022 0807  Gross per 24 hour   Intake 480 ml   Output    Net 480 ml         General appearance:  No apparent distress, appears stated age and cooperative. HEENT:  Normal cephalic, atraumatic without obvious deformity. Pupils equal, round, and reactive to light. Extra ocular muscles intact. Conjunctivae/corneas clear. Neck: Supple, with full range of motion. No jugular venous distention. Trachea midline. Respiratory:  Normal respiratory effort. Clear to auscultation, bilaterally without Rales/Wheezes/Rhonchi. Cardiovascular:  Regular rate and rhythm with normal S1/S2 without murmurs, rubs or gallops. Abdomen: Soft, non-tender, non-distended with normal bowel sounds. Musculoskeletal:  No clubbing, cyanosis or edema bilaterally. Full range of motion without deformity. Skin: Skin color, texture, turgor normal.  No rashes or lesions. Neurologic:  Neurovascularly intact without any focal sensory/motor deficits. Cranial nerves: II-XII intact, grossly non-focal.  Psychiatric:  Alert and oriented, thought content appropriate, normal insight  Capillary Refill: Brisk,< 3 seconds   Peripheral Pulses: +2 palpable, equal bilaterally       Labs: For convenience and continuity at follow-up the following most recent labs are provided:      CBC:    Lab Results   Component Value Date    WBC 6.5 06/11/2022    HGB 13.7 06/11/2022    HCT 41.4 06/11/2022     06/11/2022       Renal:    Lab Results   Component Value Date     06/11/2022    K 3.9 06/11/2022     06/11/2022    CO2 26 06/11/2022    BUN 26 06/11/2022    CREATININE 1.1 06/11/2022    CALCIUM 9.0 06/11/2022         Significant Diagnostic Studies    Radiology:   CTA CHEST W WO CONTRAST   Final Result   Impression:   1. Normal caliber thoracic aorta. Negative for thoracic aortic dissection. 2. Soft plaques in the abdominal aorta. Small penetrating atherosclerotic    ulcers in the abdominal aorta. 3. Hepatic lesions, with findings suggestive hemangiomata.       This document has been electronically signed by: Deonte Franklin MD on    06/12/2022 12:21 AM      All CTs at this facility use dose modulation techniques and iterative    reconstructions, and/or weight-based dosing   when appropriate to reduce radiation to a low as reasonably achievable. XR CHEST PORTABLE   Final Result   1. No acute cardiopulmonary finding. **This report has been created using voice recognition software. It may contain minor errors which are inherent in voice recognition technology. **      Final report electronically signed by Dr Johnnie Wolf on 6/10/2022 9:58 AM             Consults:     IP CONSULT TO CARDIOLOGY    Disposition:    [x] Home       [] TCU       [] Rehab       [] Psych       [] SNF       [] [facility]       [] Other-    Condition at Discharge: Stable    Code Status:  Full Code     Patient Instructions:    Echocardiogram and treadmill stress test.  Activity: activity as tolerated  Diet: ADULT DIET; Regular; Low Sodium (2 gm)      Follow-up visits:   No follow-up provider specified.        Discharge Medications:        Medication List      START taking these medications    amLODIPine 2.5 MG tablet  Commonly known as: NORVASC  Take 1 tablet by mouth daily  Start taking on: June 13, 2022     atorvastatin 20 MG tablet  Commonly known as: LIPITOR  Take 1 tablet by mouth nightly        CONTINUE taking these medications    ascorbic acid 1000 MG tablet  Commonly known as: VITAMIN C  Take 1 tablet by mouth daily     aspirin EC 81 MG EC tablet     hydrOXYzine HCl 10 MG tablet  Commonly known as: ATARAX     ketoconazole 2 % shampoo  Commonly known as: NIZORAL     Melatonin 10 MG Tabs     multivitamin-minerals Tabs tablet     tamsulosin 0.4 mg capsule  Commonly known as: FLOMAX  Take 1 capsule by mouth daily     triamcinolone 0.1 % cream  Commonly known as: KENALOG     vitamin D 50 MCG (2000 UT) Tabs tablet  Commonly known as: CHOLECALCIFEROL  Take 1 tablet by mouth daily     zinc sulfate 220 (50 Zn) MG capsule  Commonly known as: ZINCATE  Take 1 capsule by mouth daily           Where to Get Your Medications      These medications were sent to [address] - [place], [address] [facility] [address] [phone] [address] [facility] [address]    Phone: [phone]   · atorvastatin 20 MG tablet     These medications were sent to [address] [facility] [address] [phone]  [address]    Phone: [phone]   · amLODIPine 2.5 MG tablet         Time Spent on discharge is more than 35 minutes in the examination, evaluation, counseling and review of medications and discharge plan. Signed: Thank you Mary Reed MD for the opportunity to be involved in this patient's care.     Electronically signed by PRAMOD Willis CNP on 6/12/2022 at 1:16 PM

## 2022-06-12 NOTE — PROGRESS NOTES
[id number] This nurse called down to ECHO to see if patient would have this down today. No response and call back information given via voicemail. Awaiting response. Patient updated.

## 2022-06-12 NOTE — PLAN OF CARE
Problem: Discharge Planning  Goal: Discharge to home or other facility with appropriate resources  Outcome: Progressing     Problem: Chronic Conditions and Co-morbidities  Goal: Patient's chronic conditions and co-morbidity symptoms are monitored and maintained or improved  Outcome: Progressing     Problem: Pain  Goal: Verbalizes/displays adequate comfort level or baseline comfort level  Outcome: Progressing     Problem: Safety - Adult  Goal: Free from fall injury  Outcome: Progressing

## 2022-06-13 ENCOUNTER — TELEPHONE (OUTPATIENT)
Dept: FAMILY MEDICINE CLINIC | Age: 72
End: 2022-06-13

## 2022-06-13 NOTE — TELEPHONE ENCOUNTER
[facility] Initial Follow Up Call    Outreach made within 2 business days of discharge: Yes    Patient: Jordyn Hernadez Patient : 1950   MRN: [id number]  Reason for Admission: There are no discharge diagnoses documented for the most recent discharge. Discharge Date: 22       Spoke with: Aileen Smiley    Discharge department/facility: [facility]    TCM Interactive Patient Contact:  Was patient able to fill all prescriptions: Yes  Was patient instructed to bring all medications to the follow-up visit: Yes  Is patient taking all medications as directed in the discharge summary?  Yes  Does patient understand their discharge instructions: Yes  Does patient have questions or concerns that need addressed prior to 7-14 day follow up office visit: no    Scheduled appointment with PCP within 7-14 days    Follow Up  Future Appointments   Date Time Provider Adriel Poe   2022 11:00 AM STR ECHO RM2 STRZ ECHO [facility] HOD   2022 12:30 PM STR NUC MED HC  INJECT  [address] HOD   2022  1:00 PM STR [address]   2022  1:30 PM STR NM STRESS RM1 STRZ STRESS [place] HOD   2022  2:00 PM STR [facility] ROOM [facility] [address] [facility]   2022  8:00 AM Yariel Lancaster MD N [facility] Heart [facility]   2022 10:45 AM Jacquelin Arechiga MD SRPX [facility]   2022  8:45 AM Jacquelin Arechiga MD [facility]   10/7/2022  8:00 AM Agustina De La Vega, APRN - [address] [place], [place]

## 2022-06-15 NOTE — PROGRESS NOTES
Perfect served ordering provider to clarify echo order scheduled for 6/17. Ordered as ECHO 2D WO Color Doppler Complete [[id number]]. Advised the correct order for a complete echo is [ECH10] or a limited echo [ECH11].

## 2022-06-17 ENCOUNTER — HOSPITAL ENCOUNTER (OUTPATIENT)
Dept: NON INVASIVE DIAGNOSTICS | Age: 72
Discharge: HOME | End: 2022-06-17
Payer: MEDICARE

## 2022-06-17 DIAGNOSIS — R07.9 ACUTE CHEST PAIN: ICD-10-CM

## 2022-06-17 DIAGNOSIS — I20.8 OTHER FORMS OF ANGINA PECTORIS (HCC): ICD-10-CM

## 2022-06-17 LAB
LV EF: 60 %
LVEF MODALITY: NORMAL

## 2022-06-17 PROCEDURE — 3430000000 HC RX DIAGNOSTIC RADIOPHARMACEUTICAL: Performed by: INTERNAL MEDICINE

## 2022-06-17 PROCEDURE — A9500 TC99M SESTAMIBI: HCPCS | Performed by: INTERNAL MEDICINE

## 2022-06-17 PROCEDURE — 6360000002 HC RX W HCPCS

## 2022-06-17 PROCEDURE — 93017 CV STRESS TEST TRACING ONLY: CPT | Performed by: NUCLEAR MEDICINE

## 2022-06-17 PROCEDURE — 93306 TTE W/DOPPLER COMPLETE: CPT

## 2022-06-17 PROCEDURE — 78452 HT MUSCLE IMAGE SPECT MULT: CPT

## 2022-06-17 RX ADMIN — Medication 10.1 MILLICURIE: at 11:30

## 2022-06-17 RX ADMIN — Medication 34.6 MILLICURIE: at 12:15

## 2022-06-17 NOTE — PROGRESS NOTES
Spoke with [facility] Paphanchith APRN-CNP. Echo ordered as a full study.    Echo tech communicated with patient of possible change in estimated cost

## 2022-06-21 ENCOUNTER — TELEPHONE (OUTPATIENT)
Dept: CARDIOLOGY CLINIC | Age: 72
End: 2022-06-21

## 2022-06-21 NOTE — TELEPHONE ENCOUNTER
LM to patient to return call. Jazmin Francois MD  [facility] Clinical Support Pool  Please inform patient that stress test result was unremarkable with no evidence for ischemia.

## 2022-06-23 ENCOUNTER — OFFICE VISIT (OUTPATIENT)
Dept: CARDIOLOGY CLINIC | Age: 72
End: 2022-06-23
Payer: MEDICARE

## 2022-06-23 VITALS
WEIGHT: 182.4 LBS | SYSTOLIC BLOOD PRESSURE: 134 MMHG | DIASTOLIC BLOOD PRESSURE: 77 MMHG | HEART RATE: 78 BPM | HEIGHT: 69 IN | BODY MASS INDEX: 27.02 KG/M2

## 2022-06-23 DIAGNOSIS — I20.8 OTHER FORMS OF ANGINA PECTORIS (HCC): ICD-10-CM

## 2022-06-23 DIAGNOSIS — I10 PRIMARY HYPERTENSION: Primary | ICD-10-CM

## 2022-06-23 DIAGNOSIS — E78.01 FAMILIAL HYPERCHOLESTEROLEMIA: ICD-10-CM

## 2022-06-23 PROBLEM — I20.89 OTHER FORMS OF ANGINA PECTORIS: Status: ACTIVE | Noted: 2022-06-23

## 2022-06-23 PROCEDURE — 1111F DSCHRG MED/CURRENT MED MERGE: CPT | Performed by: NUCLEAR MEDICINE

## 2022-06-23 PROCEDURE — G8417 CALC BMI ABV UP PARAM F/U: HCPCS | Performed by: NUCLEAR MEDICINE

## 2022-06-23 PROCEDURE — 99214 OFFICE O/P EST MOD 30 MIN: CPT | Performed by: NUCLEAR MEDICINE

## 2022-06-23 PROCEDURE — 1036F TOBACCO NON-USER: CPT | Performed by: NUCLEAR MEDICINE

## 2022-06-23 PROCEDURE — G8427 DOCREV CUR MEDS BY ELIG CLIN: HCPCS | Performed by: NUCLEAR MEDICINE

## 2022-06-23 PROCEDURE — 1123F ACP DISCUSS/DSCN MKR DOCD: CPT | Performed by: NUCLEAR MEDICINE

## 2022-06-23 PROCEDURE — 3017F COLORECTAL CA SCREEN DOC REV: CPT | Performed by: NUCLEAR MEDICINE

## 2022-06-23 RX ORDER — ATORVASTATIN CALCIUM 20 MG/1
20 TABLET, FILM COATED ORAL NIGHTLY
Qty: 90 TABLET | Refills: 2 | Status: SHIPPED | OUTPATIENT
Start: 2022-06-23 | End: 2022-07-05 | Stop reason: SDUPTHER

## 2022-06-23 RX ORDER — AMLODIPINE BESYLATE 2.5 MG/1
2.5 TABLET ORAL DAILY
Qty: 90 TABLET | Refills: 2 | Status: SHIPPED | OUTPATIENT
Start: 2022-06-23

## 2022-06-23 RX ORDER — TERBINAFINE HYDROCHLORIDE 250 MG/1
250 TABLET ORAL DAILY
COMMUNITY

## 2022-06-23 NOTE — PROGRESS NOTES
[id number] \[facility] [place] [facility]\"" [place] .  [address] [place] [address]  Dept: [phone]  Dept Fax: [phone]  Loc: [phone]    Visit Date: 6/23/2022    Demetris Sales is a 66 y.o. male who presents todayfor:  Chief Complaint   Patient presents with    New Patient    Hypertension    Hyperlipidemia   admitted lately for HTN and some angina   Some dizziness   Work up was negative  Had a stress test and echo   Both looked okay   BP was managed  Here for follow up   Does have family history of CAD  himself he didn't have known CAD  Does have intermittent symptoms  No more chest pain since admission   Know HTN and hyperlipidemia  On statins for hyperlipidemia        HPI:  HPI  Past Medical History:   Diagnosis Date    BPH (benign prostatic hyperplasia)     COVID-19 11/12/2020    Hepatitis B     Hyperlipidemia     Psoriasis       Past Surgical History:   Procedure Laterality Date    BACK SURGERY  2015    Herniated disk     MOUTH SURGERY  1968     Family History   Problem Relation Age of Onset    High Cholesterol Mother     Emphysema Father     Asthma Father     Cancer Sister     Cancer Brother      Social History     Tobacco Use    Smoking status: Never Smoker    Smokeless tobacco: Never Used    Tobacco comment: As a child my parents were heavy smokers   Substance Use Topics    Alcohol use: Never      Current Outpatient Medications   Medication Sig Dispense Refill    Coenzyme Q10 (CO Q 10 PO) Take by mouth      terbinafine (LAMISIL) 250 MG tablet Take 250 mg by mouth daily      amLODIPine (NORVASC) 2.5 MG tablet Take 1 tablet by mouth daily 90 tablet 2    atorvastatin (LIPITOR) 20 MG tablet Take 1 tablet by mouth nightly 90 tablet 2    triamcinolone (KENALOG) 0.1 % cream Apply topically Daily      hydrOXYzine HCl (ATARAX) 10 MG tablet Take by mouth daily as needed      Melatonin 10 MG TABS Take by mouth daily as needed      tamsulosin (FLOMAX) 0.4 MG capsule Take 1 capsule by mouth daily 90 capsule 3    zinc sulfate (ZINCATE) 220 (50 Zn) MG capsule Take 1 capsule by mouth daily 30 capsule 3    vitamin C (VITAMIN C) 1000 MG tablet Take 1 tablet by mouth daily 30 tablet 3    Vitamin D (CHOLECALCIFEROL) 50 MCG (2000 UT) TABS tablet Take 1 tablet by mouth daily 60 tablet     ketoconazole (NIZORAL) 2 % shampoo Apply topically daily as needed for Itching Apply topically daily as needed.  Multiple Vitamins-Minerals (MULTIVITAMIN-MINERALS) TABS tablet Take 1 tablet by mouth daily      aspirin EC 81 MG EC tablet Take 81 mg by mouth daily       No current facility-administered medications for this visit.      Allergies   Allergen Reactions    Crestor [Rosuvastatin Calcium]     Benadryl [Diphenhydramine] Other (See Comments)     Jitter, hyperactivity if takes more than one day      Health Maintenance   Topic Date Due    COVID-19 Vaccine (1) Never done    Shingles vaccine (1 of 2) Never done    Depression Screen  08/24/2022    A1C test (Diabetic or Prediabetic)  09/08/2022    Annual Wellness Visit (AWV)  09/08/2022    Lipids  06/11/2023    Colorectal Cancer Screen  04/09/2026    DTaP/Tdap/Td vaccine (2 - Td or Tdap) 09/03/2030    Flu vaccine  Completed    Pneumococcal 65+ years Vaccine  Completed    Hepatitis C screen  Completed    Hepatitis A vaccine  Aged Out    Hepatitis B vaccine  Aged Out    Hib vaccine  Aged Out    Meningococcal (ACWY) vaccine  Aged Out       Subjective:  Review of Systems  General:   No fever, no chills, some fatigue or weight loss  Pulmonary:    some dyspnea, no wheezing  Cardiac:    Denies recent chest pain,   GI:     No nausea or vomiting, no abdominal pain  Neuro:    No dizziness or light headedness,   Musculoskeletal:  No recent active issues  Extremities:   No edema, no obvious claudication       Objective:  Physical Exam  /77   Pulse 78   Ht 5' 9\" (1.753 m)   Wt 182 lb 6.4 oz (82.7 kg)   BMI 26.94 kg/m²   General:   Well developed, well nourished  Lungs:   Clear to auscultation  Heart:    Normal S1 S2, Slight murmur. no rubs, no gallops  Abdomen:   Soft, non tender, no organomegalies, positive bowel sounds  Extremities:   No edema, no cyanosis, good peripheral pulses  Neurological:   Awake, alert, oriented. No obvious focal deficits  Musculoskelatal:  No obvious deformities    Assessment:      Diagnosis Orders   1. Primary hypertension     2. Other forms of angina pectoris ([address]     3. Familial hypercholesterolemia     as above  Multiple risk factors for CAD  Symptoms as above  Admission for uncontrolled HTN   Here for follow up     Plan:  No follow-ups on file. Discussed  Currently no clear indication for cath   Yet we will consider a cath based on his family history of CAD and risk factors if symptoms change   Patient was advised to report to the ER if he has recurrent symptoms with specific instructions given about severity and duration of symptoms    Continue risk factor modification and medical management  Thank you for allowing me to participate in the care of your patient. Please don't hesitate to contact me regarding any further issues related to the patient care    Orders Placed:  No orders of the defined types were placed in this encounter. Medications Prescribed:  Orders Placed This Encounter   Medications    amLODIPine (NORVASC) 2.5 MG tablet     Sig: Take 1 tablet by mouth daily     Dispense:  90 tablet     Refill:  2    atorvastatin (LIPITOR) 20 MG tablet     Sig: Take 1 tablet by mouth nightly     Dispense:  90 tablet     Refill:  2          Discussed use, benefit, and side effects of prescribed medications. All patient questions answered. Pt voicedunderstanding. Instructed to continue current medications, diet and exercise. Continue risk factor modification and medical management. Patient agreed with treatment plan. Follow up as directed.     Electronically signedby Alan Holliday Sally Hickey MD on 6/23/2022 at 7:47 AM

## 2022-06-24 ENCOUNTER — OFFICE VISIT (OUTPATIENT)
Dept: FAMILY MEDICINE CLINIC | Age: 72
End: 2022-06-24
Payer: MEDICARE

## 2022-06-24 VITALS
HEIGHT: 69 IN | BODY MASS INDEX: 26.66 KG/M2 | DIASTOLIC BLOOD PRESSURE: 80 MMHG | SYSTOLIC BLOOD PRESSURE: 142 MMHG | OXYGEN SATURATION: 98 % | TEMPERATURE: 97.3 F | WEIGHT: 180 LBS | HEART RATE: 64 BPM | RESPIRATION RATE: 14 BRPM

## 2022-06-24 DIAGNOSIS — I20.8 OTHER FORMS OF ANGINA PECTORIS (HCC): Primary | ICD-10-CM

## 2022-06-24 DIAGNOSIS — E78.2 MIXED HYPERLIPIDEMIA: ICD-10-CM

## 2022-06-24 DIAGNOSIS — R73.09 ELEVATED GLUCOSE: ICD-10-CM

## 2022-06-24 DIAGNOSIS — Z09 HOSPITAL DISCHARGE FOLLOW-UP: ICD-10-CM

## 2022-06-24 DIAGNOSIS — I10 PRIMARY HYPERTENSION: ICD-10-CM

## 2022-06-24 PROBLEM — R07.9 ACUTE CHEST PAIN: Status: RESOLVED | Noted: 2022-06-10 | Resolved: 2022-06-24

## 2022-06-24 PROBLEM — R74.9 ABNORMAL LEVEL OF SERUM ENZYME: Status: RESOLVED | Noted: 2020-11-23 | Resolved: 2022-06-24

## 2022-06-24 PROCEDURE — 99495 TRANSJ CARE MGMT MOD F2F 14D: CPT | Performed by: FAMILY MEDICINE

## 2022-06-24 PROCEDURE — 1111F DSCHRG MED/CURRENT MED MERGE: CPT | Performed by: FAMILY MEDICINE

## 2022-06-24 ASSESSMENT — PATIENT HEALTH QUESTIONNAIRE - PHQ9
2. FEELING DOWN, DEPRESSED OR HOPELESS: 0
SUM OF ALL RESPONSES TO PHQ QUESTIONS 1-9: 0
SUM OF ALL RESPONSES TO PHQ9 QUESTIONS 1 & 2: 0
SUM OF ALL RESPONSES TO PHQ QUESTIONS 1-9: 0
1. LITTLE INTEREST OR PLEASURE IN DOING THINGS: 0

## 2022-06-24 ASSESSMENT — ENCOUNTER SYMPTOMS
SHORTNESS OF BREATH: 0
WHEEZING: 0

## 2022-06-24 NOTE — PROGRESS NOTES
Post-Discharge Transitional Care Follow Up      Keyon Richards   YOB: 1950    Date of Office Visit:  6/24/2022  Date of Hospital Admission: 6/10/22  Date of Hospital Discharge: 6/12/22  Readmission Risk Score (high >=14%. Medium >=10%):Readmission Risk Score: 7 ( )      Care management risk score Rising risk (score 2-5) and Complex Care (Scores >=6): 0     Non face to face  following discharge, date last encounter closed (first attempt may have been earlier): 6/13/2022 10:01 AM     Call initiated 2 business days of discharge: Yes     Other forms of angina pectoris ([address]  Mixed hyperlipidemia  -     Lipid Panel; Future  -     Hepatic Function Panel; Future  Primary hypertension  Hospital discharge follow-up  -     NH DISCHARGE MEDS RECONCILED W/ CURRENT OUTPATIENT MED LIST  Elevated glucose  -     Hemoglobin A1C; Future      Medical Decision Making: moderate complexity  Return if symptoms worsen or fail to improve. Chest pain: Stress test negative. Follows with cardiology    Hyperlipidemia: Now on Lipitor. Check labs in 4 to 6 weeks. Consider increasing Lipitor dose    Hypertension: Mildly elevated today. Continue Norvasc. He is monitoring blood pressure and will send a list of blood pressures to cardiology    Elevated glucose/history of pre-diabetes: Check A1c    Subjective:   HPI    Inpatient course: Discharge summary reviewed- see chart. Interval history/Current status: hospital admission for chest pain and elevated BP. Seen by cardiology yesterday. Had negative stress test.   No further chest pain. HTN:  -150. Now on norvasc. Hyperlipidemia:  Now on lipitor. Triglycerides 600 on labs on 6/11.     Patient Active Problem List   Diagnosis    Benign prostatic hyperplasia with nocturia    Mixed hyperlipidemia    Psoriasis    Pre-diabetes    COVID-19    Abnormal level of serum enzyme    Acute chest pain    Other forms of angina pectoris (HCC)       Medications listed as ordered at the time of discharge from hospital     Medication List          Accurate as of June 24, 2022 10:43 AM. If you have any questions, ask your nurse or doctor.             CONTINUE taking these medications    amLODIPine 2.5 MG tablet  Commonly known as: NORVASC  Take 1 tablet by mouth daily     ascorbic acid 1000 MG tablet  Commonly known as: VITAMIN C  Take 1 tablet by mouth daily     aspirin EC 81 MG EC tablet     atorvastatin 20 MG tablet  Commonly known as: LIPITOR  Take 1 tablet by mouth nightly     CO Q 10 PO     hydrOXYzine HCl 10 MG tablet  Commonly known as: ATARAX     ketoconazole 2 % shampoo  Commonly known as: NIZORAL     Melatonin 10 MG Tabs     multivitamin-minerals Tabs tablet     tamsulosin 0.4 MG capsule  Commonly known as: FLOMAX  Take 1 capsule by mouth daily     terbinafine 250 MG tablet  Commonly known as: LAMISIL     triamcinolone 0.1 % cream  Commonly known as: KENALOG     vitamin D 50 MCG (2000 UT) Tabs tablet  Commonly known as: CHOLECALCIFEROL  Take 1 tablet by mouth daily     zinc sulfate 220 (50 Zn) MG capsule  Commonly known as: ZINCATE  Take 1 capsule by mouth daily             Medications marked \"taking\" at this time  Outpatient Medications Marked as Taking for the 6/24/22 encounter (Office Visit) with Ayde Georges MD   Medication Sig Dispense Refill    Coenzyme Q10 (CO Q 10 PO) Take by mouth      terbinafine (LAMISIL) 250 MG tablet Take 250 mg by mouth daily      amLODIPine (NORVASC) 2.5 MG tablet Take 1 tablet by mouth daily 90 tablet 2    atorvastatin (LIPITOR) 20 MG tablet Take 1 tablet by mouth nightly 90 tablet 2    triamcinolone (KENALOG) 0.1 % cream Apply topically Daily      hydrOXYzine HCl (ATARAX) 10 MG tablet Take by mouth daily as needed      Melatonin 10 MG TABS Take by mouth daily as needed      tamsulosin (FLOMAX) 0.4 MG capsule Take 1 capsule by mouth daily 90 capsule 3    zinc sulfate (ZINCATE) 220 (50 Zn) MG capsule Take 1 capsule by mouth daily 30 capsule 3    vitamin C (VITAMIN C) 1000 MG tablet Take 1 tablet by mouth daily 30 tablet 3    Vitamin D (CHOLECALCIFEROL) 50 MCG (2000 UT) TABS tablet Take 1 tablet by mouth daily 60 tablet     ketoconazole (NIZORAL) 2 % shampoo Apply topically daily as needed for Itching Apply topically daily as needed.  Multiple Vitamins-Minerals (MULTIVITAMIN-MINERALS) TABS tablet Take 1 tablet by mouth daily      aspirin EC 81 MG EC tablet Take 81 mg by mouth daily          Medications patient taking as of now reconciled against medications ordered at time of hospital discharge: Yes    Review of Systems   Constitutional: Negative for chills and fever. Respiratory: Negative for shortness of breath and wheezing. Cardiovascular: Negative for chest pain and leg swelling. Neurological: Negative for dizziness and syncope. Objective:    BP (!) 142/80 (Site: Left Upper Arm)   Pulse 64   Temp 97.3 °F (36.3 °C)   Resp 14   Ht 5' 9\" (1.753 m)   Wt 180 lb (81.6 kg)   SpO2 98%   BMI 26.58 kg/m²   Physical Exam  Vitals reviewed. Constitutional:       General: He is not in acute distress. Appearance: He is well-developed. He is not ill-appearing. Cardiovascular:      Rate and Rhythm: Normal rate and regular rhythm. Heart sounds: No murmur heard. Pulmonary:      Effort: Pulmonary effort is normal. No respiratory distress. Breath sounds: Normal breath sounds. No wheezing or rhonchi. Musculoskeletal:      Right lower leg: No edema. Left lower leg: No edema. Neurological:      Mental Status: He is alert. Mental status is at baseline. Psychiatric:         Mood and Affect: Mood normal.         Behavior: Behavior normal.         An electronic signature was used to authenticate this note.   --Corine Khan MD

## 2022-07-03 ENCOUNTER — PATIENT MESSAGE (OUTPATIENT)
Dept: FAMILY MEDICINE CLINIC | Age: 72
End: 2022-07-03

## 2022-07-05 ENCOUNTER — TELEPHONE (OUTPATIENT)
Dept: CARDIOLOGY CLINIC | Age: 72
End: 2022-07-05

## 2022-07-05 RX ORDER — ATORVASTATIN CALCIUM 20 MG/1
20 TABLET, FILM COATED ORAL NIGHTLY
Qty: 90 TABLET | Refills: 2 | Status: CANCELLED | OUTPATIENT
Start: 2022-07-05

## 2022-07-05 RX ORDER — ATORVASTATIN CALCIUM 20 MG/1
20 TABLET, FILM COATED ORAL NIGHTLY
Qty: 90 TABLET | Refills: 2 | Status: SHIPPED | OUTPATIENT
Start: 2022-07-05

## 2022-07-05 NOTE — TELEPHONE ENCOUNTER
From: Joseph Hilton  To: Dr. Brynn Barroso: 7/3/2022 8:56 PM EDT  Subject: Refill on generic Lipitor     I will need a refill sometime this week. I prefer to use [facility] on [employer] in [facility] [place] [address] [place].  Thanks

## 2022-07-05 NOTE — TELEPHONE ENCOUNTER
Patient is calling regarding his medication for lipitor that was started when he was in the hospital. He said that he cannot find the medication and keeps meds in a lock box. Please f/u with pt to discuss.     [address] [place] [address] [id number]  [place], [address] [facility] [phone]

## 2022-07-06 NOTE — PLAN OF CARE
Problem: Discharge Planning  Goal: Discharge to home or other facility with appropriate resources  Outcome: Progressing     Problem: Chronic Conditions and Co-morbidities  Goal: Patient's chronic conditions and co-morbidity symptoms are monitored and maintained or improved  Outcome: Progressing     Problem: Pain  Goal: Verbalizes/displays adequate comfort level or baseline comfort level  Outcome: Progressing  Note: No chest pain reported this shift. Patient c/o headache, prn tylenol given.      Problem: Safety - Adult  Goal: Free from fall injury  Outcome: Progressing Rifampin Pregnancy And Lactation Text: This medication is Pregnancy Category C and it isn't know if it is safe during pregnancy. It is also excreted in breast milk and should not be used if you are breast feeding.

## 2022-07-07 ENCOUNTER — TELEPHONE (OUTPATIENT)
Dept: CARDIOLOGY CLINIC | Age: 72
End: 2022-07-07

## 2022-07-07 ENCOUNTER — HOSPITAL ENCOUNTER (OUTPATIENT)
Age: 72
Discharge: HOME | End: 2022-07-07
Payer: MEDICARE

## 2022-07-07 DIAGNOSIS — E78.2 MIXED HYPERLIPIDEMIA: ICD-10-CM

## 2022-07-07 DIAGNOSIS — R73.09 ELEVATED GLUCOSE: ICD-10-CM

## 2022-07-07 LAB
ALBUMIN SERPL-MCNC: 4.7 G/DL (ref 3.5–5.1)
ALP BLD-CCNC: 105 U/L (ref 38–126)
ALT SERPL-CCNC: 27 U/L (ref 11–66)
AST SERPL-CCNC: 20 U/L (ref 5–40)
AVERAGE GLUCOSE: 123 MG/DL (ref 70–126)
BILIRUB SERPL-MCNC: 0.3 MG/DL (ref 0.3–1.2)
BILIRUBIN DIRECT: < 0.2 MG/DL (ref 0–0.3)
CHOLESTEROL, TOTAL: 179 MG/DL (ref 100–199)
HBA1C MFR BLD: 6.1 % (ref 4.4–6.4)
HDLC SERPL-MCNC: 33 MG/DL
LDL CHOLESTEROL CALCULATED: 105 MG/DL
TOTAL PROTEIN: 6.5 G/DL (ref 6.1–8)
TRIGL SERPL-MCNC: 204 MG/DL (ref 0–199)

## 2022-07-07 PROCEDURE — 80076 HEPATIC FUNCTION PANEL: CPT

## 2022-07-07 PROCEDURE — 83036 HEMOGLOBIN GLYCOSYLATED A1C: CPT

## 2022-07-07 PROCEDURE — 36415 COLL VENOUS BLD VENIPUNCTURE: CPT

## 2022-07-07 PROCEDURE — 80061 LIPID PANEL: CPT

## 2022-08-05 ENCOUNTER — TELEPHONE (OUTPATIENT)
Dept: CARDIOLOGY CLINIC | Age: 72
End: 2022-08-05

## 2022-08-05 NOTE — TELEPHONE ENCOUNTER
Most readings seem to be below 150/90. Would not change anything for now. If noticing frequently above 150/90 often, let us know.

## 2022-09-01 SDOH — HEALTH STABILITY: PHYSICAL HEALTH: ON AVERAGE, HOW MANY DAYS PER WEEK DO YOU ENGAGE IN MODERATE TO STRENUOUS EXERCISE (LIKE A BRISK WALK)?: 0 DAYS

## 2022-09-01 ASSESSMENT — LIFESTYLE VARIABLES
HOW MANY STANDARD DRINKS CONTAINING ALCOHOL DO YOU HAVE ON A TYPICAL DAY: 0
HOW OFTEN DO YOU HAVE SIX OR MORE DRINKS ON ONE OCCASION: 1
HOW OFTEN DO YOU HAVE A DRINK CONTAINING ALCOHOL: 1
HOW OFTEN DO YOU HAVE A DRINK CONTAINING ALCOHOL: NEVER
HOW MANY STANDARD DRINKS CONTAINING ALCOHOL DO YOU HAVE ON A TYPICAL DAY: PATIENT DOES NOT DRINK

## 2022-09-08 ENCOUNTER — OFFICE VISIT (OUTPATIENT)
Dept: FAMILY MEDICINE CLINIC | Age: 72
End: 2022-09-08
Payer: MEDICARE

## 2022-09-08 ENCOUNTER — HOSPITAL ENCOUNTER (OUTPATIENT)
Age: 72
Discharge: HOME | End: 2022-09-08
Payer: MEDICARE

## 2022-09-08 VITALS
OXYGEN SATURATION: 98 % | TEMPERATURE: 97 F | BODY MASS INDEX: 27.05 KG/M2 | HEART RATE: 64 BPM | DIASTOLIC BLOOD PRESSURE: 80 MMHG | WEIGHT: 182.6 LBS | RESPIRATION RATE: 16 BRPM | SYSTOLIC BLOOD PRESSURE: 134 MMHG | HEIGHT: 69 IN

## 2022-09-08 DIAGNOSIS — Z23 FLU VACCINE NEED: ICD-10-CM

## 2022-09-08 DIAGNOSIS — R97.20 ELEVATED PSA: ICD-10-CM

## 2022-09-08 DIAGNOSIS — Z00.00 MEDICARE ANNUAL WELLNESS VISIT, SUBSEQUENT: Primary | ICD-10-CM

## 2022-09-08 LAB — PROSTATE SPECIFIC ANTIGEN: 3.35 NG/ML (ref 0–1)

## 2022-09-08 PROCEDURE — 90694 VACC AIIV4 NO PRSRV 0.5ML IM: CPT | Performed by: FAMILY MEDICINE

## 2022-09-08 PROCEDURE — 84153 ASSAY OF PSA TOTAL: CPT

## 2022-09-08 PROCEDURE — 36415 COLL VENOUS BLD VENIPUNCTURE: CPT

## 2022-09-08 PROCEDURE — G0439 PPPS, SUBSEQ VISIT: HCPCS | Performed by: FAMILY MEDICINE

## 2022-09-08 PROCEDURE — G0008 ADMIN INFLUENZA VIRUS VAC: HCPCS | Performed by: FAMILY MEDICINE

## 2022-09-08 PROCEDURE — 1123F ACP DISCUSS/DSCN MKR DOCD: CPT | Performed by: FAMILY MEDICINE

## 2022-09-08 PROCEDURE — 3017F COLORECTAL CA SCREEN DOC REV: CPT | Performed by: FAMILY MEDICINE

## 2022-09-08 SDOH — ECONOMIC STABILITY: FOOD INSECURITY: WITHIN THE PAST 12 MONTHS, YOU WORRIED THAT YOUR FOOD WOULD RUN OUT BEFORE YOU GOT MONEY TO BUY MORE.: NEVER TRUE

## 2022-09-08 SDOH — ECONOMIC STABILITY: FOOD INSECURITY: WITHIN THE PAST 12 MONTHS, THE FOOD YOU BOUGHT JUST DIDN'T LAST AND YOU DIDN'T HAVE MONEY TO GET MORE.: NEVER TRUE

## 2022-09-08 ASSESSMENT — SOCIAL DETERMINANTS OF HEALTH (SDOH): HOW HARD IS IT FOR YOU TO PAY FOR THE VERY BASICS LIKE FOOD, HOUSING, MEDICAL CARE, AND HEATING?: NOT HARD AT ALL

## 2022-09-08 NOTE — PROGRESS NOTES
1.   Are you sick today? No  2. Do you have allergies to medication,food, or any vaccine? No          Allergies:  Crestor [rosuvastatin calcium] and Benadryl [diphenhydramine]    3. Have you ever had a serious reaction after receiving a vaccination? No  4. Do you have a long-term health problem with heart disease, lung disease, asthma, kidney disease,        metabolic disease (e.g., diabetes, anemia, or other blood disorder? Yes  5. Do you have cancer, leukemia, AIDS, or any other immune system problem? No  6. Have you had a seizure, brain, or other nervous system problem? No          Medical History:    Past Medical History:   Diagnosis Date    BPH (benign prostatic hyperplasia)     COVID-19 11/12/2020    Hepatitis B     Hyperlipidemia     Psoriasis        7. Do you take cortisone, prednisone, other steroids, or anticancer drugs, or have you had radiation        Treatments? No          Current Medications:    Current Outpatient Medications   Medication Sig Dispense Refill    atorvastatin (LIPITOR) 20 MG tablet Take 1 tablet by mouth nightly 90 tablet 2    Coenzyme Q10 (CO Q 10 PO) Take by mouth      terbinafine (LAMISIL) 250 MG tablet Take 250 mg by mouth daily      amLODIPine (NORVASC) 2.5 MG tablet Take 1 tablet by mouth daily 90 tablet 2    triamcinolone (KENALOG) 0.1 % cream Apply topically Daily      hydrOXYzine HCl (ATARAX) 10 MG tablet Take by mouth daily as needed      Melatonin 10 MG TABS Take by mouth daily as needed      tamsulosin (FLOMAX) 0.4 MG capsule Take 1 capsule by mouth daily 90 capsule 3    zinc sulfate (ZINCATE) 220 (50 Zn) MG capsule Take 1 capsule by mouth daily 30 capsule 3    vitamin C (VITAMIN C) 1000 MG tablet Take 1 tablet by mouth daily 30 tablet 3    Vitamin D (CHOLECALCIFEROL) 50 MCG (2000 UT) TABS tablet Take 1 tablet by mouth daily 60 tablet     ketoconazole (NIZORAL) 2 % shampoo Apply topically daily as needed for Itching Apply topically daily as needed. Multiple Vitamins-Minerals (MULTIVITAMIN-MINERALS) TABS tablet Take 1 tablet by mouth daily      aspirin EC 81 MG EC tablet Take 81 mg by mouth daily       No current facility-administered medications for this visit. 8.   During the past year, have you received a transfusion of blood or blood products, or been given        immune (gamma) globulin or an antiviral drug? No  9. For women:  Are you pregnant or is there a chance you could become pregnant during the next        Month? No  10. Have you received any vaccinations in the past 4 weeks? No    Immunization Summary:    Immunization History   Administered Date(s) Administered    Influenza Virus Vaccine 10/25/2019    Influenza, FLUAD, (age 72 y+), Adjuvanted, 0.5mL 09/03/2020    Influenza, FLUARIX, FLULAVAL, FLUZONE (age 10 mo+) AND AFLURIA, (age 1 y+), PF, 0.5mL 09/03/2020    Influenza, High Dose (Fluzone 65 yrs and older) 09/20/2021    Influenza, Triv, inactivated, subunit, adjuvanted, IM (Fluad 65 yrs and older) 09/07/2018    Pneumococcal Conjugate 13-valent ([id number]) 04/30/2019    Pneumococcal Polysaccharide ([id number]) 09/02/2020    Tdap (Boostrix, Adacel) 09/03/2020       11. Did you bring your immunization record card with you?   No

## 2022-09-08 NOTE — PROGRESS NOTES
Medicare Annual Wellness Visit    Beth Dai is here for Medicare AWV    Assessment & Plan   Medicare annual wellness visit, subsequent  Flu vaccine need  -     Influenza, FLUAD, (age 72 y+), IM, Preservative Free, 0.5 mL         monitor BP at home    Recommendations for Preventive Services Due: see orders and patient instructions/AVS.  Recommended screening schedule for the next 5-10 years is provided to the patient in written form: see Patient Instructions/AVS.     Return in about 6 months (around 3/8/2023) for HTN. Subjective       Erratic BPs. -165    Patient's complete Health Risk Assessment and screening values have been reviewed and are found in Flowsheets. The following problems were reviewed today and where indicated follow up appointments were made and/or referrals ordered.     Positive Risk Factor Screenings with Interventions:    Fall Risk:  Do you feel unsteady or are you worried about falling? : (!) yes  2 or more falls in past year?: (!) yes  Fall with injury in past year?: no   Fall Risk Interventions:    Home exercises provided to promote strength and balance            General Health and ACP:  General  In general, how would you say your health is?: Good  In the past 7 days, have you experienced any of the following: New or Increased Pain, New or Increased Fatigue, Loneliness, Social Isolation, Stress or Anger?: (!) Yes  Select all that apply: (!) New or Increased Fatigue  Do you get the social and emotional support that you need?: Yes  Do you have a Living Will?: Yes    Advance Directives       Power of  Living Will ACP-Advance Directive ACP-Power of     Not on File [place] on 11/18/20 Filed [address] Risk Interventions:  Poor self-assessment of health status: patient declines any further evaluation/treatment for this issue  Fatigue: patient declines any further evaluation/treatment for this issue    Health Habits/Nutrition:  Physical Activity: Unknown Days of Exercise per Week: 0 days    Minutes of Exercise per Session: Not on file     Have you lost any weight without trying in the past 3 months?: No  Body mass index: (!) 26.96  Have you seen the dentist within the past year?: (!) No  Health Habits/Nutrition Interventions:  Inadequate physical activity:  difficulty exercising as he cares for his wife  Dental exam overdue:  patient encouraged to make appointment with his/her dentist             Objective   Vitals:    09/08/22 0836   BP: 134/80   Site: Left Upper Arm   Position: Sitting   Pulse: 64   Resp: 16   Temp: 97 °F (36.1 °C)   SpO2: 98%   Weight: 182 lb 9.6 oz (82.8 kg)   Height: 5' 9\" (1.753 m)      Body mass index is 26.97 kg/m². Physical Exam  Vitals reviewed. Constitutional:       Appearance: He is well-developed. He is not ill-appearing. Cardiovascular:      Rate and Rhythm: Normal rate and regular rhythm. Heart sounds: No murmur heard. Pulmonary:      Effort: Pulmonary effort is normal. No respiratory distress. Breath sounds: Normal breath sounds. No wheezing or rhonchi. Musculoskeletal:      Right lower leg: No edema. Left lower leg: No edema. Neurological:      Mental Status: He is alert. Mental status is at baseline. Psychiatric:         Mood and Affect: Mood normal.         Behavior: Behavior normal.     Decreased neck lateral flexion bilaterally  5-/5 bilateral hip flexion        Allergies   Allergen Reactions    Crestor [Rosuvastatin Calcium]     Benadryl [Diphenhydramine] Other (See Comments)     Jitter, hyperactivity if takes more than one day      Prior to Visit Medications    Medication Sig Taking?  Authorizing Provider   atorvastatin (LIPITOR) 20 MG tablet Take 1 tablet by mouth nightly Yes Gia Sharpe MD   Coenzyme Q10 (CO Q 10 PO) Take by mouth Yes Historical Provider, MD   terbinafine (LAMISIL) 250 MG tablet Take 250 mg by mouth daily Yes Historical Provider, MD   amLODIPine (NORVASC) 2.5 MG tablet Take 1 tablet by mouth daily Yes Evgeny Osborne MD   triamcinolone (KENALOG) 0.1 % cream Apply topically Daily Yes Historical Provider, MD   hydrOXYzine HCl (ATARAX) 10 MG tablet Take by mouth daily as needed Yes Historical Provider, MD   Melatonin 10 MG TABS Take by mouth daily as needed Yes Historical Provider, MD   tamsulosin (FLOMAX) 0.4 MG capsule Take 1 capsule by mouth daily Yes Kylah Nichols APRN - CNP   zinc sulfate (ZINCATE) 220 (50 Zn) MG capsule Take 1 capsule by mouth daily Yes Osei Moreau MD   vitamin C (VITAMIN C) 1000 MG tablet Take 1 tablet by mouth daily Yes Osei Moreau MD   Vitamin D (CHOLECALCIFEROL) 50 MCG (2000 UT) TABS tablet Take 1 tablet by mouth daily Yes Osei Moreau MD   ketoconazole (NIZORAL) 2 % shampoo Apply topically daily as needed for Itching Apply topically daily as needed.  Yes Historical Provider, MD   Multiple Vitamins-Minerals (MULTIVITAMIN-MINERALS) TABS tablet Take 1 tablet by mouth daily Yes Historical Provider, MD   aspirin EC 81 MG EC tablet Take 81 mg by mouth daily Yes Historical Provider, MD [place] (Including outside providers/suppliers regularly involved in providing care):   Patient Care Team:  Jean Paul Zepeda MD as PCP - General (Family Medicine)  Jean Paul Zepeda MD as PCP - [facility]  Evgeny Osborne MD as Cardiologist (Cardiology)     Reviewed and updated this visit:  Tobacco  Allergies  Meds  Med Hx  Surg Hx  Soc Hx  Fam Hx

## 2022-09-08 NOTE — PATIENT INSTRUCTIONS
Personalized Preventive Plan for Sami Sharif August - 9/8/2022  Medicare offers a range of preventive health benefits. Some of the tests and screenings are paid in full while other may be subject to a deductible, co-insurance, and/or copay. Some of these benefits include a comprehensive review of your medical history including lifestyle, illnesses that may run in your family, and various assessments and screenings as appropriate. After reviewing your medical record and screening and assessments performed today your provider may have ordered immunizations, labs, imaging, and/or referrals for you. A list of these orders (if applicable) as well as your Preventive Care list are included within your After Visit Summary for your review. Other Preventive Recommendations:    A preventive eye exam performed by an eye specialist is recommended every 1-2 years to screen for glaucoma; cataracts, macular degeneration, and other eye disorders. A preventive dental visit is recommended every 6 months. Try to get at least 150 minutes of exercise per week or 10,000 steps per day on a pedometer . Order or download the FREE \"Exercise & Physical Activity: Your Everyday Guide\" from [employer]. Call 4-333.297.1554 or search [employer] online. You need 4044-6424 mg of calcium and 8777-3913 IU of vitamin D per day. It is possible to meet your calcium requirement with diet alone, but a vitamin D supplement is usually necessary to meet this goal.  When exposed to the sun, use a sunscreen that protects against both UVA and UVB radiation with an SPF of 30 or greater. Reapply every 2 to 3 hours or after sweating, drying off with a towel, or swimming. Always wear a seat belt when traveling in a car. Always wear a helmet when riding a bicycle or motorcycle.

## 2022-09-23 ENCOUNTER — TELEPHONE (OUTPATIENT)
Dept: CARDIOLOGY CLINIC | Age: 72
End: 2022-09-23

## 2022-10-07 ENCOUNTER — OFFICE VISIT (OUTPATIENT)
Dept: UROLOGY | Age: 72
End: 2022-10-07
Payer: MEDICARE

## 2022-10-07 VITALS
HEIGHT: 69 IN | WEIGHT: 183 LBS | BODY MASS INDEX: 27.11 KG/M2 | SYSTOLIC BLOOD PRESSURE: 148 MMHG | DIASTOLIC BLOOD PRESSURE: 84 MMHG

## 2022-10-07 DIAGNOSIS — R97.20 ELEVATED PSA: ICD-10-CM

## 2022-10-07 DIAGNOSIS — N40.1 BENIGN PROSTATIC HYPERPLASIA WITH NOCTURIA: Primary | ICD-10-CM

## 2022-10-07 DIAGNOSIS — R35.1 BENIGN PROSTATIC HYPERPLASIA WITH NOCTURIA: Primary | ICD-10-CM

## 2022-10-07 LAB
BILIRUBIN URINE: NEGATIVE
BLOOD URINE, POC: NEGATIVE
CHARACTER, URINE: CLEAR
COLOR, URINE: YELLOW
GLUCOSE URINE: NEGATIVE MG/DL
KETONES, URINE: NEGATIVE
LEUKOCYTE CLUMPS, URINE: NEGATIVE
NITRITE, URINE: NEGATIVE
PH, URINE: 6.5 (ref 5–9)
PROTEIN, URINE: NEGATIVE MG/DL
SPECIFIC GRAVITY, URINE: 1.02 (ref 1–1.03)
UROBILINOGEN, URINE: 0.2 EU/DL (ref 0–1)

## 2022-10-07 PROCEDURE — G8427 DOCREV CUR MEDS BY ELIG CLIN: HCPCS | Performed by: NURSE PRACTITIONER

## 2022-10-07 PROCEDURE — G8484 FLU IMMUNIZE NO ADMIN: HCPCS | Performed by: NURSE PRACTITIONER

## 2022-10-07 PROCEDURE — 99214 OFFICE O/P EST MOD 30 MIN: CPT | Performed by: NURSE PRACTITIONER

## 2022-10-07 PROCEDURE — G8417 CALC BMI ABV UP PARAM F/U: HCPCS | Performed by: NURSE PRACTITIONER

## 2022-10-07 PROCEDURE — 3017F COLORECTAL CA SCREEN DOC REV: CPT | Performed by: NURSE PRACTITIONER

## 2022-10-07 PROCEDURE — 1123F ACP DISCUSS/DSCN MKR DOCD: CPT | Performed by: NURSE PRACTITIONER

## 2022-10-07 PROCEDURE — 81003 URINALYSIS AUTO W/O SCOPE: CPT | Performed by: NURSE PRACTITIONER

## 2022-10-07 PROCEDURE — 1036F TOBACCO NON-USER: CPT | Performed by: NURSE PRACTITIONER

## 2022-10-07 RX ORDER — TAMSULOSIN HYDROCHLORIDE 0.4 MG/1
0.4 CAPSULE ORAL DAILY
Qty: 90 CAPSULE | Refills: 3 | Status: SHIPPED | OUTPATIENT
Start: 2022-10-07

## 2022-10-07 ASSESSMENT — ENCOUNTER SYMPTOMS
NAUSEA: 0
VOMITING: 0
ABDOMINAL PAIN: 0
BACK PAIN: 0

## 2022-10-07 NOTE — PROGRESS NOTES
[address] [facility].  [address]  [facility] [address]  Dept: [phone]  Loc: [phone]    Visit Date: 10/7/2022        HPI:     Neil Dunne is a 67 y.o. male who presents today for:  Chief Complaint   Patient presents with    Follow-up    Elevated PSA    Benign Prostatic Hypertrophy       HPI  Pt seen in follow up for BPH and elevated psa. Pt moved to [address] [place] [address] from [place] in 2018 where he was following with a Dr. Sherine Norwood at [facility]. Prior PSAs noted of 3.78 4/28/17 and 2.86 11/30/17. PSA 9/14/18 3. 18. Pt denies any family hx of breast or prostate cancer. No known abnormal TANISHA in the past.  He has been on Flomax 0.4 mg daily since December of 2017. Pt reports urinary symptoms stable. Wakes 2 x per night. Has IPSS score today of 7/35. Notes mostly satisfied with QOL secondary to urinary symptoms. Current Outpatient Medications   Medication Sig Dispense Refill    atorvastatin (LIPITOR) 20 MG tablet Take 1 tablet by mouth nightly 90 tablet 2    Coenzyme Q10 (CO Q 10 PO) Take by mouth      terbinafine (LAMISIL) 250 MG tablet Take 250 mg by mouth daily      amLODIPine (NORVASC) 2.5 MG tablet Take 1 tablet by mouth daily 90 tablet 2    triamcinolone (KENALOG) 0.1 % cream Apply topically Daily      hydrOXYzine HCl (ATARAX) 10 MG tablet Take by mouth daily as needed      Melatonin 10 MG TABS Take by mouth daily as needed      tamsulosin (FLOMAX) 0.4 MG capsule Take 1 capsule by mouth daily 90 capsule 3    zinc sulfate (ZINCATE) 220 (50 Zn) MG capsule Take 1 capsule by mouth daily 30 capsule 3    vitamin C (VITAMIN C) 1000 MG tablet Take 1 tablet by mouth daily 30 tablet 3    Vitamin D (CHOLECALCIFEROL) 50 MCG (2000 UT) TABS tablet Take 1 tablet by mouth daily 60 tablet     ketoconazole (NIZORAL) 2 % shampoo Apply topically daily as needed for Itching Apply topically daily as needed.       Multiple Vitamins-Minerals (MULTIVITAMIN-MINERALS) TABS tablet Take 1 tablet by mouth daily      aspirin EC 81 MG EC tablet Take 81 mg by mouth daily       No current facility-administered medications for this visit. Past Medical History  Gallardo  has a past medical history of BPH (benign prostatic hyperplasia), COVID-19, Hepatitis B, Hyperlipidemia, and Psoriasis. Past Surgical History  The patient  has a past surgical history that includes back surgery (2015) and Mouth surgery (1968). Family History  This patient's family history includes Asthma in his father; Cancer in his brother and sister; Emphysema in his father; High Cholesterol in his mother. Social History  Gallardo  reports that he has never smoked. He has never used smokeless tobacco. He reports that he does not drink alcohol and does not use drugs. Subjective:      Review of Systems   Constitutional:  Negative for activity change, appetite change, chills, diaphoresis, fatigue, fever and unexpected weight change. Gastrointestinal:  Negative for abdominal pain, nausea and vomiting. Genitourinary:  Positive for frequency and urgency. Negative for decreased urine volume, difficulty urinating, dysuria, flank pain and hematuria. Musculoskeletal:  Negative for back pain. Objective:   BP (!) 148/84   Ht 5' 9\" (1.753 m)   Wt 183 lb (83 kg)   BMI 27.02 kg/m²     Physical Exam  Vitals reviewed. Constitutional:       General: He is not in acute distress. Appearance: Normal appearance. He is well-developed. He is not ill-appearing or diaphoretic. HENT:      Head: Normocephalic and atraumatic. Right Ear: External ear normal.      Left Ear: External ear normal.      Nose: Nose normal.      Mouth/Throat:      Mouth: Mucous membranes are moist.   Eyes:      General: No scleral icterus. Right eye: No discharge. Left eye: No discharge. Neck:      Vascular: No JVD. Trachea: No tracheal deviation.    Pulmonary:      Effort: Pulmonary effort is normal. No respiratory distress. Abdominal:      General: There is no distension. Tenderness: There is no abdominal tenderness. There is no right CVA tenderness or left CVA tenderness. Genitourinary:     Comments: Prostate moderately enlarged with right lobe larger than left lobe and smooth without nodule or induration. Musculoskeletal:         General: Normal range of motion. Neurological:      Mental Status: He is alert and oriented to person, place, and time. Mental status is at baseline. Psychiatric:         Mood and Affect: Mood normal.         Behavior: Behavior normal.         Thought Content: Thought content normal.       POC  Results for POC orders placed in visit on 10/07/22   POCT Urinalysis No Micro (Auto)   Result Value Ref Range    Glucose, Ur Negative NEGATIVE mg/dl    Bilirubin Urine Negative     Ketones, Urine Negative NEGATIVE    Specific Gravity, Urine 1.020 1.002 - 1.030    Blood, UA POC Negative NEGATIVE    pH, Urine 6.50 5.0 - 9.0    Protein, Urine Negative NEGATIVE mg/dl    Urobilinogen, Urine 0.20 0.0 - 1.0 eu/dl    Nitrite, Urine Negative NEGATIVE    Leukocyte Clumps, Urine Negative NEGATIVE    Color, Urine Yellow [id number]    Character, Urine Clear [id number]       Patients recent PSA values are as follows  Lab Results   Component Value Date    PSA 3.35 (H) 09/08/2022    PSA 3.70 (H) 10/08/2021    PSA 3.11 (H) 09/02/2020        Recent BUN/Creatinine:  Lab Results   Component Value Date/Time    BUN 26 06/11/2022 03:36 AM    CREATININE 1.1 06/11/2022 03:36 AM       Assessment/Plan:   BPH with LUTs--Continue Tamsulosin 0.4 mg daily--refill sent to pharmacy. Elevated PSA--PSA currently trending down to 3.35. TANISHA without nodule or induration. Stable PSA at this time and not unexpected for patient's age group. Repeat PSA in 1 year. F/u in 1 year with PSA a few days prior.

## 2022-11-07 ENCOUNTER — TELEPHONE (OUTPATIENT)
Dept: CARDIOLOGY CLINIC | Age: 72
End: 2022-11-07

## 2022-11-15 NOTE — ED PROVIDER NOTES
Assessment & Plan     Type 2 diabetes mellitus without complication, without long-term current use of insulin (H)  Follow-up 3 mos  utd with eye exam until spring  Isn't on an ACEi, we did not discuss this today  - Albumin Random Urine Quantitative with Creat Ratio; Future  - Lipid panel reflex to direct LDL Non-fasting; Future  - HEMOGLOBIN A1C; Future  - Comprehensive metabolic panel; Future  - TSH with free T4 reflex; Future  - ZZC FOOT EXAM  NO CHARGE  - Albumin Random Urine Quantitative with Creat Ratio  - Lipid panel reflex to direct LDL Non-fasting  - HEMOGLOBIN A1C  - Comprehensive metabolic panel  - TSH with free T4 reflex    Mixed hyperlipidemia  The 10-year ASCVD risk score (Therese TOLBERT, et al., 2019) is: 19.7%    Values used to calculate the score:      Age: 62 years      Sex: Male      Is Non- : No      Diabetic: Yes      Tobacco smoker: No      Systolic Blood Pressure: 120 mmHg      Is BP treated: No      HDL Cholesterol: 29 mg/dL      Total Cholesterol: 144 mg/dL  Needs to continue lipitor, sometimes forgets to take at bedtime discussed he can take in the AM  - Lipid panel reflex to direct LDL Non-fasting; Future  - atorvastatin (LIPITOR) 10 MG tablet; Take 1 tablet (10 mg) by mouth daily  - Comprehensive metabolic panel; Future  - Lipid panel reflex to direct LDL Non-fasting  - Comprehensive metabolic panel    Screening for HIV (human immunodeficiency virus)  declines    Need for hepatitis C screening test  due  - Hepatitis C Screen Reflex to HCV RNA Quant and Genotype; Future  - Hepatitis C Screen Reflex to HCV RNA Quant and Genotype    Hyperlipidemia LDL goal <100  See above    JAGUAR (obstructive sleep apnea)  Needs new machine, has had for 7 years  - Miscellaneous Order for DME - ONLY FOR DME    Paresthesias    - Vitamin B12; Future  - Vitamin B12    ACP (advance care planning)  Paperwork discussed    Hypovitaminosis D    - Vitamin D Deficiency; Future  - Vitamin D  [facility] [address] [id number] EMERGENCY DEPT  Urgent Care Encounter       CHIEF COMPLAINT       Chief Complaint   Patient presents with    Chest Pain     onset [id number]    Shortness of Breath       Nurses Notes reviewed and I agree except as noted in the HPI. HISTORY OF PRESENT ILLNESS   Deetta Goodell is a 66 y.o. male who presents with complaints of chest pain or shortness of breath. Pain started 15 to 20 minutes prior to arrival.  Patient states he was sitting at his computer and reach for the pressure and developed sudden onset of stabbing, midsternal chest pain. Pain was rated a 10/10. He also reported some dizziness at the time and some brief episodes of nausea. Patient denies any cardiac history. Patient denies any lung history as well. At the time of presentation, the pain was decreasing however patient remained short of breath and very anxious. The history is provided by the patient. REVIEW OF SYSTEMS     Review of Systems   Constitutional: Negative for chills and fever. Respiratory: Positive for shortness of breath. Negative for cough. Cardiovascular: Positive for chest pain. Gastrointestinal: Positive for nausea. Negative for vomiting. Skin: Negative for rash. Neurological: Positive for dizziness. Negative for headaches. PAST MEDICAL HISTORY         Diagnosis Date    BPH (benign prostatic hyperplasia)     COVID-19 11/12/2020    Hepatitis B     Hyperlipidemia     Psoriasis        SURGICALHISTORY     Patient  has a past surgical history that includes back surgery (2015) and Mouth surgery (1968).     CURRENT MEDICATIONS       Current Discharge Medication List      CONTINUE these medications which have NOT CHANGED    Details   tamsulosin (FLOMAX) 0.4 MG capsule Take 1 capsule by mouth daily  Qty: 90 capsule, Refills: 3    Associated Diagnoses: Benign prostatic hyperplasia with nocturia      zinc sulfate (ZINCATE) 220 (50 Zn) MG capsule Take 1 capsule by mouth daily  Qty: 30 capsule, "Deficiency    30 minutes spent on the date of the encounter doing chart review, history and exam, documentation and further activities per the note       BMI:   Estimated body mass index is 29.53 kg/m  as calculated from the following:    Height as of this encounter: 1.753 m (5' 9\").    Weight as of this encounter: 90.7 kg (200 lb).   Weight management plan: Discussed healthy diet and exercise guidelines    Patient was agreeable to this plan and had no further questions.  There are no Patient Instructions on file for this visit.    No follow-ups on file.    Mimi Bhatt MD  [facility] - MARISABEL Hopper is a 62 year old accompanied by his spouse, presenting for the following health issues:  Establish Care      HPI     Diabetes Follow-up    How often are you checking your blood sugar? Two times daily  Blood sugar testing frequency justification:  Risk of hypoglycemia with medication(s)  What time of day are you checking your blood sugars (select all that apply)?  Before meals and At bedtime  Have you had any blood sugars above 200?  No  Have you had any blood sugars below 70?  No    What symptoms do you notice when your blood sugar is low?  None    What concerns do you have today about your diabetes? None     Do you have any of these symptoms? (Select all that apply)  Numbness in feet      BP Readings from Last 2 Encounters:   11/16/22 120/58   10/04/22 150/90     Hemoglobin A1C (%)   Date Value   11/16/2022 6.8 (H)   07/25/2022 6.7 (A)   11/17/2021 7.6 (H)   01/17/2020 7.8 (A)     LDL Cholesterol Calculated (mg/dL)   Date Value   11/18/2019 70               Hyperlipidemia Follow-Up      Are you regularly taking any medication or supplement to lower your cholesterol?   Yes- Lipitor    Are you having muscle aches or other side effects that you think could be caused by your cholesterol lowering medication?  No      How many servings of fruits and vegetables do you eat daily?  2-3    On " Refills: 3      vitamin C (VITAMIN C) 1000 MG tablet Take 1 tablet by mouth daily  Qty: 30 tablet, Refills: 3      Vitamin D (CHOLECALCIFEROL) 50 MCG (2000 UT) TABS tablet Take 1 tablet by mouth daily  Qty: 60 tablet    Comments: Labeling may look different. 25 mcg=1000 Units. Please double check dosages. ketoconazole (NIZORAL) 2 % shampoo Apply topically daily as needed for Itching Apply topically daily as needed. Multiple Vitamins-Minerals (MULTIVITAMIN-MINERALS) TABS tablet Take 1 tablet by mouth daily      aspirin EC 81 MG EC tablet Take 81 mg by mouth daily             ALLERGIES     Patient is is allergic to crestor [rosuvastatin calcium] and benadryl [diphenhydramine]. Patients   Immunization History   Administered Date(s) Administered    Influenza Virus Vaccine 10/25/2019    Influenza, High Dose (Fluzone 65 yrs and older) 09/20/2021    Influenza, Quadv, IM, PF (6 mo and older Fluzone, Flulaval, Fluarix, and 3 yrs and older Afluria) 09/03/2020    Influenza, Quadv, adjuvanted, 65 yrs +, IM, PF (Fluad) 09/03/2020    Influenza, Triv, inactivated, subunit, adjuvanted, IM (Fluad 65 yrs and older) 09/07/2018    Pneumococcal Conjugate 13-valent ([id number]) 04/30/2019    Pneumococcal Polysaccharide ([id number]) 09/02/2020    Tdap (Boostrix, Adacel) 09/03/2020       FAMILY HISTORY     Patient's family history includes Asthma in his father; Cancer in his brother and sister; Emphysema in his father; High Cholesterol in his mother. SOCIAL HISTORY     Patient  reports that he has never smoked. He has never used smokeless tobacco. He reports that he does not drink alcohol and does not use drugs. PHYSICAL EXAM     ED TRIAGE VITALS   ,  ,  ,  ,  ,Estimated body mass index is 26.83 kg/m² as calculated from the following:    Height as of 10/8/21: 5' 10\" (1.778 m). Weight as of 10/8/21: 187 lb (84.8 kg). ,No LMP for male patient. Physical Exam  Vitals and nursing note reviewed.    Constitutional: "average, how many sweetened beverages do you drink each day (Examples: soda, juice, sweet tea, etc.  Do NOT count diet or artificially sweetened beverages)?   0    How many days per week do you exercise enough to make your heart beat faster? 3 or less    How many minutes a day do you exercise enough to make your heart beat faster? 9 or less    How many days per week do you miss taking your medication? 0- currently out of medication      Review of Systems   Constitutional, HEENT, cardiovascular, pulmonary, gi and gu systems are negative, except as otherwise noted.      Objective    /58   Pulse 71   Temp 97.4  F (36.3  C) (Tympanic)   Ht 1.753 m (5' 9\")   Wt 90.7 kg (200 lb)   SpO2 98%   BMI 29.53 kg/m    Body mass index is 29.53 kg/m .  Physical Exam   GENERAL: healthy, alert and no distress  NECK: no adenopathy, no asymmetry, masses, or scars and thyroid normal to palpation  RESP: lungs clear to auscultation - no rales, rhonchi or wheezes  CV: regular rate and rhythm, normal S1 S2, no S3 or S4, no murmur, click or rub, no peripheral edema and peripheral pulses strong  ABDOMEN: soft, nontender, no hepatosplenomegaly, no masses and bowel sounds normal  MS: no gross musculoskeletal defects noted, no edema  PSYCH: mentation appears normal, affect normal/bright  Diabetic foot exam: normal DP and PT pulses, no trophic changes or ulcerative lesions, normal sensory exam and normal monofilament exam    Results for orders placed or performed in visit on 11/16/22   Albumin Random Urine Quantitative with Creat Ratio     Status: Abnormal   Result Value Ref Range    Albumin Urine mg/L 35.2 mg/L    Albumin Urine mg/g Cr 46.32 (H) 0.00 - 17.00 mg/g Cr    Creatinine Urine mg/dL 76.0 mg/dL   HEMOGLOBIN A1C     Status: Abnormal   Result Value Ref Range    Estimated Average Glucose 148 mg/dL    Hemoglobin A1C 6.8 (H) <5.7 %   Comprehensive metabolic panel     Status: Abnormal   Result Value Ref Range    Sodium 138 136 - 145 " General: He is not in acute distress. Appearance: He is well-developed. HENT:      Head: Normocephalic and atraumatic. Cardiovascular:      Rate and Rhythm: Normal rate and regular rhythm. Heart sounds: Normal heart sounds, S1 normal and S2 normal.   Pulmonary:      Effort: Pulmonary effort is normal. No respiratory distress. Breath sounds: Normal breath sounds and air entry. Skin:     General: Skin is warm and dry. Neurological:      General: No focal deficit present. Mental Status: He is alert and oriented to person, place, and time. Psychiatric:         Mood and Affect: Mood is anxious. Speech: Speech normal.         Behavior: Behavior normal. Behavior is cooperative. DIAGNOSTIC RESULTS     Labs:No results found for this visit on 06/10/22. IMAGING:    No orders to display         EKG: Reviewed by this provider. Normal sinus rhythm with a rate of 71 bpm.  Frequent unifocal PVCs. There are some nonspecific ST and T wave abnormalities in the lateral leads which were not present on the previous EKG dated 11/17/2020      URGENT CARE COURSE:     There were no vitals filed for this visit. Medications   aspirin chewable tablet 324 mg (324 mg Oral Given 6/10/22 0856)            PROCEDURES:  None    FINAL IMPRESSION      1. Chest pain, unspecified type          DISPOSITION/ PLAN     Patient presents with chest pain and shortness of breath. Sudden onset of symptoms about 15 minutes prior to arrival.  Pain did nearly resolved prior to discharge. EKG did show some some T wave flattening in the lateral leads which were not present on his previous EKG. 4 baby aspirin were given and IV access was established. Patient needs evaluation in the emergency department and he did choose [facility]. Patient will travel via EMS. Report was called to the ER charge nurse.   All patient's questions were answered prior to discharge he left the urgent care center mmol/L    Potassium 3.8 3.4 - 5.3 mmol/L    Chloride 104 98 - 107 mmol/L    Carbon Dioxide (CO2) 24 22 - 29 mmol/L    Anion Gap 10 7 - 15 mmol/L    Urea Nitrogen 15.5 8.0 - 23.0 mg/dL    Creatinine 0.82 0.67 - 1.17 mg/dL    Calcium 9.2 8.8 - 10.2 mg/dL    Glucose 207 (H) 70 - 99 mg/dL    Alkaline Phosphatase 71 40 - 129 U/L    AST 21 10 - 50 U/L    ALT 45 10 - 50 U/L    Protein Total 7.1 6.4 - 8.3 g/dL    Albumin 4.8 3.5 - 5.2 g/dL    Bilirubin Total 0.4 <=1.2 mg/dL    GFR Estimate >90 >60 mL/min/1.73m2   TSH with free T4 reflex     Status: Normal   Result Value Ref Range    TSH 3.32 0.30 - 4.20 uIU/mL

## 2023-01-10 RX ORDER — ATORVASTATIN CALCIUM 20 MG/1
20 TABLET, FILM COATED ORAL NIGHTLY
Qty: 90 TABLET | Refills: 0 | Status: SHIPPED | OUTPATIENT
Start: 2023-01-10

## 2023-02-09 ENCOUNTER — OFFICE VISIT (OUTPATIENT)
Dept: CARDIOLOGY CLINIC | Age: 73
End: 2023-02-09
Payer: MEDICARE

## 2023-02-09 VITALS
HEIGHT: 70 IN | SYSTOLIC BLOOD PRESSURE: 134 MMHG | DIASTOLIC BLOOD PRESSURE: 72 MMHG | WEIGHT: 181.8 LBS | HEART RATE: 60 BPM | BODY MASS INDEX: 26.03 KG/M2

## 2023-02-09 DIAGNOSIS — E78.01 FAMILIAL HYPERCHOLESTEROLEMIA: ICD-10-CM

## 2023-02-09 DIAGNOSIS — I10 PRIMARY HYPERTENSION: Primary | ICD-10-CM

## 2023-02-09 PROCEDURE — 3075F SYST BP GE 130 - 139MM HG: CPT | Performed by: NUCLEAR MEDICINE

## 2023-02-09 PROCEDURE — G8427 DOCREV CUR MEDS BY ELIG CLIN: HCPCS | Performed by: NUCLEAR MEDICINE

## 2023-02-09 PROCEDURE — G8484 FLU IMMUNIZE NO ADMIN: HCPCS | Performed by: NUCLEAR MEDICINE

## 2023-02-09 PROCEDURE — 99213 OFFICE O/P EST LOW 20 MIN: CPT | Performed by: NUCLEAR MEDICINE

## 2023-02-09 PROCEDURE — G8417 CALC BMI ABV UP PARAM F/U: HCPCS | Performed by: NUCLEAR MEDICINE

## 2023-02-09 PROCEDURE — 3078F DIAST BP <80 MM HG: CPT | Performed by: NUCLEAR MEDICINE

## 2023-02-09 PROCEDURE — 3017F COLORECTAL CA SCREEN DOC REV: CPT | Performed by: NUCLEAR MEDICINE

## 2023-02-09 PROCEDURE — 1036F TOBACCO NON-USER: CPT | Performed by: NUCLEAR MEDICINE

## 2023-02-09 PROCEDURE — 1123F ACP DISCUSS/DSCN MKR DOCD: CPT | Performed by: NUCLEAR MEDICINE

## 2023-02-09 NOTE — PROGRESS NOTES
[facility] .  [address] [place] [id number]  Dept: [phone]  Dept Fax: [phone]  Loc: [phone]    Visit Date: 2/9/2023    Benjiman Felty is a 67 y.o. male who presents todayfor:  Chief Complaint   Patient presents with    6 Month Follow-Up    Hypertension    Hyperlipidemia   Know risk for CAD  No known CAD  No chest pain   No changes in breathing  BP is stable   No dizziness  No syncope  On hyperlipidemia        HPI:  HPI  Past Medical History:   Diagnosis Date    BPH (benign prostatic hyperplasia)     COVID-19 11/12/2020    Hepatitis B     Hyperlipidemia     Psoriasis       Past Surgical History:   Procedure Laterality Date    BACK SURGERY  2015    Herniated disk     MOUTH SURGERY  1968     Family History   Problem Relation Age of Onset    High Cholesterol Mother     Emphysema Father     Asthma Father     Cancer Sister     Cancer Brother      Social History     Tobacco Use    Smoking status: Never    Smokeless tobacco: Never    Tobacco comments:     As a child my parents were heavy smokers   Substance Use Topics    Alcohol use: Never      Current Outpatient Medications   Medication Sig Dispense Refill    atorvastatin (LIPITOR) 20 MG tablet TAKE 1 TABLET BY MOUTH NIGHTLY 90 tablet 0    tamsulosin (FLOMAX) 0.4 MG capsule Take 1 capsule by mouth daily 90 capsule 3    Coenzyme Q10 (CO Q 10 PO) Take by mouth      terbinafine (LAMISIL) 250 MG tablet Take 250 mg by mouth daily      amLODIPine (NORVASC) 2.5 MG tablet Take 1 tablet by mouth daily 90 tablet 2    triamcinolone (KENALOG) 0.1 % cream Apply topically Daily      hydrOXYzine HCl (ATARAX) 10 MG tablet Take by mouth daily as needed      Melatonin 10 MG TABS Take by mouth daily as needed      zinc sulfate (ZINCATE) 220 (50 Zn) MG capsule Take 1 capsule by mouth daily 30 capsule 3    vitamin C (VITAMIN C) 1000 MG tablet Take 1 tablet by mouth daily 30 tablet 3    Vitamin D (CHOLECALCIFEROL) 50 MCG (2000 UT) TABS tablet Take 1 tablet by mouth daily 60 tablet     ketoconazole (NIZORAL) 2 % shampoo Apply topically daily as needed for Itching Apply topically daily as needed. Multiple Vitamins-Minerals (MULTIVITAMIN-MINERALS) TABS tablet Take 1 tablet by mouth daily      aspirin EC 81 MG EC tablet Take 81 mg by mouth daily       No current facility-administered medications for this visit. Allergies   Allergen Reactions    Crestor [Rosuvastatin Calcium]     Benadryl [Diphenhydramine] Other (See Comments)     Jitter, hyperactivity if takes more than one day      Health Maintenance   Topic Date Due    COVID-19 Vaccine (1) Never done    Shingles vaccine (1 of 2) Never done    A1C test (Diabetic or Prediabetic)  07/07/2023    Lipids  07/07/2023    Depression Screen  09/01/2023    Annual Wellness Visit (AWV)  09/09/2023    Colorectal Cancer Screen  04/09/2026    DTaP/Tdap/Td vaccine (2 - Td or Tdap) 09/03/2030    Flu vaccine  Completed    Pneumococcal 65+ years Vaccine  Completed    Hepatitis C screen  Completed    Hepatitis A vaccine  Aged Out    Hib vaccine  Aged Out    Meningococcal (ACWY) vaccine  Aged Out       Subjective:  Review of Systems  General:   No fever, no chills, No fatigue or weight loss  Pulmonary:    No dyspnea, no wheezing  Cardiac:    Denies recent chest pain,   GI:     No nausea or vomiting, no abdominal pain  Neuro:    No dizziness or light headedness,   Musculoskeletal:  No recent active issues  Extremities:   No edema, no obvious claudication     Objective:  Physical Exam  /72   Pulse 60   Ht 5' 10\" (1.778 m)   Wt 181 lb 12.8 oz (82.5 kg)   BMI 26.09 kg/m²   General:   Well developed, well nourished  Lungs:   Clear to auscultation  Heart:    Normal S1 S2, Slight murmur.  no rubs, no gallops  Abdomen:   Soft, non tender, no organomegalies, positive bowel sounds  Extremities:   No edema, no cyanosis, good peripheral pulses  Neurological:   Awake, alert, oriented. No obvious focal deficits  Musculoskelatal:  No obvious deformities    Assessment:      Diagnosis Orders   1. Primary hypertension        2. Familial hypercholesterolemia        As above  Cardiac fair for now    Plan:  No follow-ups on file. As above  Continue risk factor modification and medical management  Thank you for allowing me to participate in the care of your patient. Please don't hesitate to contact me regarding any further issues related to the patient care    Orders Placed:  No orders of the defined types were placed in this encounter. Medications Prescribed:  No orders of the defined types were placed in this encounter. Discussed use, benefit, and side effects of prescribed medications. All patient questions answered. Pt voicedunderstanding. Instructed to continue current medications, diet and exercise. Continue risk factor modification and medical management. Patient agreed with treatment plan. Follow up as directed.     Electronically signedby Johnnie Martin MD on 2/9/2023 at 12:28 PM

## 2023-03-07 SDOH — ECONOMIC STABILITY: TRANSPORTATION INSECURITY
IN THE PAST 12 MONTHS, HAS LACK OF TRANSPORTATION KEPT YOU FROM MEETINGS, WORK, OR FROM GETTING THINGS NEEDED FOR DAILY LIVING?: NO

## 2023-03-07 SDOH — ECONOMIC STABILITY: HOUSING INSECURITY
IN THE LAST 12 MONTHS, WAS THERE A TIME WHEN YOU DID NOT HAVE A STEADY PLACE TO SLEEP OR SLEPT IN A SHELTER (INCLUDING NOW)?: NO

## 2023-03-07 SDOH — ECONOMIC STABILITY: FOOD INSECURITY: WITHIN THE PAST 12 MONTHS, YOU WORRIED THAT YOUR FOOD WOULD RUN OUT BEFORE YOU GOT MONEY TO BUY MORE.: PATIENT DECLINED

## 2023-03-07 SDOH — ECONOMIC STABILITY: FOOD INSECURITY: WITHIN THE PAST 12 MONTHS, THE FOOD YOU BOUGHT JUST DIDN'T LAST AND YOU DIDN'T HAVE MONEY TO GET MORE.: PATIENT DECLINED

## 2023-03-07 SDOH — ECONOMIC STABILITY: INCOME INSECURITY: HOW HARD IS IT FOR YOU TO PAY FOR THE VERY BASICS LIKE FOOD, HOUSING, MEDICAL CARE, AND HEATING?: PATIENT DECLINED

## 2023-03-09 ENCOUNTER — OFFICE VISIT (OUTPATIENT)
Dept: FAMILY MEDICINE CLINIC | Age: 73
End: 2023-03-09

## 2023-03-09 VITALS
OXYGEN SATURATION: 99 % | SYSTOLIC BLOOD PRESSURE: 136 MMHG | TEMPERATURE: 97.2 F | WEIGHT: 182.2 LBS | HEART RATE: 65 BPM | RESPIRATION RATE: 14 BRPM | HEIGHT: 70 IN | DIASTOLIC BLOOD PRESSURE: 81 MMHG | BODY MASS INDEX: 26.08 KG/M2

## 2023-03-09 DIAGNOSIS — I10 PRIMARY HYPERTENSION: Primary | ICD-10-CM

## 2023-03-09 DIAGNOSIS — R73.09 ELEVATED GLUCOSE: ICD-10-CM

## 2023-03-09 DIAGNOSIS — M54.2 NECK PAIN: ICD-10-CM

## 2023-03-09 DIAGNOSIS — E78.2 MIXED HYPERLIPIDEMIA: ICD-10-CM

## 2023-03-09 DIAGNOSIS — R73.03 PRE-DIABETES: ICD-10-CM

## 2023-03-09 PROBLEM — I20.89 OTHER FORMS OF ANGINA PECTORIS: Status: RESOLVED | Noted: 2022-06-23 | Resolved: 2023-03-09

## 2023-03-09 PROBLEM — I20.8 OTHER FORMS OF ANGINA PECTORIS (HCC): Status: RESOLVED | Noted: 2022-06-23 | Resolved: 2023-03-09

## 2023-03-09 RX ORDER — AMLODIPINE BESYLATE 2.5 MG/1
2.5 TABLET ORAL DAILY
Qty: 90 TABLET | Refills: 3 | Status: SHIPPED | OUTPATIENT
Start: 2023-03-09

## 2023-03-09 RX ORDER — ATORVASTATIN CALCIUM 20 MG/1
20 TABLET, FILM COATED ORAL NIGHTLY
Qty: 90 TABLET | Refills: 3 | Status: SHIPPED | OUTPATIENT
Start: 2023-03-09

## 2023-03-09 ASSESSMENT — PATIENT HEALTH QUESTIONNAIRE - PHQ9
SUM OF ALL RESPONSES TO PHQ9 QUESTIONS 1 & 2: 0
1. LITTLE INTEREST OR PLEASURE IN DOING THINGS: 0
SUM OF ALL RESPONSES TO PHQ QUESTIONS 1-9: 0
2. FEELING DOWN, DEPRESSED OR HOPELESS: 0

## 2023-03-09 ASSESSMENT — ENCOUNTER SYMPTOMS: SHORTNESS OF BREATH: 0

## 2023-03-09 NOTE — PROGRESS NOTES
[facility]  [facility]  [address] [facility]  Dept: [phone]  Dept Fax: [phone]  Loc: [phone]  PROGRESS NOTE      Visit Date: 3/9/2023    Mario David is a 67 y.o. male who presents today for:  Chief Complaint   Patient presents with    Hypertension     Wants to discuss a numbness/tingling that goes down the left side of the face and down the deltoid area, has been happening for about 6 weeks. Subjective:  Hypertension  Associated symptoms include neck pain. Pertinent negatives include no chest pain or shortness of breath. 6 month f/u    HTN: On Norvasc. Has pre-diabetes. Hyperlipidemia:   in July. On lipitor 20 mg    New problem    Numbness/tingling in left side of face. Lasts 5-10 seconds. Started 6 weeks ago. Radiates to lateral shoulder. Fall in [place]. No treatments    Review of Systems   Respiratory:  Negative for shortness of breath. Cardiovascular:  Negative for chest pain. Musculoskeletal:  Positive for neck pain. Neurological:  Positive for numbness. Negative for dizziness, weakness and light-headedness.    Patient Active Problem List   Diagnosis    Benign prostatic hyperplasia with nocturia    Mixed hyperlipidemia    Psoriasis    Pre-diabetes    COVID-19    Other forms of angina pectoris (HCC)     Past Medical History:   Diagnosis Date    BPH (benign prostatic hyperplasia)     COVID-19 11/12/2020    Hepatitis B     Hyperlipidemia     Psoriasis       Past Surgical History:   Procedure Laterality Date    BACK SURGERY  2015    Herniated disk     MOUTH SURGERY  1968     Family History   Problem Relation Age of Onset    High Cholesterol Mother     Emphysema Father     Asthma Father     Cancer Sister     Cancer Brother      Social History     Tobacco Use    Smoking status: Never    Smokeless tobacco: Never    Tobacco comments:     As a child my parents were heavy smokers   Substance Use Topics Alcohol use: Never      Current Outpatient Medications   Medication Sig Dispense Refill    atorvastatin (LIPITOR) 20 MG tablet TAKE 1 TABLET BY MOUTH NIGHTLY 90 tablet 0    tamsulosin (FLOMAX) 0.4 MG capsule Take 1 capsule by mouth daily 90 capsule 3    Coenzyme Q10 (CO Q 10 PO) Take by mouth      amLODIPine (NORVASC) 2.5 MG tablet Take 1 tablet by mouth daily 90 tablet 2    triamcinolone (KENALOG) 0.1 % cream Apply topically Daily      hydrOXYzine HCl (ATARAX) 10 MG tablet Take by mouth daily as needed      Melatonin 10 MG TABS Take by mouth daily as needed      zinc sulfate (ZINCATE) 220 (50 Zn) MG capsule Take 1 capsule by mouth daily 30 capsule 3    vitamin C (VITAMIN C) 1000 MG tablet Take 1 tablet by mouth daily 30 tablet 3    Vitamin D (CHOLECALCIFEROL) 50 MCG (2000 UT) TABS tablet Take 1 tablet by mouth daily 60 tablet     ketoconazole (NIZORAL) 2 % shampoo Apply topically daily as needed for Itching Apply topically daily as needed. Multiple Vitamins-Minerals (MULTIVITAMIN-MINERALS) TABS tablet Take 1 tablet by mouth daily      aspirin EC 81 MG EC tablet Take 81 mg by mouth daily       No current facility-administered medications for this visit.      Allergies   Allergen Reactions    Crestor [Rosuvastatin Calcium]     Benadryl [Diphenhydramine] Other (See Comments)     Jitter, hyperactivity if takes more than one day      Health Maintenance   Topic Date Due    COVID-19 Vaccine (1) Never done    Shingles vaccine (1 of 2) Never done    A1C test (Diabetic or Prediabetic)  07/07/2023    Lipids  07/07/2023    Depression Screen  09/01/2023    Annual Wellness Visit (AWV)  09/09/2023    Colorectal Cancer Screen  04/09/2026    DTaP/Tdap/Td vaccine (2 - Td or Tdap) 09/03/2030    Flu vaccine  Completed    Pneumococcal 65+ years Vaccine  Completed    Hepatitis C screen  Completed    Hepatitis A vaccine  Aged Out    Hib vaccine  Aged Out    Meningococcal (ACWY) vaccine  Aged Out       Objective:  /81   Pulse 65   Temp 97.2 °F (36.2 °C)   Resp 14   Ht 5' 10\" (1.778 m)   Wt 182 lb 3.2 oz (82.6 kg)   SpO2 99%   BMI 26.14 kg/m²   Physical Exam  Vitals reviewed. Constitutional:       Appearance: He is well-developed. He is not ill-appearing. Cardiovascular:      Rate and Rhythm: Normal rate and regular rhythm. Heart sounds: No murmur heard. Pulmonary:      Effort: Pulmonary effort is normal. No respiratory distress. Breath sounds: Normal breath sounds. No wheezing or rhonchi. Musculoskeletal:      Right lower leg: No edema. Left lower leg: No edema. Neurological:      Mental Status: He is alert. Mental status is at baseline. Psychiatric:         Mood and Affect: Mood normal.         Behavior: Behavior normal.     Symmetric smile  Neck: Decreased lateral flexion bilaterally    Impression/Plan:  1. Primary hypertension  Chronic. Controlled. Continue Norvasc. Check labs  - amLODIPine (NORVASC) 2.5 MG tablet; Take 1 tablet by mouth daily  Dispense: 90 tablet; Refill: 3  - Comprehensive Metabolic Panel; Future  - CBC; Future    2. Mixed hyperlipidemia  Chronic. Check status of control with lipid panel. Continue Lipitor  - atorvastatin (LIPITOR) 20 MG tablet; Take 1 tablet by mouth nightly  Dispense: 90 tablet; Refill: 3  - Lipid Panel; Future  - Comprehensive Metabolic Panel; Future    3. Pre-diabetes  Chronic. Check A1c  - Hemoglobin A1C; Future    4. Elevated glucose  - Hemoglobin A1C; Future    5. Neck pain  New problem. Likely arthritis with some nerve irritation. No focal neurological weakness. Home exercise program provided. Consider x-ray and PT if not improving    They voiced understanding. All questions answered. They agreed with treatment plan. See patient instructions for any educational materials that may have been given. Discussed use, benefit, and side effects of prescribed medications. Reviewed health maintenance.     (Please note that portions of this note may have been completed with a voice recognition program.  Efforts were made to edit the dictation but occasionally words are mis-transcribed.)    Return in about 6 months (around 9/9/2023) for medicare wellness.       Electronically signed by Ashlee Salgado MD on 3/9/2023 at 9:29 AM

## 2023-04-29 ENCOUNTER — HOSPITAL ENCOUNTER (EMERGENCY)
Age: 73
Discharge: HOME | End: 2023-04-29
Payer: MEDICARE

## 2023-04-29 ENCOUNTER — APPOINTMENT (OUTPATIENT)
Dept: GENERAL RADIOLOGY | Age: 73
End: 2023-04-29
Payer: MEDICARE

## 2023-04-29 VITALS
WEIGHT: 175 LBS | TEMPERATURE: 98.6 F | BODY MASS INDEX: 25.11 KG/M2 | RESPIRATION RATE: 20 BRPM | HEART RATE: 82 BPM | DIASTOLIC BLOOD PRESSURE: 85 MMHG | OXYGEN SATURATION: 96 % | SYSTOLIC BLOOD PRESSURE: 175 MMHG

## 2023-04-29 DIAGNOSIS — S93.601A SPRAIN OF RIGHT FOOT, INITIAL ENCOUNTER: Primary | ICD-10-CM

## 2023-04-29 PROCEDURE — 99202 OFFICE O/P NEW SF 15 MIN: CPT | Performed by: NURSE PRACTITIONER

## 2023-04-29 PROCEDURE — 73630 X-RAY EXAM OF FOOT: CPT

## 2023-04-29 PROCEDURE — 99213 OFFICE O/P EST LOW 20 MIN: CPT

## 2023-04-29 RX ORDER — IBUPROFEN 200 MG
400 TABLET ORAL EVERY 8 HOURS PRN
Qty: 120 TABLET | Refills: 3 | COMMUNITY
Start: 2023-04-29

## 2023-04-29 RX ORDER — METHYLPREDNISOLONE 4 MG/1
TABLET ORAL
Qty: 1 KIT | Refills: 0 | Status: SHIPPED | OUTPATIENT
Start: 2023-04-29 | End: 2023-05-05

## 2023-04-29 ASSESSMENT — PAIN DESCRIPTION - LOCATION: LOCATION: FOOT

## 2023-04-29 ASSESSMENT — PAIN DESCRIPTION - FREQUENCY: FREQUENCY: CONTINUOUS

## 2023-04-29 ASSESSMENT — PAIN DESCRIPTION - DESCRIPTORS: DESCRIPTORS: PRESSURE;ACHING

## 2023-04-29 ASSESSMENT — PAIN DESCRIPTION - PAIN TYPE: TYPE: ACUTE PAIN

## 2023-04-29 ASSESSMENT — ENCOUNTER SYMPTOMS: SHORTNESS OF BREATH: 0

## 2023-04-29 ASSESSMENT — PAIN - FUNCTIONAL ASSESSMENT
PAIN_FUNCTIONAL_ASSESSMENT: ACTIVITIES ARE NOT PREVENTED
PAIN_FUNCTIONAL_ASSESSMENT: 0-10

## 2023-04-29 ASSESSMENT — PAIN DESCRIPTION - ORIENTATION: ORIENTATION: RIGHT

## 2023-04-29 ASSESSMENT — PAIN SCALES - GENERAL: PAINLEVEL_OUTOF10: 8

## 2023-04-29 NOTE — ED TRIAGE NOTES
Patient ambulated to room with c/o right foot pain beginning yesterday. Denies injury. No redness or edema noted at this time. Increased pain with touch or pressure.

## 2023-04-29 NOTE — ED PROVIDER NOTES
DAILY, Historical Med      hydrOXYzine HCl (ATARAX) 10 MG tablet Take by mouth daily as neededHistorical Med      Melatonin 10 MG TABS Take by mouth daily as neededHistorical Med      zinc sulfate (ZINCATE) 220 (50 Zn) MG capsule Take 1 capsule by mouth daily, Disp-30 capsule,R-3OTC      vitamin C (VITAMIN C) 1000 MG tablet Take 1 tablet by mouth daily, Disp-30 tablet,R-3OTC      Vitamin D (CHOLECALCIFEROL) 50 MCG (2000 UT) TABS tablet Take 1 tablet by mouth daily, Disp-60 tabletLabeling may look different. 25 mcg=1000 Units. Please double check dosages. OTC      ketoconazole (NIZORAL) 2 % shampoo Apply topically daily as needed for Itching Apply topically daily as needed., Topical, DAILY PRN, Historical Med      Multiple Vitamins-Minerals (MULTIVITAMIN-MINERALS) TABS tablet Take 1 tablet by mouth dailyHistorical Med      aspirin EC 81 MG EC tablet Take 81 mg by mouth dailyHistorical Med             ALLERGIES     Patient is is allergic to crestor [rosuvastatin calcium] and benadryl [diphenhydramine]. Patients   Immunization History   Administered Date(s) Administered    Influenza Virus Vaccine 10/25/2019    Influenza, FLUAD, (age 72 y+), Adjuvanted, 0.5mL 09/03/2020, 09/08/2022    Influenza, FLUARIX, FLULAVAL, FLUZONE (age 10 mo+) AND AFLURIA, (age 1 y+), PF, 0.5mL 09/03/2020    Influenza, High Dose (Fluzone 65 yrs and older) 09/20/2021    Influenza, Triv, inactivated, subunit, adjuvanted, IM (Fluad 65 yrs and older) 09/07/2018    Pneumococcal, PCV-13, PREVNAR 13, (age 6w+), IM, 0.5mL 04/30/2019    Pneumococcal, PPSV23, PNEUMOVAX 21, (age 2y+), SC/IM, 0.5mL 09/02/2020    TDaP, ADACEL (age 10y-63y), [address] (age 10y+), IM, 0.5mL 09/03/2020       FAMILY HISTORY     Patient's family history includes Asthma in his father; Cancer in his brother and sister; Emphysema in his father; High Cholesterol in his mother. SOCIAL HISTORY     Patient  reports that he has never smoked.  He has never used smokeless tobacco. He

## 2023-05-08 DIAGNOSIS — I10 PRIMARY HYPERTENSION: ICD-10-CM

## 2023-05-09 RX ORDER — AMLODIPINE BESYLATE 2.5 MG/1
2.5 TABLET ORAL DAILY
Qty: 90 TABLET | Refills: 3 | OUTPATIENT
Start: 2023-05-09

## 2023-07-20 ENCOUNTER — TELEPHONE (OUTPATIENT)
Dept: CARDIOLOGY CLINIC | Age: 73
End: 2023-07-20

## 2023-07-20 NOTE — TELEPHONE ENCOUNTER
Pre op Risk Assessment    Procedure dental extraction   Physician Dr Danny Cuevas  Date of surgery/procedure TBA    Last OV 2/9/23  Last Stress 6/17/22  Last Echo 6/17/22  Last Cath none in chart   Last Stent none in chart   Is patient on blood thinners asa  Hold Meds/how many days  ? ?

## 2023-07-31 ENCOUNTER — HOSPITAL ENCOUNTER (OUTPATIENT)
Age: 73
Discharge: HOME | End: 2023-07-31
Payer: MEDICARE

## 2023-07-31 DIAGNOSIS — K76.89 COMPENSATORY LOBAR HYPERPLASIA OF LIVER: ICD-10-CM

## 2023-07-31 DIAGNOSIS — R73.09 ELEVATED GLUCOSE: ICD-10-CM

## 2023-07-31 DIAGNOSIS — E78.2 MIXED HYPERLIPIDEMIA: ICD-10-CM

## 2023-07-31 DIAGNOSIS — R73.03 PRE-DIABETES: ICD-10-CM

## 2023-07-31 DIAGNOSIS — I10 PRIMARY HYPERTENSION: ICD-10-CM

## 2023-07-31 LAB
ALBUMIN SERPL BCG-MCNC: 4.6 G/DL (ref 3.5–5.1)
ALP SERPL-CCNC: 122 U/L (ref 38–126)
ALT SERPL W/O P-5'-P-CCNC: 22 U/L (ref 11–66)
ANION GAP SERPL CALC-SCNC: 10 MEQ/L (ref 8–16)
AST SERPL-CCNC: 16 U/L (ref 5–40)
BILIRUB SERPL-MCNC: 0.3 MG/DL (ref 0.3–1.2)
BUN SERPL-MCNC: 24 MG/DL (ref 7–22)
CALCIUM SERPL-MCNC: 9.8 MG/DL (ref 8.5–10.5)
CHLORIDE SERPL-SCNC: 103 MEQ/L (ref 98–111)
CHOLEST SERPL-MCNC: 174 MG/DL (ref 100–199)
CO2 SERPL-SCNC: 28 MEQ/L (ref 23–33)
CREAT SERPL-MCNC: 1.2 MG/DL (ref 0.4–1.2)
DEPRECATED MEAN GLUCOSE BLD GHB EST-ACNC: 126 MG/DL (ref 70–126)
DEPRECATED RDW RBC AUTO: 43.1 FL (ref 35–45)
ERYTHROCYTE [DISTWIDTH] IN BLOOD BY AUTOMATED COUNT: 12.9 % (ref 11.5–14.5)
GFR SERPL CREATININE-BSD FRML MDRD: > 60 ML/MIN/1.73M2
GLUCOSE SERPL-MCNC: 105 MG/DL (ref 70–108)
HBA1C MFR BLD HPLC: 6.2 % (ref 4.4–6.4)
HCT VFR BLD AUTO: 43 % (ref 42–52)
HDLC SERPL-MCNC: 40 MG/DL
HGB BLD-MCNC: 14 GM/DL (ref 14–18)
LDLC SERPL CALC-MCNC: 98 MG/DL
MCH RBC QN AUTO: 29.7 PG (ref 26–33)
MCHC RBC AUTO-ENTMCNC: 32.6 GM/DL (ref 32.2–35.5)
MCV RBC AUTO: 91.3 FL (ref 80–94)
PLATELET # BLD AUTO: 160 THOU/MM3 (ref 130–400)
PMV BLD AUTO: 11.9 FL (ref 9.4–12.4)
POTASSIUM SERPL-SCNC: 4.5 MEQ/L (ref 3.5–5.2)
PROT SERPL-MCNC: 7.2 G/DL (ref 6.1–8)
RBC # BLD AUTO: 4.71 MILL/MM3 (ref 4.7–6.1)
SODIUM SERPL-SCNC: 141 MEQ/L (ref 135–145)
TRIGL SERPL-MCNC: 181 MG/DL (ref 0–199)
WBC # BLD AUTO: 5.2 THOU/MM3 (ref 4.8–10.8)

## 2023-07-31 PROCEDURE — 80061 LIPID PANEL: CPT

## 2023-07-31 PROCEDURE — 36415 COLL VENOUS BLD VENIPUNCTURE: CPT

## 2023-07-31 PROCEDURE — 83036 HEMOGLOBIN GLYCOSYLATED A1C: CPT

## 2023-07-31 PROCEDURE — 80053 COMPREHEN METABOLIC PANEL: CPT

## 2023-07-31 PROCEDURE — 85027 COMPLETE CBC AUTOMATED: CPT

## 2023-08-18 ENCOUNTER — HOSPITAL ENCOUNTER (OUTPATIENT)
Dept: CT IMAGING | Age: 73
Discharge: HOME | End: 2023-08-18
Payer: MEDICARE

## 2023-08-18 DIAGNOSIS — K76.89 COMPENSATORY LOBAR HYPERPLASIA OF LIVER: ICD-10-CM

## 2023-08-18 PROCEDURE — 74177 CT ABD & PELVIS W/CONTRAST: CPT

## 2023-08-18 PROCEDURE — 6360000004 HC RX CONTRAST MEDICATION: Performed by: NURSE PRACTITIONER

## 2023-08-18 RX ADMIN — IOPAMIDOL 85 ML: 755 INJECTION, SOLUTION INTRAVENOUS at 08:26

## 2023-09-07 SDOH — HEALTH STABILITY: PHYSICAL HEALTH: ON AVERAGE, HOW MANY MINUTES DO YOU ENGAGE IN EXERCISE AT THIS LEVEL?: 10 MIN

## 2023-09-07 SDOH — HEALTH STABILITY: PHYSICAL HEALTH: ON AVERAGE, HOW MANY DAYS PER WEEK DO YOU ENGAGE IN MODERATE TO STRENUOUS EXERCISE (LIKE A BRISK WALK)?: 1 DAY

## 2023-09-07 ASSESSMENT — LIFESTYLE VARIABLES
HOW OFTEN DO YOU HAVE SIX OR MORE DRINKS ON ONE OCCASION: 1
HOW OFTEN DO YOU HAVE A DRINK CONTAINING ALCOHOL: NEVER
HOW OFTEN DO YOU HAVE A DRINK CONTAINING ALCOHOL: 1
HOW MANY STANDARD DRINKS CONTAINING ALCOHOL DO YOU HAVE ON A TYPICAL DAY: 0
HOW MANY STANDARD DRINKS CONTAINING ALCOHOL DO YOU HAVE ON A TYPICAL DAY: PATIENT DOES NOT DRINK

## 2023-09-07 ASSESSMENT — PATIENT HEALTH QUESTIONNAIRE - PHQ9
SUM OF ALL RESPONSES TO PHQ QUESTIONS 1-9: 0
SUM OF ALL RESPONSES TO PHQ QUESTIONS 1-9: 0
2. FEELING DOWN, DEPRESSED OR HOPELESS: 0
SUM OF ALL RESPONSES TO PHQ9 QUESTIONS 1 & 2: 0
SUM OF ALL RESPONSES TO PHQ QUESTIONS 1-9: 0
SUM OF ALL RESPONSES TO PHQ QUESTIONS 1-9: 0
1. LITTLE INTEREST OR PLEASURE IN DOING THINGS: 0

## 2023-09-11 ENCOUNTER — OFFICE VISIT (OUTPATIENT)
Dept: FAMILY MEDICINE CLINIC | Age: 73
End: 2023-09-11
Payer: MEDICARE

## 2023-09-11 VITALS
WEIGHT: 182.8 LBS | DIASTOLIC BLOOD PRESSURE: 80 MMHG | OXYGEN SATURATION: 98 % | HEIGHT: 70 IN | BODY MASS INDEX: 26.17 KG/M2 | RESPIRATION RATE: 18 BRPM | TEMPERATURE: 97.1 F | HEART RATE: 60 BPM | SYSTOLIC BLOOD PRESSURE: 139 MMHG

## 2023-09-11 DIAGNOSIS — Z00.00 MEDICARE ANNUAL WELLNESS VISIT, SUBSEQUENT: Primary | ICD-10-CM

## 2023-09-11 DIAGNOSIS — R73.03 PRE-DIABETES: ICD-10-CM

## 2023-09-11 DIAGNOSIS — I10 PRIMARY HYPERTENSION: ICD-10-CM

## 2023-09-11 PROCEDURE — G0439 PPPS, SUBSEQ VISIT: HCPCS | Performed by: FAMILY MEDICINE

## 2023-09-11 PROCEDURE — 3079F DIAST BP 80-89 MM HG: CPT | Performed by: FAMILY MEDICINE

## 2023-09-11 PROCEDURE — 3017F COLORECTAL CA SCREEN DOC REV: CPT | Performed by: FAMILY MEDICINE

## 2023-09-11 PROCEDURE — 1123F ACP DISCUSS/DSCN MKR DOCD: CPT | Performed by: FAMILY MEDICINE

## 2023-09-11 PROCEDURE — 3075F SYST BP GE 130 - 139MM HG: CPT | Performed by: FAMILY MEDICINE

## 2023-09-11 RX ORDER — CHLORHEXIDINE GLUCONATE 0.12 MG/ML
RINSE ORAL
COMMUNITY
Start: 2023-08-11

## 2023-09-11 RX ORDER — HYDROCODONE BITARTRATE AND ACETAMINOPHEN 5; 325 MG/1; MG/1
TABLET ORAL
COMMUNITY
Start: 2023-08-11 | End: 2023-09-11 | Stop reason: ALTCHOICE

## 2023-09-11 RX ORDER — AMOXICILLIN 500 MG/1
500 CAPSULE ORAL 3 TIMES DAILY
COMMUNITY
Start: 2023-08-11 | End: 2023-09-11 | Stop reason: ALTCHOICE

## 2023-09-11 ASSESSMENT — PATIENT HEALTH QUESTIONNAIRE - PHQ9
SUM OF ALL RESPONSES TO PHQ QUESTIONS 1-9: 0
1. LITTLE INTEREST OR PLEASURE IN DOING THINGS: 0
SUM OF ALL RESPONSES TO PHQ9 QUESTIONS 1 & 2: 0
SUM OF ALL RESPONSES TO PHQ QUESTIONS 1-9: 0
SUM OF ALL RESPONSES TO PHQ QUESTIONS 1-9: 0
2. FEELING DOWN, DEPRESSED OR HOPELESS: 0
SUM OF ALL RESPONSES TO PHQ QUESTIONS 1-9: 0

## 2023-09-11 ASSESSMENT — LIFESTYLE VARIABLES
HOW MANY STANDARD DRINKS CONTAINING ALCOHOL DO YOU HAVE ON A TYPICAL DAY: PATIENT DOES NOT DRINK
HOW OFTEN DO YOU HAVE A DRINK CONTAINING ALCOHOL: NEVER

## 2023-09-11 NOTE — PROGRESS NOTES
Medicare Annual Wellness Visit    Guerline Srivastava is here for Medicare AWV (Denies any issues or concerns) and Immunizations (2nd Shingles vaccine if due )    Assessment & Plan   Medicare annual wellness visit, subsequent  Primary hypertension  Pre-diabetes    Chronic problems are controlled. No medication changes. Recommend covid vaccine  Shingles vaccine through pharmacy    Recommendations for Preventive Services Due: see orders and patient instructions/AVS.  Recommended screening schedule for the next 5-10 years is provided to the patient in written form: see Patient Instructions/AVS.     Return in about 6 months (around 3/11/2024) for HTN. Subjective       No concerns    Patient's complete Health Risk Assessment and screening values have been reviewed and are found in Flowsheets. The following problems were reviewed today and where indicated follow up appointments were made and/or referrals ordered. Positive Risk Factor Screenings with Interventions:    Fall Risk:  Do you feel unsteady or are you worried about falling? : (!) yes  2 or more falls in past year?: no  Fall with injury in past year?: no     Interventions:    Patient declines any further evaluation or treatment             Opioid Risk: (Low risk score <55) Opioid risk score: 3    Patient is low risk for opioid use disorder or overdose. Last PDMP Brianne House as Reviewed:  Review User Review Instant Review Result                  General HRA Questions:  Select all that apply: (!) New or Increased Fatigue    Fatigue Interventions:  Patient comments: cares for wife who has dementia  Patient declined any further interventions or treatment          Vision Screen:  Do you have difficulty driving, watching TV, or doing any of your daily activities because of your eyesight?: (!) Yes  Have you had an eye exam within the past year?: Yes  No results found.     Interventions:   Patient declines any further evaluation or treatment                      Objective

## 2023-10-10 ENCOUNTER — OFFICE VISIT (OUTPATIENT)
Dept: UROLOGY | Age: 73
End: 2023-10-10
Payer: MEDICARE

## 2023-10-10 ENCOUNTER — NURSE ONLY (OUTPATIENT)
Dept: LAB | Age: 73
End: 2023-10-10

## 2023-10-10 ENCOUNTER — TELEPHONE (OUTPATIENT)
Dept: UROLOGY | Age: 73
End: 2023-10-10

## 2023-10-10 VITALS — BODY MASS INDEX: 26.05 KG/M2 | HEIGHT: 70 IN | WEIGHT: 182 LBS | RESPIRATION RATE: 16 BRPM

## 2023-10-10 DIAGNOSIS — R35.1 BENIGN PROSTATIC HYPERPLASIA WITH NOCTURIA: Primary | ICD-10-CM

## 2023-10-10 DIAGNOSIS — N40.1 BENIGN PROSTATIC HYPERPLASIA WITH NOCTURIA: Primary | ICD-10-CM

## 2023-10-10 DIAGNOSIS — N40.1 BENIGN PROSTATIC HYPERPLASIA WITH NOCTURIA: ICD-10-CM

## 2023-10-10 DIAGNOSIS — R35.1 BENIGN PROSTATIC HYPERPLASIA WITH NOCTURIA: ICD-10-CM

## 2023-10-10 DIAGNOSIS — R97.20 ELEVATED PSA: Primary | ICD-10-CM

## 2023-10-10 LAB
POST VOID RESIDUAL (PVR): 162 ML
PSA SERPL-MCNC: 3.79 NG/ML (ref 0–1)

## 2023-10-10 PROCEDURE — G8484 FLU IMMUNIZE NO ADMIN: HCPCS | Performed by: NURSE PRACTITIONER

## 2023-10-10 PROCEDURE — 1036F TOBACCO NON-USER: CPT | Performed by: NURSE PRACTITIONER

## 2023-10-10 PROCEDURE — 1123F ACP DISCUSS/DSCN MKR DOCD: CPT | Performed by: NURSE PRACTITIONER

## 2023-10-10 PROCEDURE — 99214 OFFICE O/P EST MOD 30 MIN: CPT | Performed by: NURSE PRACTITIONER

## 2023-10-10 PROCEDURE — 3017F COLORECTAL CA SCREEN DOC REV: CPT | Performed by: NURSE PRACTITIONER

## 2023-10-10 PROCEDURE — 51798 US URINE CAPACITY MEASURE: CPT | Performed by: NURSE PRACTITIONER

## 2023-10-10 PROCEDURE — G8427 DOCREV CUR MEDS BY ELIG CLIN: HCPCS | Performed by: NURSE PRACTITIONER

## 2023-10-10 PROCEDURE — G8417 CALC BMI ABV UP PARAM F/U: HCPCS | Performed by: NURSE PRACTITIONER

## 2023-10-10 RX ORDER — TAMSULOSIN HYDROCHLORIDE 0.4 MG/1
0.4 CAPSULE ORAL DAILY
Qty: 90 CAPSULE | Refills: 3 | Status: SHIPPED | OUTPATIENT
Start: 2023-10-10

## 2023-10-10 ASSESSMENT — ENCOUNTER SYMPTOMS
NAUSEA: 0
ABDOMINAL PAIN: 0
BACK PAIN: 0
VOMITING: 0

## 2023-10-10 NOTE — PROGRESS NOTES
[address] [facility].  [address]  [facility] [address]  Dept: [phone]  Loc: [phone]    Visit Date: 10/10/2023        HPI:     Sally Ellsworth is a 75 y.o. male who presents today for:  Chief Complaint   Patient presents with    Follow-up    Results     Review PSA       HPI    Pt seen in follow up for BPH and elevated psa. Pt moved to [place] from [place] in 2018 where he was following with a Dr. Roberta Magallon at [facility]. Prior PSAs noted of 3.78 4/28/17 and 2.86 11/30/17. PSA 9/14/18 3. 18. Pt denies any family hx of breast or prostate cancer. No known abnormal TANISHA in the past.  He has been on Flomax 0.4 mg daily since December of 2017. Pt reports urinary symptoms stable. Wakes 2-3 x per night. Has IPSS score today of 7/35 consistent with last year. Notes mostly satisfied with QOL secondary to urinary symptoms.         Current Outpatient Medications   Medication Sig Dispense Refill    tamsulosin (FLOMAX) 0.4 MG capsule Take 1 capsule by mouth daily 90 capsule 3    chlorhexidine (PERIDEX) 0.12 % solution RINSE WITH 1/2 OUNCE BY MOUTH FOR 30 SECONDS THEN SPIT OUT. use twice a day      ibuprofen (ADVIL) 200 MG tablet Take 2 tablets by mouth every 8 hours as needed for Pain 120 tablet 3    amLODIPine (NORVASC) 2.5 MG tablet Take 1 tablet by mouth daily 90 tablet 3    atorvastatin (LIPITOR) 20 MG tablet Take 1 tablet by mouth nightly 90 tablet 3    hydrOXYzine HCl (ATARAX) 10 MG tablet Take by mouth daily as needed      Melatonin 10 MG TABS Take by mouth daily as needed      zinc sulfate (ZINCATE) 220 (50 Zn) MG capsule Take 1 capsule by mouth daily 30 capsule 3    vitamin C (VITAMIN C) 1000 MG tablet Take 1 tablet by mouth daily 30 tablet 3    Vitamin D (CHOLECALCIFEROL) 50 MCG (2000 UT) TABS tablet Take 1 tablet by mouth daily 60 tablet     ketoconazole (NIZORAL) 2 % shampoo Apply topically daily as needed for Itching

## 2023-10-10 NOTE — TELEPHONE ENCOUNTER
Please let pt know psa consistent with prior values at 3.79. Repeat psa in one year. Please mail order.

## 2023-10-10 NOTE — TELEPHONE ENCOUNTER
Patient advised of the PSA results and to repeat it in 1 year. He voiced understanding. Order sent via mail.

## 2023-11-26 DIAGNOSIS — N40.1 BENIGN PROSTATIC HYPERPLASIA WITH NOCTURIA: ICD-10-CM

## 2023-11-26 DIAGNOSIS — R35.1 BENIGN PROSTATIC HYPERPLASIA WITH NOCTURIA: ICD-10-CM

## 2023-11-27 RX ORDER — TAMSULOSIN HYDROCHLORIDE 0.4 MG/1
CAPSULE ORAL DAILY
Qty: 90 CAPSULE | Refills: 3 | Status: SHIPPED | OUTPATIENT
Start: 2023-11-27

## 2023-11-27 NOTE — TELEPHONE ENCOUNTER
Sami Green called requesting a refill on the following medications:  Requested Prescriptions     Pending Prescriptions Disp Refills    tamsulosin (FLOMAX) 0.4 MG capsule [Pharmacy Med Name: Tamsulosin HCl Oral Capsule 0.4 MG] 90 capsule 0     Sig: TAKE 1 CAPSULE BY MOUTH EVERY DAY     Pharmacy verified:  [email or web address]      Date of last visit: 10/10/2023  Date of next visit (if applicable): 10/15/2024

## 2023-12-07 ENCOUNTER — OFFICE VISIT (OUTPATIENT)
Dept: FAMILY MEDICINE CLINIC | Age: 73
End: 2023-12-07
Payer: MEDICARE

## 2023-12-07 VITALS
HEIGHT: 70 IN | BODY MASS INDEX: 26.66 KG/M2 | SYSTOLIC BLOOD PRESSURE: 138 MMHG | OXYGEN SATURATION: 98 % | TEMPERATURE: 97.4 F | WEIGHT: 186.2 LBS | HEART RATE: 61 BPM | DIASTOLIC BLOOD PRESSURE: 70 MMHG | RESPIRATION RATE: 18 BRPM

## 2023-12-07 DIAGNOSIS — L29.9 ITCH: ICD-10-CM

## 2023-12-07 DIAGNOSIS — L40.9 PSORIASIS: ICD-10-CM

## 2023-12-07 DIAGNOSIS — R21 RASH: Primary | ICD-10-CM

## 2023-12-07 PROBLEM — U07.1 COVID-19: Status: RESOLVED | Noted: 2020-11-17 | Resolved: 2023-12-07

## 2023-12-07 PROCEDURE — 1123F ACP DISCUSS/DSCN MKR DOCD: CPT | Performed by: FAMILY MEDICINE

## 2023-12-07 PROCEDURE — G8427 DOCREV CUR MEDS BY ELIG CLIN: HCPCS | Performed by: FAMILY MEDICINE

## 2023-12-07 PROCEDURE — 3017F COLORECTAL CA SCREEN DOC REV: CPT | Performed by: FAMILY MEDICINE

## 2023-12-07 PROCEDURE — G8417 CALC BMI ABV UP PARAM F/U: HCPCS | Performed by: FAMILY MEDICINE

## 2023-12-07 PROCEDURE — 1036F TOBACCO NON-USER: CPT | Performed by: FAMILY MEDICINE

## 2023-12-07 PROCEDURE — 99213 OFFICE O/P EST LOW 20 MIN: CPT | Performed by: FAMILY MEDICINE

## 2023-12-07 PROCEDURE — G8484 FLU IMMUNIZE NO ADMIN: HCPCS | Performed by: FAMILY MEDICINE

## 2023-12-07 RX ORDER — TRIAMCINOLONE ACETONIDE 1 MG/G
CREAM TOPICAL
Qty: 1 EACH | Refills: 0 | Status: SHIPPED | OUTPATIENT
Start: 2023-12-07

## 2023-12-07 RX ORDER — PREDNISONE 20 MG/1
20 TABLET ORAL 2 TIMES DAILY
Qty: 10 TABLET | Refills: 0 | Status: SHIPPED | OUTPATIENT
Start: 2023-12-07 | End: 2023-12-12

## 2023-12-07 ASSESSMENT — ENCOUNTER SYMPTOMS
SHORTNESS OF BREATH: 0
EYE DISCHARGE: 0
SORE THROAT: 0
RHINORRHEA: 0
ABDOMINAL PAIN: 0
COUGH: 0

## 2023-12-07 NOTE — PROGRESS NOTES
Attending Supervising Physician's Attestation Statement  I performed a history and physical examination on the patient and discussed the management with the resident physician. I reviewed and agree with the findings and plan as documented in his note . No jaundice or scleral icterus     Diagnosis Orders   1. Rash        2. Psoriasis  predniSONE (DELTASONE) 20 MG tablet    triamcinolone (KENALOG) 0.1 % cream      3. Itch          Itching possibly due to contact dermatitis versus eczema of her psoriasis. Treat with prednisone Kenalog cream.  Continue hydroxyzine. Recommend moisturizer creams.   low likelihood for hyperbilirubinemia as a cause    Electronically signed by Obi Cam MD on 12/7/23 at 11:04 AM EST
Provider   predniSONE (DELTASONE) 20 MG tablet Take 1 tablet by mouth 2 times daily for 5 days 12/7/23 12/12/23 Yes Victor Hugo Covington MD   triamcinolone (KENALOG) 0.1 % cream Apply topically 2 times daily. 12/7/23  Yes Victor Hugo Covington MD   tamsulosin (FLOMAX) 0.4 MG capsule TAKE 1 CAPSULE BY MOUTH EVERY DAY 11/27/23  Yes Aylin Bautista PA-C   chlorhexidine (PERIDEX) 0.12 % solution RINSE WITH 1/2 OUNCE BY MOUTH FOR 30 SECONDS THEN SPIT OUT. use twice a day 8/11/23  Yes Amador Vanessa MD   ibuprofen (ADVIL) 200 MG tablet Take 2 tablets by mouth every 8 hours as needed for Pain 4/29/23  Yes PRAMOD Sanders CNP   amLODIPine (NORVASC) 2.5 MG tablet Take 1 tablet by mouth daily 3/9/23  Yes Kayla Lott MD   atorvastatin (LIPITOR) 20 MG tablet Take 1 tablet by mouth nightly 3/9/23  Yes Kayla Lott MD   hydrOXYzine HCl (ATARAX) 10 MG tablet Take by mouth daily as needed 2/22/22  Yes Amador Vanessa MD   Melatonin 10 MG TABS Take by mouth daily as needed   Yes Amador Vanessa MD   zinc sulfate (ZINCATE) 220 (50 Zn) MG capsule Take 1 capsule by mouth daily 11/24/20  Yes Janny Julien MD   vitamin C (VITAMIN C) 1000 MG tablet Take 1 tablet by mouth daily 11/24/20  Yes Janny Julien MD   Vitamin D (CHOLECALCIFEROL) 50 MCG (2000 UT) TABS tablet Take 1 tablet by mouth daily 11/24/20  Yes Janny Julien MD   ketoconazole (NIZORAL) 2 % shampoo Apply topically daily as needed for Itching Apply topically daily as needed.    Yes Amador Vanessa MD   Multiple Vitamins-Minerals (MULTIVITAMIN-MINERALS) TABS tablet Take 1 tablet by mouth daily   Yes Amador Vanessa MD   aspirin EC 81 MG EC tablet Take 1 tablet by mouth daily  Patient not taking: Reported on 12/7/2023    Amador Vanessa MD        Allergies    Crestor [rosuvastatin calcium] and Benadryl [diphenhydramine]    Social    Social History     Tobacco Use    Smoking status: Never    Smokeless tobacco: Never    Tobacco

## 2024-02-08 ENCOUNTER — OFFICE VISIT (OUTPATIENT)
Dept: CARDIOLOGY CLINIC | Age: 74
End: 2024-02-08
Payer: MEDICARE

## 2024-02-08 VITALS
WEIGHT: 184 LBS | HEART RATE: 66 BPM | HEIGHT: 70 IN | BODY MASS INDEX: 26.34 KG/M2 | DIASTOLIC BLOOD PRESSURE: 80 MMHG | SYSTOLIC BLOOD PRESSURE: 156 MMHG

## 2024-02-08 DIAGNOSIS — E78.01 FAMILIAL HYPERCHOLESTEROLEMIA: ICD-10-CM

## 2024-02-08 DIAGNOSIS — I10 PRIMARY HYPERTENSION: Primary | ICD-10-CM

## 2024-02-08 PROCEDURE — G8417 CALC BMI ABV UP PARAM F/U: HCPCS | Performed by: NUCLEAR MEDICINE

## 2024-02-08 PROCEDURE — 1123F ACP DISCUSS/DSCN MKR DOCD: CPT | Performed by: NUCLEAR MEDICINE

## 2024-02-08 PROCEDURE — 3017F COLORECTAL CA SCREEN DOC REV: CPT | Performed by: NUCLEAR MEDICINE

## 2024-02-08 PROCEDURE — 3077F SYST BP >= 140 MM HG: CPT | Performed by: NUCLEAR MEDICINE

## 2024-02-08 PROCEDURE — 3079F DIAST BP 80-89 MM HG: CPT | Performed by: NUCLEAR MEDICINE

## 2024-02-08 PROCEDURE — G8484 FLU IMMUNIZE NO ADMIN: HCPCS | Performed by: NUCLEAR MEDICINE

## 2024-02-08 PROCEDURE — G8427 DOCREV CUR MEDS BY ELIG CLIN: HCPCS | Performed by: NUCLEAR MEDICINE

## 2024-02-08 PROCEDURE — 93000 ELECTROCARDIOGRAM COMPLETE: CPT | Performed by: NUCLEAR MEDICINE

## 2024-02-08 PROCEDURE — 1036F TOBACCO NON-USER: CPT | Performed by: NUCLEAR MEDICINE

## 2024-02-08 PROCEDURE — 99213 OFFICE O/P EST LOW 20 MIN: CPT | Performed by: NUCLEAR MEDICINE

## 2024-02-08 NOTE — PROGRESS NOTES
[facility] [place] [facility]  [facility] CARDIOLOG[facility]  [address]  [address]  [facility] [address]  Dept: [phone]  Dept Fax: [phone]  Loc: [phone]    Visit Date: 2/8/2024    Sami Green is a 73 y.o. male who presents todayfor:  Chief Complaint   Patient presents with   • Follow-up     1 year follow up    • Hypertension   • Shortness of Breath     With exertion   • Hyperlipidemia   Has risk for CAD  Some dyspnea on exertion   No chest pain   Some fatigue   No dizziness  No syncope  On statins for hyperlipidemia        HPI:  HPI  Past Medical History:   Diagnosis Date   • BPH (benign prostatic hyperplasia)    • COVID-19 11/12/2020   • Hepatitis B    • Hyperlipidemia    • Hypertension 06/08/2022   • Neuropathy 2021   • Psoriasis       Past Surgical History:   Procedure Laterality Date   • BACK SURGERY  2015    Herniated disk    • MOUTH SURGERY  1968     Family History   Problem Relation Age of Onset   • High Cholesterol Mother    • Emphysema Father    • Asthma Father    • Cancer Sister    • Cancer Brother      Social History     Tobacco Use   • Smoking status: Never   • Smokeless tobacco: Never   • Tobacco comments:     As a child my parents were heavy smokers   Substance Use Topics   • Alcohol use: Never      Current Outpatient Medications   Medication Sig Dispense Refill   • triamcinolone (KENALOG) 0.1 % cream Apply topically 2 times daily. 1 each 0   • tamsulosin (FLOMAX) 0.4 MG capsule TAKE 1 CAPSULE BY MOUTH EVERY DAY 90 capsule 3   • ibuprofen (ADVIL) 200 MG tablet Take 2 tablets by mouth every 8 hours as needed for Pain 120 tablet 3   • amLODIPine (NORVASC) 2.5 MG tablet Take 1 tablet by mouth daily 90 tablet 3   • atorvastatin (LIPITOR) 20 MG tablet Take 1 tablet by mouth nightly 90 tablet 3   • hydrOXYzine HCl (ATARAX) 10 MG tablet Take by mouth daily as needed     • Melatonin 10 MG TABS Take by mouth daily as needed     • zinc sulfate (ZINCATE) 220 (50 Zn) MG capsule

## 2024-03-11 ENCOUNTER — OFFICE VISIT (OUTPATIENT)
Dept: FAMILY MEDICINE CLINIC | Age: 74
End: 2024-03-11
Payer: MEDICARE

## 2024-03-11 VITALS
RESPIRATION RATE: 16 BRPM | TEMPERATURE: 97.9 F | HEART RATE: 62 BPM | BODY MASS INDEX: 26.23 KG/M2 | WEIGHT: 183.2 LBS | DIASTOLIC BLOOD PRESSURE: 70 MMHG | HEIGHT: 70 IN | SYSTOLIC BLOOD PRESSURE: 136 MMHG | OXYGEN SATURATION: 100 %

## 2024-03-11 DIAGNOSIS — E78.2 MIXED HYPERLIPIDEMIA: ICD-10-CM

## 2024-03-11 DIAGNOSIS — L29.9 ITCH: ICD-10-CM

## 2024-03-11 DIAGNOSIS — R73.09 ELEVATED GLUCOSE: ICD-10-CM

## 2024-03-11 DIAGNOSIS — I10 PRIMARY HYPERTENSION: Primary | ICD-10-CM

## 2024-03-11 DIAGNOSIS — R73.03 PRE-DIABETES: ICD-10-CM

## 2024-03-11 PROCEDURE — 3017F COLORECTAL CA SCREEN DOC REV: CPT | Performed by: FAMILY MEDICINE

## 2024-03-11 PROCEDURE — G8484 FLU IMMUNIZE NO ADMIN: HCPCS | Performed by: FAMILY MEDICINE

## 2024-03-11 PROCEDURE — 3075F SYST BP GE 130 - 139MM HG: CPT | Performed by: FAMILY MEDICINE

## 2024-03-11 PROCEDURE — 99214 OFFICE O/P EST MOD 30 MIN: CPT | Performed by: FAMILY MEDICINE

## 2024-03-11 PROCEDURE — 3078F DIAST BP <80 MM HG: CPT | Performed by: FAMILY MEDICINE

## 2024-03-11 PROCEDURE — G8417 CALC BMI ABV UP PARAM F/U: HCPCS | Performed by: FAMILY MEDICINE

## 2024-03-11 PROCEDURE — 1123F ACP DISCUSS/DSCN MKR DOCD: CPT | Performed by: FAMILY MEDICINE

## 2024-03-11 PROCEDURE — 1036F TOBACCO NON-USER: CPT | Performed by: FAMILY MEDICINE

## 2024-03-11 PROCEDURE — G8427 DOCREV CUR MEDS BY ELIG CLIN: HCPCS | Performed by: FAMILY MEDICINE

## 2024-03-11 RX ORDER — AMLODIPINE BESYLATE 2.5 MG/1
2.5 TABLET ORAL DAILY
Qty: 90 TABLET | Refills: 3 | Status: SHIPPED | OUTPATIENT
Start: 2024-03-11

## 2024-03-11 RX ORDER — ATORVASTATIN CALCIUM 20 MG/1
20 TABLET, FILM COATED ORAL NIGHTLY
Qty: 90 TABLET | Refills: 3 | Status: SHIPPED | OUTPATIENT
Start: 2024-03-11

## 2024-03-11 SDOH — ECONOMIC STABILITY: INCOME INSECURITY: HOW HARD IS IT FOR YOU TO PAY FOR THE VERY BASICS LIKE FOOD, HOUSING, MEDICAL CARE, AND HEATING?: NOT HARD AT ALL

## 2024-03-11 SDOH — ECONOMIC STABILITY: FOOD INSECURITY: WITHIN THE PAST 12 MONTHS, THE FOOD YOU BOUGHT JUST DIDN'T LAST AND YOU DIDN'T HAVE MONEY TO GET MORE.: NEVER TRUE

## 2024-03-11 SDOH — ECONOMIC STABILITY: FOOD INSECURITY: WITHIN THE PAST 12 MONTHS, YOU WORRIED THAT YOUR FOOD WOULD RUN OUT BEFORE YOU GOT MONEY TO BUY MORE.: NEVER TRUE

## 2024-03-11 ASSESSMENT — ENCOUNTER SYMPTOMS: SHORTNESS OF BREATH: 1

## 2024-03-11 NOTE — PROGRESS NOTES
[facility]  [address]  [place] [place] [address]  Dept: [phone]  Dept Fax: [phone]  Loc: [phone]  PROGRESS NOTE      Visit Date: 3/11/2024    Sami Green is a 73 y.o. male who presents today for:  Chief Complaint   Patient presents with    Hypertension     6 month f/u.  Pt denies any concerns today.      Skin Problem     Pt stated his skin is still itching at odd hours of the day. Not sure if its from the shingles vaccine. Mostly from the waist up and his arms.        Subjective:  Hypertension  Associated symptoms include shortness of breath (with exertion). Pertinent negatives include no chest pain.     6 month f/u    HTN: On Norvasc.     Hyperlipidemia:  LDL 98 in July. On lipitor 20 mg.  Seen by cardiology recently.  Some SOB with exertion.     Prediabetes: A1c of 6.2% in July.    Itching on trunk and arms.  On head.  No rash. Has not taken atarax. Intermittent.  4 flare ups in past 3 months.  Kenalog cream helps some.     Cares for wife at home.    Review of Systems   Respiratory:  Positive for shortness of breath (with exertion).    Cardiovascular:  Negative for chest pain.   Neurological:  Negative for dizziness, weakness and light-headedness.     Patient Active Problem List   Diagnosis    Benign prostatic hyperplasia with nocturia    Mixed hyperlipidemia    Psoriasis    Pre-diabetes     Past Medical History:   Diagnosis Date    BPH (benign prostatic hyperplasia)     COVID-19 11/12/2020    Hepatitis B     Hyperlipidemia     Hypertension 06/08/2022    Neuropathy 2021    Psoriasis       Past Surgical History:   Procedure Laterality Date    BACK SURGERY  2015    Herniated disk     MOUTH SURGERY  1968     Family History   Problem Relation Age of Onset    High Cholesterol Mother     Emphysema Father     Asthma Father     Cancer Sister     Cancer Brother      Social History     Tobacco Use    Smoking status: Never    Smokeless

## 2024-04-18 ENCOUNTER — HOSPITAL ENCOUNTER (INPATIENT)
Age: 74
LOS: 1 days | Discharge: HOME | End: 2024-04-19
Attending: EMERGENCY MEDICINE
Payer: MEDICARE

## 2024-04-18 ENCOUNTER — APPOINTMENT (OUTPATIENT)
Dept: GENERAL RADIOLOGY | Age: 74
End: 2024-04-18
Payer: MEDICARE

## 2024-04-18 ENCOUNTER — APPOINTMENT (OUTPATIENT)
Dept: CT IMAGING | Age: 74
End: 2024-04-18
Payer: MEDICARE

## 2024-04-18 DIAGNOSIS — R11.2 NAUSEA AND VOMITING, UNSPECIFIED VOMITING TYPE: ICD-10-CM

## 2024-04-18 DIAGNOSIS — I49.8 ACCELERATED JUNCTIONAL RHYTHM: ICD-10-CM

## 2024-04-18 DIAGNOSIS — R42 DIZZINESS: Primary | ICD-10-CM

## 2024-04-18 LAB
ANION GAP SERPL CALC-SCNC: 14 MEQ/L (ref 8–16)
BASOPHILS ABSOLUTE: 0 THOU/MM3 (ref 0–0.1)
BASOPHILS NFR BLD AUTO: 0.5 %
BILIRUB UR QL STRIP.AUTO: NEGATIVE
BUN SERPL-MCNC: 24 MG/DL (ref 7–22)
CALCIUM SERPL-MCNC: 9.5 MG/DL (ref 8.5–10.5)
CHARACTER UR: CLEAR
CHLORIDE SERPL-SCNC: 100 MEQ/L (ref 98–111)
CO2 SERPL-SCNC: 25 MEQ/L (ref 23–33)
COLOR: YELLOW
CREAT SERPL-MCNC: 1.2 MG/DL (ref 0.4–1.2)
DEPRECATED RDW RBC AUTO: 39.3 FL (ref 35–45)
EOSINOPHIL NFR BLD AUTO: 1.4 %
EOSINOPHILS ABSOLUTE: 0.1 THOU/MM3 (ref 0–0.4)
ERYTHROCYTE [DISTWIDTH] IN BLOOD BY AUTOMATED COUNT: 12.2 % (ref 11.5–14.5)
FLUAV RNA RESP QL NAA+PROBE: NOT DETECTED
FLUBV RNA RESP QL NAA+PROBE: NOT DETECTED
GFR SERPL CREATININE-BSD FRML MDRD: 63 ML/MIN/1.73M2
GLUCOSE SERPL-MCNC: 151 MG/DL (ref 70–108)
GLUCOSE UR QL STRIP.AUTO: NEGATIVE MG/DL
HCT VFR BLD AUTO: 41 % (ref 42–52)
HGB BLD-MCNC: 14 GM/DL (ref 14–18)
HGB UR QL STRIP.AUTO: NEGATIVE
IMM GRANULOCYTES # BLD AUTO: 0.02 THOU/MM3 (ref 0–0.07)
IMM GRANULOCYTES NFR BLD AUTO: 0.3 %
KETONES UR QL STRIP.AUTO: NEGATIVE
LYMPHOCYTES ABSOLUTE: 0.8 THOU/MM3 (ref 1–4.8)
LYMPHOCYTES NFR BLD AUTO: 12.3 %
MCH RBC QN AUTO: 30.4 PG (ref 26–33)
MCHC RBC AUTO-ENTMCNC: 34.1 GM/DL (ref 32.2–35.5)
MCV RBC AUTO: 88.9 FL (ref 80–94)
MONOCYTES ABSOLUTE: 0.3 THOU/MM3 (ref 0.4–1.3)
MONOCYTES NFR BLD AUTO: 4.5 %
NEUTROPHILS NFR BLD AUTO: 81 %
NITRITE UR QL STRIP: NEGATIVE
NRBC BLD AUTO-RTO: 0 /100 WBC
OSMOLALITY SERPL CALC.SUM OF ELEC: 284.5 MOSMOL/KG (ref 275–300)
PH UR STRIP.AUTO: 8 [PH] (ref 5–9)
PLATELET # BLD AUTO: 132 THOU/MM3 (ref 130–400)
PMV BLD AUTO: 11.9 FL (ref 9.4–12.4)
POTASSIUM SERPL-SCNC: 4.2 MEQ/L (ref 3.5–5.2)
PROT UR STRIP.AUTO-MCNC: NEGATIVE MG/DL
RBC # BLD AUTO: 4.61 MILL/MM3 (ref 4.7–6.1)
SARS-COV-2 RNA RESP QL NAA+PROBE: NOT DETECTED
SEGMENTED NEUTROPHILS ABSOLUTE COUNT: 5.4 THOU/MM3 (ref 1.8–7.7)
SODIUM SERPL-SCNC: 139 MEQ/L (ref 135–145)
SP GR UR REFRACT.AUTO: 1.02 (ref 1–1.03)
TROPONIN, HIGH SENSITIVITY: 8 NG/L (ref 0–12)
UROBILINOGEN, URINE: 0.2 EU/DL (ref 0–1)
WBC # BLD AUTO: 6.7 THOU/MM3 (ref 4.8–10.8)
WBC #/AREA URNS HPF: NEGATIVE /[HPF]

## 2024-04-18 PROCEDURE — 99215 OFFICE O/P EST HI 40 MIN: CPT | Performed by: NURSE PRACTITIONER

## 2024-04-18 PROCEDURE — 2580000003 HC RX 258: Performed by: STUDENT IN AN ORGANIZED HEALTH CARE EDUCATION/TRAINING PROGRAM

## 2024-04-18 PROCEDURE — 6370000000 HC RX 637 (ALT 250 FOR IP): Performed by: STUDENT IN AN ORGANIZED HEALTH CARE EDUCATION/TRAINING PROGRAM

## 2024-04-18 PROCEDURE — 96361 HYDRATE IV INFUSION ADD-ON: CPT

## 2024-04-18 PROCEDURE — 71045 X-RAY EXAM CHEST 1 VIEW: CPT

## 2024-04-18 PROCEDURE — 99285 EMERGENCY DEPT VISIT HI MDM: CPT

## 2024-04-18 PROCEDURE — 6360000004 HC RX CONTRAST MEDICATION: Performed by: EMERGENCY MEDICINE

## 2024-04-18 PROCEDURE — 70496 CT ANGIOGRAPHY HEAD: CPT

## 2024-04-18 PROCEDURE — 99215 OFFICE O/P EST HI 40 MIN: CPT

## 2024-04-18 PROCEDURE — 99223 1ST HOSP IP/OBS HIGH 75: CPT | Performed by: PHYSICIAN ASSISTANT

## 2024-04-18 PROCEDURE — 70450 CT HEAD/BRAIN W/O DYE: CPT

## 2024-04-18 PROCEDURE — 81003 URINALYSIS AUTO W/O SCOPE: CPT

## 2024-04-18 PROCEDURE — 80048 BASIC METABOLIC PNL TOTAL CA: CPT

## 2024-04-18 PROCEDURE — 1200000003 HC TELEMETRY R&B

## 2024-04-18 PROCEDURE — 6370000000 HC RX 637 (ALT 250 FOR IP): Performed by: NURSE PRACTITIONER

## 2024-04-18 PROCEDURE — 85025 COMPLETE CBC W/AUTO DIFF WBC: CPT

## 2024-04-18 PROCEDURE — 70498 CT ANGIOGRAPHY NECK: CPT

## 2024-04-18 PROCEDURE — 84484 ASSAY OF TROPONIN QUANT: CPT

## 2024-04-18 PROCEDURE — 93005 ELECTROCARDIOGRAM TRACING: CPT | Performed by: EMERGENCY MEDICINE

## 2024-04-18 PROCEDURE — 87636 SARSCOV2 & INF A&B AMP PRB: CPT

## 2024-04-18 PROCEDURE — 93005 ELECTROCARDIOGRAM TRACING: CPT | Performed by: NURSE PRACTITIONER

## 2024-04-18 PROCEDURE — 36415 COLL VENOUS BLD VENIPUNCTURE: CPT

## 2024-04-18 PROCEDURE — 6360000002 HC RX W HCPCS: Performed by: STUDENT IN AN ORGANIZED HEALTH CARE EDUCATION/TRAINING PROGRAM

## 2024-04-18 PROCEDURE — 2580000003 HC RX 258: Performed by: NURSE PRACTITIONER

## 2024-04-18 PROCEDURE — 96374 THER/PROPH/DIAG INJ IV PUSH: CPT

## 2024-04-18 RX ORDER — ONDANSETRON 2 MG/ML
4 INJECTION INTRAMUSCULAR; INTRAVENOUS ONCE
Status: DISCONTINUED | OUTPATIENT
Start: 2024-04-18 | End: 2024-04-18

## 2024-04-18 RX ORDER — ONDANSETRON 2 MG/ML
4 INJECTION INTRAMUSCULAR; INTRAVENOUS ONCE
Status: COMPLETED | OUTPATIENT
Start: 2024-04-18 | End: 2024-04-18

## 2024-04-18 RX ORDER — MECLIZINE HCL 25MG 25 MG/1
25 TABLET, CHEWABLE ORAL ONCE
Status: COMPLETED | OUTPATIENT
Start: 2024-04-18 | End: 2024-04-18

## 2024-04-18 RX ORDER — SODIUM CHLORIDE 9 MG/ML
INJECTION, SOLUTION INTRAVENOUS CONTINUOUS
Status: DISCONTINUED | OUTPATIENT
Start: 2024-04-18 | End: 2024-04-18

## 2024-04-18 RX ORDER — 0.9 % SODIUM CHLORIDE 0.9 %
500 INTRAVENOUS SOLUTION INTRAVENOUS ONCE
Status: COMPLETED | OUTPATIENT
Start: 2024-04-18 | End: 2024-04-18

## 2024-04-18 RX ORDER — ONDANSETRON 4 MG/1
4 TABLET, ORALLY DISINTEGRATING ORAL ONCE
Status: COMPLETED | OUTPATIENT
Start: 2024-04-18 | End: 2024-04-18

## 2024-04-18 RX ADMIN — ONDANSETRON 4 MG: 2 INJECTION INTRAMUSCULAR; INTRAVENOUS at 20:46

## 2024-04-18 RX ADMIN — MECLIZINE HYDROCHLORIDE 25 MG: 25 TABLET, CHEWABLE ORAL at 22:42

## 2024-04-18 RX ADMIN — MECLIZINE HYDROCHLORIDE 25 MG: 25 TABLET, CHEWABLE ORAL at 20:46

## 2024-04-18 RX ADMIN — ONDANSETRON 4 MG: 2 INJECTION INTRAMUSCULAR; INTRAVENOUS at 22:42

## 2024-04-18 RX ADMIN — IOPAMIDOL 80 ML: 755 INJECTION, SOLUTION INTRAVENOUS at 21:31

## 2024-04-18 RX ADMIN — SODIUM CHLORIDE: 9 INJECTION, SOLUTION INTRAVENOUS at 19:33

## 2024-04-18 RX ADMIN — SODIUM CHLORIDE 500 ML: 9 INJECTION, SOLUTION INTRAVENOUS at 20:46

## 2024-04-18 RX ADMIN — ONDANSETRON 4 MG: 4 TABLET, ORALLY DISINTEGRATING ORAL at 18:37

## 2024-04-18 ASSESSMENT — ENCOUNTER SYMPTOMS
DIARRHEA: 0
ABDOMINAL DISTENTION: 0
VOMITING: 1
SHORTNESS OF BREATH: 1
NAUSEA: 1
ABDOMINAL PAIN: 0
CHEST TIGHTNESS: 0

## 2024-04-18 ASSESSMENT — PAIN - FUNCTIONAL ASSESSMENT: PAIN_FUNCTIONAL_ASSESSMENT: NONE - DENIES PAIN

## 2024-04-18 NOTE — ED TRIAGE NOTES
Patient to room from registration. C/o sudden onset of dizziness, nausea, and vomiting beginning one hour ago. EKG completed. Provider notified.    Communicate Risk of Fall with Harm to all staff/Reinforce activity limits and safety measures with patient and family/Tailored Fall Risk Interventions/Visual Cue: Yellow wristband and red socks/Bed in lowest position, wheels locked, appropriate side rails in place/Call bell, personal items and telephone in reach/Instruct patient to call for assistance before getting out of bed or chair/Non-slip footwear when patient is out of bed/[place] to call system/Physically safe environment - no spills, clutter or unnecessary equipment/Purposeful Proactive Rounding/Room/bathroom lighting operational, light cord in reach

## 2024-04-18 NOTE — PROGRESS NOTES
Patient release to EMS in stable condition. Unsuccessful attempts x 2 to place peripheral IV. Patient's belongings released to daughter.

## 2024-04-18 NOTE — ED PROVIDER NOTES
[facility] EMERGENCY DEPT  Urgent Care Encounter       CHIEF COMPLAINT       Chief Complaint   Patient presents with    Dizziness     Nausea & vomiting       Nurses Notes reviewed and I agree except as noted in the HPI.  HISTORY OF PRESENT ILLNESS   Gallardo JOANNA Green is a 73 y.o. male who presents with new onset dizziness, nausea, and vomiting.  Started approximately an hour prior to arrival.  Patient reports getting up from the couch and becoming dizzy.  He sat back down and the dizziness resided.  However, he began to vomit shortly thereafter.  He has vomited multiple times in the past hour.  Denies any chest pain.  However, admits to some shortness of breath when vomiting.  Dizziness continues.  He is accompanied by his daughter.    The history is provided by the patient.       REVIEW OF SYSTEMS     Review of Systems   Constitutional:  Negative for fatigue and fever.   Respiratory:  Positive for shortness of breath. Negative for chest tightness.    Cardiovascular:  Negative for chest pain.   Gastrointestinal:  Positive for nausea and vomiting. Negative for abdominal distention, abdominal pain and diarrhea.   Musculoskeletal:  Negative for myalgias.   Neurological:  Positive for dizziness. Negative for headaches.   Psychiatric/Behavioral:  Negative for confusion.        PAST MEDICAL HISTORY         Diagnosis Date    BPH (benign prostatic hyperplasia)     COVID-19 11/12/2020    Hepatitis B     Hyperlipidemia     Hypertension 06/08/2022    Neuropathy 2021    Psoriasis        SURGICALHISTORY     Patient  has a past surgical history that includes back surgery (2015) and Mouth surgery (1968).    CURRENT MEDICATIONS       Previous Medications    AMLODIPINE (NORVASC) 2.5 MG TABLET    Take 1 tablet by mouth daily    ATORVASTATIN (LIPITOR) 20 MG TABLET    Take 1 tablet by mouth nightly    IBUPROFEN (ADVIL) 200 MG TABLET    Take 2 tablets by mouth every 8 hours as needed for Pain    MELATONIN 10 MG TABS    Take by mouth daily

## 2024-04-19 ENCOUNTER — APPOINTMENT (OUTPATIENT)
Dept: MRI IMAGING | Age: 74
End: 2024-04-19
Payer: MEDICARE

## 2024-04-19 VITALS
HEART RATE: 57 BPM | TEMPERATURE: 97.9 F | BODY MASS INDEX: 26.8 KG/M2 | RESPIRATION RATE: 16 BRPM | WEIGHT: 187.17 LBS | SYSTOLIC BLOOD PRESSURE: 157 MMHG | OXYGEN SATURATION: 100 % | HEIGHT: 70 IN | DIASTOLIC BLOOD PRESSURE: 85 MMHG

## 2024-04-19 PROBLEM — H81.10 BENIGN PAROXYSMAL POSITIONAL VERTIGO: Status: ACTIVE | Noted: 2024-04-19

## 2024-04-19 LAB
ANION GAP SERPL CALC-SCNC: 12 MEQ/L (ref 8–16)
BASOPHILS ABSOLUTE: 0 THOU/MM3 (ref 0–0.1)
BASOPHILS NFR BLD AUTO: 0.5 %
BUN SERPL-MCNC: 19 MG/DL (ref 7–22)
CALCIUM SERPL-MCNC: 9.1 MG/DL (ref 8.5–10.5)
CHLORIDE SERPL-SCNC: 100 MEQ/L (ref 98–111)
CO2 SERPL-SCNC: 25 MEQ/L (ref 23–33)
CREAT SERPL-MCNC: 1.1 MG/DL (ref 0.4–1.2)
DEPRECATED RDW RBC AUTO: 39.7 FL (ref 35–45)
EKG ATRIAL RATE: 57 BPM
EKG ATRIAL RATE: 69 BPM
EKG P AXIS: 20 DEGREES
EKG P AXIS: 54 DEGREES
EKG P-R INTERVAL: 170 MS
EKG P-R INTERVAL: 172 MS
EKG Q-T INTERVAL: 422 MS
EKG Q-T INTERVAL: 444 MS
EKG Q-T INTERVAL: 558 MS
EKG QRS DURATION: 88 MS
EKG QRS DURATION: 90 MS
EKG QRS DURATION: 90 MS
EKG QTC CALCULATION (BAZETT): 432 MS
EKG QTC CALCULATION (BAZETT): 452 MS
EKG QTC CALCULATION (BAZETT): 597 MS
EKG R AXIS: 45 DEGREES
EKG R AXIS: 47 DEGREES
EKG T AXIS: 75 DEGREES
EKG T AXIS: 75 DEGREES
EKG T AXIS: 90 DEGREES
EKG VENTRICULAR RATE: 57 BPM
EKG VENTRICULAR RATE: 69 BPM
EKG VENTRICULAR RATE: 69 BPM
EOSINOPHIL NFR BLD AUTO: 0.2 %
EOSINOPHILS ABSOLUTE: 0 THOU/MM3 (ref 0–0.4)
ERYTHROCYTE [DISTWIDTH] IN BLOOD BY AUTOMATED COUNT: 12.1 % (ref 11.5–14.5)
GFR SERPL CREATININE-BSD FRML MDRD: 70 ML/MIN/1.73M2
GLUCOSE SERPL-MCNC: 133 MG/DL (ref 70–108)
HCT VFR BLD AUTO: 43.5 % (ref 42–52)
HGB BLD-MCNC: 14.7 GM/DL (ref 14–18)
IMM GRANULOCYTES # BLD AUTO: 0.04 THOU/MM3 (ref 0–0.07)
IMM GRANULOCYTES NFR BLD AUTO: 0.5 %
LYMPHOCYTES ABSOLUTE: 1 THOU/MM3 (ref 1–4.8)
LYMPHOCYTES NFR BLD AUTO: 11.7 %
MCH RBC QN AUTO: 30.4 PG (ref 26–33)
MCHC RBC AUTO-ENTMCNC: 33.8 GM/DL (ref 32.2–35.5)
MCV RBC AUTO: 90.1 FL (ref 80–94)
MONOCYTES ABSOLUTE: 0.4 THOU/MM3 (ref 0.4–1.3)
MONOCYTES NFR BLD AUTO: 5.1 %
NEUTROPHILS NFR BLD AUTO: 82 %
NRBC BLD AUTO-RTO: 0 /100 WBC
OSMOLALITY SERPL CALC.SUM OF ELEC: 278 MOSMOL/KG (ref 275–300)
PLATELET # BLD AUTO: 155 THOU/MM3 (ref 130–400)
PMV BLD AUTO: 11.9 FL (ref 9.4–12.4)
POTASSIUM SERPL-SCNC: 4.1 MEQ/L (ref 3.5–5.2)
RBC # BLD AUTO: 4.83 MILL/MM3 (ref 4.7–6.1)
SEGMENTED NEUTROPHILS ABSOLUTE COUNT: 7.1 THOU/MM3 (ref 1.8–7.7)
SODIUM SERPL-SCNC: 137 MEQ/L (ref 135–145)
WBC # BLD AUTO: 8.6 THOU/MM3 (ref 4.8–10.8)

## 2024-04-19 PROCEDURE — 85025 COMPLETE CBC W/AUTO DIFF WBC: CPT

## 2024-04-19 PROCEDURE — 6360000002 HC RX W HCPCS: Performed by: PHYSICIAN ASSISTANT

## 2024-04-19 PROCEDURE — 97530 THERAPEUTIC ACTIVITIES: CPT

## 2024-04-19 PROCEDURE — 97116 GAIT TRAINING THERAPY: CPT

## 2024-04-19 PROCEDURE — 93005 ELECTROCARDIOGRAM TRACING: CPT | Performed by: PHYSICIAN ASSISTANT

## 2024-04-19 PROCEDURE — 6370000000 HC RX 637 (ALT 250 FOR IP): Performed by: INTERNAL MEDICINE

## 2024-04-19 PROCEDURE — 93010 ELECTROCARDIOGRAM REPORT: CPT | Performed by: INTERNAL MEDICINE

## 2024-04-19 PROCEDURE — 36415 COLL VENOUS BLD VENIPUNCTURE: CPT

## 2024-04-19 PROCEDURE — 97163 PT EVAL HIGH COMPLEX 45 MIN: CPT

## 2024-04-19 PROCEDURE — 2580000003 HC RX 258: Performed by: PHYSICIAN ASSISTANT

## 2024-04-19 PROCEDURE — 99239 HOSP IP/OBS DSCHRG MGMT >30: CPT | Performed by: INTERNAL MEDICINE

## 2024-04-19 PROCEDURE — 70551 MRI BRAIN STEM W/O DYE: CPT

## 2024-04-19 PROCEDURE — 99223 1ST HOSP IP/OBS HIGH 75: CPT | Performed by: NURSE PRACTITIONER

## 2024-04-19 PROCEDURE — 80048 BASIC METABOLIC PNL TOTAL CA: CPT

## 2024-04-19 PROCEDURE — 6370000000 HC RX 637 (ALT 250 FOR IP): Performed by: PHYSICIAN ASSISTANT

## 2024-04-19 RX ORDER — SODIUM CHLORIDE 0.9 % (FLUSH) 0.9 %
5-40 SYRINGE (ML) INJECTION EVERY 12 HOURS SCHEDULED
Status: DISCONTINUED | OUTPATIENT
Start: 2024-04-19 | End: 2024-04-19 | Stop reason: HOSPADM

## 2024-04-19 RX ORDER — AMLODIPINE BESYLATE 2.5 MG/1
2.5 TABLET ORAL DAILY
Status: DISCONTINUED | OUTPATIENT
Start: 2024-04-19 | End: 2024-04-19 | Stop reason: HOSPADM

## 2024-04-19 RX ORDER — ACETAMINOPHEN 650 MG/1
650 SUPPOSITORY RECTAL EVERY 6 HOURS PRN
Status: DISCONTINUED | OUTPATIENT
Start: 2024-04-19 | End: 2024-04-19 | Stop reason: HOSPADM

## 2024-04-19 RX ORDER — LANOLIN ALCOHOL/MO/W.PET/CERES
10 CREAM (GRAM) TOPICAL NIGHTLY PRN
Status: DISCONTINUED | OUTPATIENT
Start: 2024-04-19 | End: 2024-04-19 | Stop reason: HOSPADM

## 2024-04-19 RX ORDER — POTASSIUM CHLORIDE 7.45 MG/ML
10 INJECTION INTRAVENOUS PRN
Status: DISCONTINUED | OUTPATIENT
Start: 2024-04-19 | End: 2024-04-19 | Stop reason: HOSPADM

## 2024-04-19 RX ORDER — CALCIUM CARBONATE 500 MG/1
500 TABLET, CHEWABLE ORAL 3 TIMES DAILY PRN
Status: DISCONTINUED | OUTPATIENT
Start: 2024-04-19 | End: 2024-04-19 | Stop reason: HOSPADM

## 2024-04-19 RX ORDER — MECLIZINE HCL 12.5 MG/1
12.5 TABLET ORAL 3 TIMES DAILY PRN
Qty: 15 TABLET | Refills: 0 | Status: SHIPPED | OUTPATIENT
Start: 2024-04-19

## 2024-04-19 RX ORDER — ENOXAPARIN SODIUM 100 MG/ML
40 INJECTION SUBCUTANEOUS DAILY
Status: DISCONTINUED | OUTPATIENT
Start: 2024-04-19 | End: 2024-04-19 | Stop reason: HOSPADM

## 2024-04-19 RX ORDER — MECLIZINE HCL 12.5 MG/1
12.5 TABLET ORAL EVERY 6 HOURS SCHEDULED
Status: DISCONTINUED | OUTPATIENT
Start: 2024-04-19 | End: 2024-04-19 | Stop reason: HOSPADM

## 2024-04-19 RX ORDER — ONDANSETRON 2 MG/ML
4 INJECTION INTRAMUSCULAR; INTRAVENOUS EVERY 6 HOURS PRN
Status: DISCONTINUED | OUTPATIENT
Start: 2024-04-19 | End: 2024-04-19 | Stop reason: HOSPADM

## 2024-04-19 RX ORDER — ATORVASTATIN CALCIUM 20 MG/1
20 TABLET, FILM COATED ORAL NIGHTLY
Status: DISCONTINUED | OUTPATIENT
Start: 2024-04-19 | End: 2024-04-19 | Stop reason: HOSPADM

## 2024-04-19 RX ORDER — POTASSIUM CHLORIDE 20 MEQ/1
40 TABLET, EXTENDED RELEASE ORAL PRN
Status: DISCONTINUED | OUTPATIENT
Start: 2024-04-19 | End: 2024-04-19 | Stop reason: HOSPADM

## 2024-04-19 RX ORDER — ACETAMINOPHEN 325 MG/1
650 TABLET ORAL EVERY 6 HOURS PRN
Status: DISCONTINUED | OUTPATIENT
Start: 2024-04-19 | End: 2024-04-19 | Stop reason: HOSPADM

## 2024-04-19 RX ORDER — TAMSULOSIN HYDROCHLORIDE 0.4 MG/1
0.4 CAPSULE ORAL DAILY
Status: DISCONTINUED | OUTPATIENT
Start: 2024-04-19 | End: 2024-04-19 | Stop reason: HOSPADM

## 2024-04-19 RX ORDER — POLYETHYLENE GLYCOL 3350 17 G/17G
17 POWDER, FOR SOLUTION ORAL DAILY PRN
Status: DISCONTINUED | OUTPATIENT
Start: 2024-04-19 | End: 2024-04-19 | Stop reason: HOSPADM

## 2024-04-19 RX ORDER — MAGNESIUM SULFATE IN WATER 40 MG/ML
2000 INJECTION, SOLUTION INTRAVENOUS PRN
Status: DISCONTINUED | OUTPATIENT
Start: 2024-04-19 | End: 2024-04-19 | Stop reason: HOSPADM

## 2024-04-19 RX ORDER — ONDANSETRON 4 MG/1
4 TABLET, ORALLY DISINTEGRATING ORAL EVERY 8 HOURS PRN
Status: DISCONTINUED | OUTPATIENT
Start: 2024-04-19 | End: 2024-04-19 | Stop reason: HOSPADM

## 2024-04-19 RX ORDER — SODIUM CHLORIDE 0.9 % (FLUSH) 0.9 %
5-40 SYRINGE (ML) INJECTION PRN
Status: DISCONTINUED | OUTPATIENT
Start: 2024-04-19 | End: 2024-04-19 | Stop reason: HOSPADM

## 2024-04-19 RX ORDER — SODIUM CHLORIDE 9 MG/ML
INJECTION, SOLUTION INTRAVENOUS PRN
Status: DISCONTINUED | OUTPATIENT
Start: 2024-04-19 | End: 2024-04-19 | Stop reason: HOSPADM

## 2024-04-19 RX ADMIN — ENOXAPARIN SODIUM 40 MG: 100 INJECTION SUBCUTANEOUS at 08:47

## 2024-04-19 RX ADMIN — ANTACID TABLETS 500 MG: 500 TABLET, CHEWABLE ORAL at 05:23

## 2024-04-19 RX ADMIN — SODIUM CHLORIDE, PRESERVATIVE FREE 10 ML: 5 INJECTION INTRAVENOUS at 08:47

## 2024-04-19 RX ADMIN — MECLIZINE 12.5 MG: 12.5 TABLET ORAL at 12:19

## 2024-04-19 RX ADMIN — TAMSULOSIN HYDROCHLORIDE 0.4 MG: 0.4 CAPSULE ORAL at 08:46

## 2024-04-19 RX ADMIN — ATORVASTATIN CALCIUM 20 MG: 20 TABLET, FILM COATED ORAL at 04:35

## 2024-04-19 RX ADMIN — ONDANSETRON 4 MG: 2 INJECTION INTRAMUSCULAR; INTRAVENOUS at 02:55

## 2024-04-19 RX ADMIN — AMLODIPINE BESYLATE 2.5 MG: 2.5 TABLET ORAL at 08:46

## 2024-04-19 RX ADMIN — MECLIZINE 12.5 MG: 12.5 TABLET ORAL at 05:59

## 2024-04-19 ASSESSMENT — ENCOUNTER SYMPTOMS
COLOR CHANGE: 0
VOMITING: 1
SHORTNESS OF BREATH: 0
EYE PAIN: 0
CHEST TIGHTNESS: 0
ABDOMINAL PAIN: 1
PHOTOPHOBIA: 0
SORE THROAT: 1
FACIAL SWELLING: 0
ALLERGIC/IMMUNOLOGIC NEGATIVE: 1
WHEEZING: 0
COUGH: 0
NAUSEA: 1

## 2024-04-19 NOTE — PLAN OF CARE
Problem: Discharge Planning  Goal: Discharge to home or other facility with appropriate resources  Outcome: Progressing  Flowsheets (Taken 4/19/2024 0324)  Discharge to home or other facility with appropriate resources:   Identify barriers to discharge with patient and caregiver   Arrange for needed discharge resources and transportation as appropriate     Problem: Skin/Tissue Integrity  Goal: Absence of new skin breakdown  Description: 1.  Monitor for areas of redness and/or skin breakdown  2.  Assess vascular access sites hourly  3.  Every 4-6 hours minimum:  Change oxygen saturation probe site  4.  Every 4-6 hours:  If on nasal continuous positive airway pressure, respiratory therapy assess nares and determine need for appliance change or resting period.  Outcome: Progressing  Note: No signs of skin breakdown.  Skin warm, dry, and intact.  Mucous membranes pink and moist.  Assistance with turns/ambulation provided PRN.  Will continue to monitor.       Problem: Safety - Adult  Goal: Free from fall injury  Outcome: Progressing  Flowsheets (Taken 4/19/2024 0324)  Free From Fall Injury: Instruct family/caregiver on patient safety     Problem: Pain  Goal: Verbalizes/displays adequate comfort level or baseline comfort level  Outcome: Progressing  Flowsheets (Taken 4/19/2024 0324)  Verbalizes/displays adequate comfort level or baseline comfort level:   Encourage patient to monitor pain and request assistance   Assess pain using appropriate pain scale     Problem: Neurosensory - Adult  Goal: Achieves stable or improved neurological status  Outcome: Progressing  Flowsheets (Taken 4/19/2024 0324)  Achieves stable or improved neurological status:   Assess for and report changes in neurological status   Initiate measures to prevent increased intracranial pressure     Problem: Hematologic - Adult  Goal: Maintains hematologic stability  Outcome: Progressing  Flowsheets (Taken 4/19/2024 0324)  Maintains hematologic stability:  Assess for signs and symptoms of bleeding or hemorrhage   Care plan reviewed with patient.  Patient verbalizes understanding of the plan of care and contributed to goal setting.

## 2024-04-19 NOTE — ED PROVIDER NOTES

## 2024-04-19 NOTE — ED NOTES
ED to inpatient nurses report      Chief Complaint:  Chief Complaint   Patient presents with    Dizziness     Nausea & vomiting     Present to ED from: home    MOA:     LOC: alert and orientated to name, place, date  Mobility: Independent  Oxygen Baseline: room air    Current needs required: room air     Code Status:   Prior    What abnormal results were found and what did you give/do to treat them?   Any procedures or intervention occur?     Mental Status:  Level of Consciousness: Alert (0)    Psych Assessment:        Vitals:  Patient Vitals for the past 24 hrs:   BP Temp Temp src Pulse Resp SpO2 Weight   04/18/24 2240 (!) 166/88 -- -- 62 17 97 % --   04/18/24 2210 (!) 168/82 -- -- 57 13 95 % --   04/18/24 2044 (!) 162/82 -- -- 53 14 94 % --   04/18/24 1835 (!) 192/95 97.1 °F (36.2 °C) Temporal 66 22 99 % 81.6 kg (180 lb)        LDAs:   Peripheral IV 04/18/24 Distal;Left Cephalic (Active)   Site Assessment Clean, dry & intact 04/18/24 2240   Line Status Normal saline locked 04/18/24 2240   Line Care Connections checked and tightened 04/18/24 2240   Phlebitis Assessment No symptoms 04/18/24 2240   Infiltration Assessment 0 04/18/24 2240   Dressing Status Clean, dry & intact 04/18/24 2240   Dressing Type Transparent 04/18/24 2240       Ambulatory Status:  No data recorded    Diagnosis:  DISPOSITION Admitted 04/18/2024 10:27:06 PM   Final diagnoses:   Dizziness   Accelerated junctional rhythm   Nausea and vomiting, unspecified vomiting type        Consults:  None     Pain Score:  Pain Assessment  Pain Assessment: None - Denies Pain    C-SSRS:   Risk of Suicide: No Risk    Sepsis Screening:  Sepsis Risk Score: 1.41    Tiro Fall Risk:       Swallow Screening        Preferred Language:   English      ALLERGIES     Crestor [rosuvastatin calcium] and Benadryl [diphenhydramine]    SURGICAL HISTORY       Past Surgical History:   Procedure Laterality Date    BACK SURGERY  2015    Herniated disk     MOUTH SURGERY  1968        PAST MEDICAL HISTORY       Past Medical History:   Diagnosis Date    BPH (benign prostatic hyperplasia)     COVID-19 11/12/2020    Hepatitis B     Hyperlipidemia     Hypertension 06/08/2022    Neuropathy 2021    Psoriasis            Electronically signed by Yanira Claudio RN on 4/18/2024 at 10:43 PM

## 2024-04-19 NOTE — DISCHARGE SUMMARY
Resident Discharge Summary (Hospitalist)      Patient Identification:   Sami Green   : 1950  MRN: [id number]   Account: [id number]   Patient's PCP: Neri Covington MD    Admit Date: 2024   Discharge Date:   2024    Admitting Physician: No admitting provider for patient encounter.  Discharge Physician: Guy Ellison MD       Discharge Diagnoses:  Dizziness: Resolved.  Likely secondary to BPPV.  Patient reported remote history of vertigo roughly 10 years ago.  CTA head was negative.  CTA head/neck was negative.  MRI brain was negative.  Patient's dizziness was corrected by Epley maneuver on .  Neurology was consulted but did not think any further management or evaluation was necessary inpatient at this time.  Start meclizine 3 times daily as needed  Patient given information to follow-up for vestibular therapy as needed outpatient  Hypertension: Home medication: Amlodipine  Resume home medication  Hyperlipidemia: Home medication: Lipitor  Resume home medication  Prediabetes: 2023 Hemoglobin A1c 6.2.  Follow-up with PCP outpatient  Psoriasis: Noted  BPH: Patient follows with urology outpatient.  Home medication: Flomax   Resume home medication  Accelerated junctional rhythm: Ruled out on review of EKG      Hospital Course:   Sami Green is a 73 y.o. male with PMHx hepatitis B, HLD, HTN, neuropathy, psoriasis who was admitted to [facility] on 2024 for dizziness/vomiting . Patient noted the dizziness started on  when attempting to get up from his chair at roughly 5:30 PM.  He reports that he felt as if the room was spinning but denied any tinnitus or vision changes.  Patient denies any recent travel.  He noted that his wife has been \"fighting a cold\".  Patient stated that the dizziness persisted until coming into the hospital.  Patient has remote history of 2 episodes similar but to less severity in the past.  Prior workup did not show anything  Jorge Rock MD on    04/18/2024 10:06 PM      All CTs at this facility use dose modulation techniques and iterative    reconstructions, and/or weight-based dosing   when appropriate to reduce radiation to a low as reasonably achievable.      3D Post-processing was performed on this study.      CTA NECK W WO CONTRAST   Final Result   No acute findings in the arteries of the neck.      This document has been electronically signed by: Jorge Rock MD on    04/18/2024 10:06 PM      All CTs at this facility use dose modulation techniques and iterative    reconstructions, and/or weight-based dosing   when appropriate to reduce radiation to a low as reasonably achievable.      Carotid stenosis and measurements are in accordance with NASCET criteria.      3D Post-processing was performed on this study.      XR CHEST PORTABLE   Final Result   Mild left lower lobe atelectasis.      This document has been electronically signed by: Jorge Rock MD on    04/18/2024 08:48 PM             Consults:   IP CONSULT TO NEUROLOGY    Disposition: Home  Condition at Discharge: Stable    Code Status:  Full Code     Patient Instructions:    Discharge lab work: None  Activity: activity as tolerated  Diet: ADULT DIET; Regular; 4 carb choices (60 gm/meal); Low Fat/Low Chol/High Fiber/JESSICA      Follow-up visits:   Neri Covington MD  [address] [place] [address]  [phone]    Follow up in 1 week(s)           Discharge Medications:      Medication List        START taking these medications      meclizine 12.5 MG tablet  Commonly known as: ANTIVERT  Take 1 tablet by mouth 3 times daily as needed for Dizziness            CONTINUE taking these medications      amLODIPine 2.5 MG tablet  Commonly known as: NORVASC  Take 1 tablet by mouth daily     ascorbic acid 1000 MG tablet  Commonly known as: VITAMIN C  Take 1 tablet by mouth daily     atorvastatin 20 MG tablet  Commonly known as: LIPITOR  Take 1 tablet by mouth nightly      ibuprofen 200 MG tablet  Commonly known as: Advil  Take 2 tablets by mouth every 8 hours as needed for Pain     Melatonin 10 MG Tabs     multivitamin-minerals Tabs tablet     tamsulosin 0.4 MG capsule  Commonly known as: FLOMAX  TAKE 1 CAPSULE BY MOUTH EVERY DAY     triamcinolone 0.1 % cream  Commonly known as: KENALOG  Apply topically 2 times daily.     vitamin D 50 MCG (2000 UT) Tabs tablet  Commonly known as: CHOLECALCIFEROL  Take 1 tablet by mouth daily     zinc sulfate 220 (50 Zn)  mg capsule - elemental zinc  Commonly known as: ZINCATE  Take 1 capsule by mouth daily               Where to Get Your Medications        These medications were sent to [id number] - [place], [place] - [address] - P [phone] - F [phone] [address] [place] [address]      Phone: [phone]   meclizine 12.5 MG tablet          Time Spent on discharge is 35 minutes in the examination, evaluation, counseling and review of medications and discharge plan.    Thank you Neri Covington MD for the opportunity to be involved in this patient's care.      Signed:    Electronically signed by Guy Ellison MD on 4/19/24 at 2:04 PM EDT     Case was discussed with Attending, Dr. Davenport    This report has been created using voice recognition software. It may contain minor errors which are inherent in voice recognition technology

## 2024-04-19 NOTE — ED NOTES
Pt in bed, eyes open, respirations even and unlabored. Vital signs reassessed, pt updated on POC. No needs voiced.

## 2024-04-19 NOTE — PROGRESS NOTES
Patient admitted to 4A Room 15 from ED  Complaint upon arrival to the room dizziness, N/V  Vital signs obtained. Assessment and data collection initiated. Oriented to room. Policies and procedures for 4a explained All questions answered with no further questions at this time. Fall prevention and safety brochure discussed with patient. 2 person skin check completed.

## 2024-04-19 NOTE — CARE COORDINATION
Case Management Assessment Initial Evaluation    Date/Time of Evaluation: 2024 7:58 AM  Assessment Completed by: Xiomara Saldaña RN    If patient is discharged prior to next notation, then this note serves as note for discharge by case management.    Patient Name: Sami Green                   YOB: 1950  Diagnosis: Dizziness [R42]  Accelerated junctional rhythm [I49.8]  Nausea and vomiting, unspecified vomiting type [R11.2]                   Date / Time: 2024  6:34 PM  Location: [address] [place] [address]     Patient Admission Status: Inpatient   Readmission Risk Low 0-14, Mod 15-19), High > 20: Readmission Risk Score: 11.4    Current PCP: Neri Covington MD    Additional Case Management Notes: Patient sent from urgent care to ED with dizziness, nausea and vomiting.    Admitted by hospitalist with c/s to neurology. PT/OT. PRN IV Zofran required today. Awaiting neurology encounter and MRI results. EKG shows accelerated junctional rhythm. Repeat EKG ordered. Hypertensive high of 192/95. Vestibular therapy done x 2 this admission.     Procedures: n/a    Imagin/18 CXR: Mild left lower lobe atelectasis.    MRI Brain done, results in process.     Patient Goals/Plan/Treatment Preferences: Mack plans to return home with family and strong support. PT does not recommend set up of OP therapy at this time, but patient understands he can call facility of choice as needed and work with PCP for orders if needed. Denies further needs.        24 1120   Service Assessment   Patient Orientation Alert and Oriented   Cognition Alert   History Provided By Patient   Primary Caregiver Self   Accompanied By/Relationship daughter and son in law   Support Systems Spouse/Significant Other;Children;Family Members   Patient's Healthcare Decision Maker is: Named in Scanned ACP Document   PCP Verified by CM Yes   Last Visit to PCP Within last 3 months   Prior Functional Level Independent in ADLs/IADLs   Current

## 2024-04-19 NOTE — ED NOTES
Pt in bed, eyes open, respirations even and unlabored. Vital signs reassessed. Urine sample collected at this time. No needs voiced.

## 2024-04-19 NOTE — PLAN OF CARE
Problem: Discharge Planning  Goal: Discharge to home or other facility with appropriate resources  4/19/2024 0956 by Deedee Nuñez RN  Outcome: Progressing  Discharge to home or other facility with appropriate resources:   Identify barriers to discharge with patient and caregiver   Arrange for needed discharge resources and transportation as appropriate     Problem: Skin/Tissue Integrity  Goal: Absence of new skin breakdown  Description: 1.  Monitor for areas of redness and/or skin breakdown  2.  Assess vascular access sites hourly  3.  Every 4-6 hours minimum:  Change oxygen saturation probe site  4.  Every 4-6 hours:  If on nasal continuous positive airway pressure, respiratory therapy assess nares and determine need for appliance change or resting period.  4/19/2024 0956 by Deedee Nuñez RN  Outcome: Progressing     Problem: Safety - Adult  Goal: Free from fall injury  4/19/2024 0956 by Deedee Nuñez RN  Outcome: Progressing  Free From Fall Injury: Instruct family/caregiver on patient safety     Problem: Pain  Goal: Verbalizes/displays adequate comfort level or baseline comfort level  4/19/2024 0956 by Deedee Nuñez RN  Outcome: Progressing  Verbalizes/displays adequate comfort level or baseline comfort level:   Encourage patient to monitor pain and request assistance   Assess pain using appropriate pain scale     Problem: Neurosensory - Adult  Goal: Achieves stable or improved neurological status  4/19/2024 0956 by Deedee Nuñez RN  Outcome: Progressing  Achieves stable or improved neurological status:   Assess for and report changes in neurological status   Initiate measures to prevent increased intracranial pressure    Problem: Hematologic - Adult  Goal: Maintains hematologic stability  4/19/2024 0956 by Deedee Nuñez RN  Outcome: Progressing  Maintains hematologic stability: Assess for signs and symptoms of bleeding or hemorrhage

## 2024-04-19 NOTE — PROGRESS NOTES
Patient/family has been educated on their personal risk factors of:  Hypertension    Hyperlipidemia        They have been given hand outs on the following medications:(  give handouts/attach to AVS)    statins(Specify)      Treatment for stroke includes:  Risk factor modifications  Following the medication regime prescribed by physician      Educated on BE FAST-Face-Arm-Speech-Time    A stroke is a brain attack.Stroke is a brain injury. It occurs when the brain's blood supply is interrupted. Blood carries oxygen and nutrients to the brain. Without oxygen and nutrients from blood, brain tissue starts to die rapidly. This can happen in less than 10 minutes.    A stroke occurs when blood flow to the brain is blocked (called ischemic stroke). This is caused by one of the following:   Sudden decreased blood flow   Damage to a blood vessel supplying blood to the brain can occur suddenly from either:   Injury   A clot that forms and breaks off from another part of the body (such as the heart or neck)   There are certain conditions which predispose people to form blood clots, such as:   Cancer   Pregnancy   Atrial fibrillation   Certain autoimmune diseases   Local blood clot   A build-up of fatty substances ( atherosclerotic plaque ) along the inner lining of the artery causes:   Narrowing of artery   Reduced elasticity   Local inflammation   Blood protein defects leading to increased clotting tendency   Decreased blood flow in the artery   Clot in an artery supplying the brain   Inflammatory conditions in the blood vessels (vasculitis)   A stroke may also occur if a blood vessel breaks and bleeds into or around the brain. This is called hemorrhagic stroke.      This condition needs to be monitored closely. Be sure to keep all appointments. Have exams and blood tests done as directed.     Call 911 If Any of the Following Occurs   It is important that you and those around you know the warning signs for stroke. CALL 911  immediately if you have any of the following which may suggest a new stroke:   Sudden weakness or numbness of face, arm, or leg, especially on one side of the body   Sudden confusion   Sudden trouble speaking or understanding   Sudden trouble seeing in one or both eyes   Sudden dizziness, trouble walking, loss of balance, or coordination   Sudden severe headache with no known cause   If you think you have an emergency, CALL 911       To help reduce your risk of stroke, take the following steps:   Eat a well-balanced diet. The DASH diet rich in fruits, vegetables and low-fat dairy foods, and low in saturated fat, total fat, and cholesterol may help keep your blood pressure in the healthy range.   Exercise regularly.   Maintain a healthy weight. (Your body mass index should be below 25.)   If you smoke, quit .   Drink alcohol in moderation. Moderate is two or fewer drinks per day for men and one or fewer drinks per day for women and older adults.  Control your diabetes    All patient/family questions were answered and teach back method was utilized.

## 2024-04-19 NOTE — PROGRESS NOTES
Discharge teaching and instructions for diagnosis of dizziness completed with patient using teachback method. AVS reviewed. Printed prescriptions given to patient. Patient voiced understanding regarding prescriptions, follow up appointments, and care of self at home. Discharged in a wheelchair to  home with support per family

## 2024-04-19 NOTE — ED NOTES
Pt medicated per MAR. Vital signs reassessed, pt in bed with eyes open. Respirations even and unlabored. No needs voiced.

## 2024-04-19 NOTE — DISCHARGE INSTRUCTIONS
Start meclizine 12.5 mg 3 times daily as needed for dizziness  Follow-up with vestibular therapy if needed after discharge

## 2024-04-19 NOTE — H&P
Hospitalist - History & Physical      Patient: Gallardo E North    Unit/Bed:4A-15/015-A  YOB: 1950  MRN: [id number]   Acct: [id number]   PCP: Neri Covington MD      Assessment and Plan:        Dizziness:   Pt reports a remote history of vertigo and crystals in his ears \"over 10 years ago\" - chart review shows labyrinthitis from 1996 and BPPV from 2020  Ondansetron and meclizine started in ER  CT and CTAs negative - MRI ordered  Continue ondansetron 4mg q4 and meclizine 25mg q6  Consult OT  Fall precautions  Accelerated junctional rhythm:   New onset  Repeat EKG in AM  Essential HTN:   Chronic, uncontrolled  192/95 on arrival  Continue amlodipine 2.5mg daily  HLD:   Chronic, controlled  Continue atorvastatin 20mg hs  Pre-diabetes:   Chronic  A1c 6.2% 7/31/23  Follow up outpatient  Rash:   Chronic, uncontrolled  Follow with dermatology outpatient   BPH with nocturia:  Chronic, controlled  Continue tamsulosin 0.4mg daily    CC:  dizziness and vomiting    HPI: Pt reports to the ED today with his daughter with complaints of dizziness and vomiting. Pt states the dizziness began after getting up from his chair to answer his phone around 5:30pm. Dizziness subsided when pt sat back down in his chair but came back when he tried to use the restroom. Pt states the room was spinning and he began vomiting at home. Pt then went to  where he was transported to the ED. Pt  states the room begins spinning again anytime he tries to stand, moves his head forward or backward, or lies down. Pt states he began with a HA after returning from CT scan. Pt endorses some associated ST, lower abdominal pain, and neck pain.  Pt is being admitted for further workup of progressive dizziness and nausea.     ROS: Review of Systems   Constitutional:  Negative for diaphoresis and fever.   HENT:  Positive for sore throat. Negative for drooling and facial swelling.    Eyes:  Positive for visual disturbance. Negative for photophobia  (3/11/2024)    PRAPARE - Transportation     Lack of Transportation (Medical): Not on file     Lack of Transportation (Non-Medical): No   Physical Activity: Insufficiently Active (9/7/2023)    Exercise Vital Sign     Days of Exercise per Week: 1 day     Minutes of Exercise per Session: 10 min   Stress: Not on file   Social Connections: Not on file   Intimate Partner Violence: Not on file   Housing Stability: Unknown (3/11/2024)    Housing Stability Vital Sign     Unable to Pay for Housing in the Last Year: Not on file     Number of Places Lived in the Last Year: Not on file     Unstable Housing in the Last Year: No     FHX:   Family History   Problem Relation Age of Onset    High Cholesterol Mother     Emphysema Father     Asthma Father     Cancer Sister     Cancer Brother      Allergies:   Allergies   Allergen Reactions    Crestor [Rosuvastatin Calcium]     Benadryl [Diphenhydramine] Other (See Comments)     Jitter, hyperactivity if takes more than one day      Medications:      No current facility-administered medications on file prior to encounter.     Current Outpatient Medications on File Prior to Encounter   Medication Sig Dispense Refill    amLODIPine (NORVASC) 2.5 MG tablet Take 1 tablet by mouth daily 90 tablet 3    atorvastatin (LIPITOR) 20 MG tablet Take 1 tablet by mouth nightly 90 tablet 3    triamcinolone (KENALOG) 0.1 % cream Apply topically 2 times daily. 1 each 0    tamsulosin (FLOMAX) 0.4 MG capsule TAKE 1 CAPSULE BY MOUTH EVERY DAY 90 capsule 3    ibuprofen (ADVIL) 200 MG tablet Take 2 tablets by mouth every 8 hours as needed for Pain 120 tablet 3    Melatonin 10 MG TABS Take by mouth daily as needed      zinc sulfate (ZINCATE) 220 (50 Zn) MG capsule Take 1 capsule by mouth daily 30 capsule 3    vitamin C (VITAMIN C) 1000 MG tablet Take 1 tablet by mouth daily 30 tablet 3    Vitamin D (CHOLECALCIFEROL) 50 MCG (2000 UT) TABS tablet Take 1 tablet by mouth daily 60 tablet     Multiple     Troponin, High Sensitivity 8 0 - 12 ng/L   Anion Gap    Collection Time: 04/18/24  8:00 PM   Result Value Ref Range    Anion Gap 14.0 8.0 - 16.0 meq/L   Glomerular Filtration Rate, Estimated    Collection Time: 04/18/24  8:00 PM   Result Value Ref Range    Est, Glom Filt Rate 63 >60 ml/min/1.73m2   Osmolality    Collection Time: 04/18/24  8:00 PM   Result Value Ref Range    Osmolality Calc 284.5 275.0 - 300.0 mOsmol/kg   COVID-19 & Influenza Combo    Collection Time: 04/18/24  8:56 PM    Specimen: Nasopharyngeal Swab   Result Value Ref Range    SARS-CoV-2 RNA, RT PCR NOT DETECTED NOT DETECTED    INFLUENZA A NOT DETECTED NOT DETECTED    INFLUENZA B NOT DETECTED NOT DETECTED   Urinalysis with Reflex to Culture    Collection Time: 04/18/24 10:00 PM    Specimen: Urine   Result Value Ref Range    Glucose, Ur NEGATIVE NEGATIVE mg/dl    Bilirubin Urine NEGATIVE NEGATIVE    Ketones, Urine NEGATIVE NEGATIVE    Specific Gravity, Urine 1.021 1.002 - 1.030    Blood, Urine NEGATIVE NEGATIVE    pH, UA 8.0 5.0 - 9.0    Protein, UA NEGATIVE NEGATIVE    Urobilinogen, Urine 0.2 0.0 - 1.0 eu/dl    Nitrite, Urine NEGATIVE NEGATIVE    Leukocyte Esterase, Urine NEGATIVE NEGATIVE    Color, UA YELLOW STRAW-YELLOW    Character, Urine CLEAR CLEAR-SL CLOUD   Basic Metabolic Panel w/ Reflex to MG    Collection Time: 04/19/24  3:57 AM   Result Value Ref Range    Sodium 137 135 - 145 meq/L    Potassium reflex Magnesium 4.1 3.5 - 5.2 meq/L    Chloride 100 98 - 111 meq/L    CO2 25 23 - 33 meq/L    Glucose 133 (H) 70 - 108 mg/dL    BUN 19 7 - 22 mg/dL    Creatinine 1.1 0.4 - 1.2 mg/dL    Calcium 9.1 8.5 - 10.5 mg/dL   CBC with Auto Differential    Collection Time: 04/19/24  3:57 AM   Result Value Ref Range    WBC 8.6 4.8 - 10.8 thou/mm3    RBC 4.83 4.70 - 6.10 mill/mm3    Hemoglobin 14.7 14.0 - 18.0 gm/dl    Hematocrit 43.5 42.0 - 52.0 %    MCV 90.1 80.0 - 94.0 fL    MCH 30.4 26.0 - 33.0 pg    MCHC 33.8 32.2 - 35.5 gm/dl    RDW-CV 12.1 11.5 -  are personally viewed and interpreted unless otherwise noted).   Charts and labs reviewed from previous visits  EKG: rhythm: accelerated junctional, rate=69 bpm, pr=no p waves ms, qrs=90 ms, zs=105 ms, axis=45 degrees        Electronically signed by  Jaz Hopper PA-C

## 2024-04-19 NOTE — ED NOTES
Pt in bed sleeping with no s/s of distress noted. Daughter remains at bedside. Call light in reach.

## 2024-04-19 NOTE — PROGRESS NOTES
[facility]  INPATIENT PHYSICAL THERAPY  EVALUATION  [facility] 4A - 4A-15/015-A    Time In: 1115  Time Out: 1155  Timed Code Treatment Minutes: 30 Minutes  Minutes: 40          Date: 2024  Patient Name: Sami Green,  Gender:  male        MRN: [id number]  : 1950  (73 y.o.)      Referring Practitioner: Georges Davenport MD, Debbie Perez DO  Diagnosis: Dizziness  Additional Pertinent Hx: Pt reports to the ED today with his daughter with complaints of dizziness and vomiting. Pt states the dizziness began after getting up from his chair to answer his phone around 5:30pm. Dizziness subsided when pt sat back down in his chair but came back when he tried to use the restroom. Pt states the room was spinning and he began vomiting at home. Pt then went to  where he was transported to the ED. Pt  states the room begins spinning again anytime he tries to stand, moves his head forward or backward, or lies down. Pt states he began with a HA after returning from CT scan. Pt endorses some associated ST, lower abdominal pain, and neck pain.  Pt is being admitted for further workup of progressive dizziness and nausea.     Restrictions/Precautions:  Restrictions/Precautions: Fall Risk, General Precautions    Subjective:  Chart Reviewed: Yes  Patient assessed for rehabilitation services?: Yes  Subjective: Pt resting in bed and reports needing to use toilet. Pt agrees to therapy when done in bathroom.    General:     Vision: Impaired  Vision Exceptions: Wears glasses at all times  Hearing: Within functional limits       Pain: denied      Vitals: Vitals not assessed per clinical judgement, see nursing flowsheet    Social/Functional History:    Lives With: Spouse, Family  Type of Home: House  Home Layout: One level  Home Access: Stairs to enter with rails  Entrance Stairs - Number of Steps: 3 steps with at least 1 rail  Home Equipment: Cane          Receives Help From: Family  ADL  Assistance: Independent  Homemaking Assistance: Independent  Ambulation Assistance: Independent  Transfer Assistance: Independent    Active : Yes     Additional Comments: Takes care of his wife that has dementia    OBJECTIVE:  Range of Motion:  Bilateral Lower Extremity: WNL    Strength:  Bilateral Lower Extremity: WFL    Balance:  Static Sitting Balance:  Independent  Dynamic Sitting Balance: Modified Independent  Static Standing Balance: Supervision  Dynamic Standing Balance: Supervision    Bed Mobility:  Supine to Sit: Supervision  Sit to Supine: Supervision     Transfers:  Sit to Stand: Supervision  Stand to Sit:Supervision    Ambulation:  Supervision, Stand By Assistance  Distance: 160 ft  Surface: Level Tile  Device:No Device  Gait Deviations:  Decreased Step Length Bilaterally and Decreased Gait Speed    Stairs:  Stand By Assistance  Number of Steps: 3  Height: 6\" step with One Handrail    Exercise:  Not tested    Vestibular Assessment:     History of Falls: No  Diagnostic Testing: No  VBI: negative    Neck ROM: within normal limits    Vestibular Screening Tool (VST) Yes (2) Sometimes (1) No (0)   Do you have a feeling that things are spinning or moving around? [] [x] []   2. Does bending over and/or looking up at the katrina make you feel dizzy?  [] [x] []   3. Does lying down and/or turning over in bed make you feel dizzy?  [] [] [x]   4. Does moving your head quickly from side to side make you feel dizzy?  [] [x] []   Total    3/8   >/= 4/8 indicative of vestibular dysfunction       Oculomotor Function Tests:  Smooth Pursuits: no nystagmus & smooth   End range nystagmus: not present    Saccades: Normal, <2 eye movements   Head Thrust Test: negative     POSITIONAL VERTIGO TESTING:  LOADED YEISON REYES PIKE (Posterior/Anterior Canals)  Right Ear:  Negative  Left Ear:   Negative    ROLL TEST (Horizontal Canals)  Right Ear:  Negative  Left Ear:   Negative    MANEUVER PERFORMED: None indicated      PATIENT  Patient  Education Provided: Role of Therapy, Plan of Care  Education Method: Demonstration, Verbal  Barriers to Learning: None  Education Outcome: Verbalized understanding, Demonstrated understanding       Equipment Recommendations:  Equipment Needed: No    Plan:  Current Treatment Recommendations: Strengthening, Balance training, Vestibular rehab, Home exercise program, Gait training, Stair training, Functional mobility training, Safety education & training, Patient/Caregiver education & training  General Plan:  (1 or 2x)    Goals:  Patient Goals : go home  Short Term Goals  Time Frame for Short Term Goals: at discharge  Short Term Goal 1: Pt to be Mod I for supine <> sit to get in/out of bed  Short Term Goal 2: Pt to be Mod I for sit <> stand to get up to ambulae  Short Term Goal 3: Pt to ambulate >200 ft with no AD with [place] for household and community distances  Long Term Goals  Time Frame for Long Term Goals : not set due to short ELOS    Following session, patient left in safe position with all fall risk precautions in place.    Sakshi Brito, MPT [id number]

## 2024-04-19 NOTE — CONSULTS
Neurology Consult Note    Date:4/19/2024       Room:-15/015-A  Patient Name:Sami Green     YOB: 1950     Age:73 y.o.    Requesting Physician: Georges Davenport MD     Reason for Consult:  Evaluate for Dizziness      Chief Complaint:   Chief Complaint   Patient presents with    Dizziness     Nausea & vomiting       Subjective     History Taken from H&P - 4/18/24    HPI: Pt reports to the ED today with his daughter with complaints of dizziness and vomiting. Pt states the dizziness began after getting up from his chair to answer his phone around 5:30pm. Dizziness subsided when pt sat back down in his chair but came back when he tried to use the restroom. Pt states the room was spinning and he began vomiting at home. Pt then went to  where he was transported to the ED. Pt  states the room begins spinning again anytime he tries to stand, moves his head forward or backward, or lies down. Pt states he began with a HA after returning from CT scan. Pt endorses some associated ST, lower abdominal pain, and neck pain. Pt is being admitted for further workup of progressive dizziness and nausea.     Additional Information from my assessment and interview - 4/19/24    Patient has hx of labyrinthitis in 1996 and BPPV in 2020. Patient received an Eply maneuver this am at 0530 per Dr. Davenport and now all symptoms have resolved at the time of my assessment- 1130.     Review of Systems   As per HPI  Medications   Scheduled Meds:    amLODIPine  2.5 mg Oral Daily    atorvastatin  20 mg Oral Nightly    tamsulosin  0.4 mg Oral Daily    sodium chloride flush  5-40 mL IntraVENous 2 times per day    enoxaparin  40 mg SubCUTAneous Daily    meclizine  12.5 mg Oral 4 times per day     Continuous Infusions:    sodium chloride       PRN Meds: melatonin, sodium chloride flush, sodium chloride, potassium chloride **OR** potassium alternative oral replacement **OR** potassium chloride, magnesium sulfate, ondansetron **OR**  stenosis. No dissection. RIGHT EXTERNAL CAROTID ARTERY: Unremarkable. No occlusion. RIGHT VERTEBRAL ARTERY: Unremarkable. No occlusion or significant stenosis. No dissection. LEFT COMMON CAROTID ARTERY: Unremarkable. No occlusion or significant stenosis. No dissection. LEFT INTERNAL CAROTID ARTERY: Unremarkable. Extracranial segment is patent with no occlusion or significant stenosis. No dissection. LEFT EXTERNAL CAROTID ARTERY: Unremarkable. No occlusion. LEFT VERTEBRAL ARTERY: Unremarkable. No occlusion or significant stenosis. No dissection. NECK: BONES/JOINTS: Osteopenia. Anterior fusion noted at C4-C5. Multilevel spondylosis with osteophytosis, uncovertebral hypertrophy, facet arthropathy and degenerative disc disease. No acute fracture. SOFT TISSUES: Unremarkable. LUNG APICES: Clear.     No acute findings in the arteries of the neck. This document has been electronically signed by: Jorge Rock MD on 04/18/2024 10:06 PM All CTs at this facility use dose modulation techniques and iterative reconstructions, and/or weight-based dosing when appropriate to reduce radiation to a low as reasonably achievable. Carotid stenosis and measurements are in accordance with NASCET criteria. 3D Post-processing was performed on this study.    CTA HEAD W WO CONTRAST    Result Date: 4/18/2024  EXAM: CT Angiography Head With Intravenous Contrast MIPS post processing and 3D reconstruction was performed. COMPARISON: No relevant prior studies available. FINDINGS: RIGHT INTERNAL CAROTID ARTERY: No acute findings. Intracranial segment is patent with no significant stenosis. No aneurysm. RIGHT ANTERIOR CEREBRAL ARTERY: Unremarkable. No occlusion or significant stenosis. No aneurysm. RIGHT MIDDLE CEREBRAL ARTERY: Unremarkable. No occlusion or significant stenosis. No aneurysm. RIGHT POSTERIOR CEREBRAL ARTERY: Unremarkable. No occlusion or significant stenosis. No aneurysm. RIGHT VERTEBRAL ARTERY: Unremarkable as visualized. LEFT

## 2024-04-19 NOTE — H&P
Hospitalist - History & Physical      Patient: Gallardo E Athens    Unit/Bed:42/042A  YOB: 1950  MRN: [id number]   Acct: [id number]   PCP: Neri Covington MD      Assessment and Plan:        Dizziness:   Pt reports a remote history of vertigo and crystals in his ears \"over 10 years ago\" - chart review shows labyrinthitis from 1996 and BPPV from 2020  Ondansetron and meclizine started in ER  CT and CTAs negative - MRI ordered  Continue ondansetron 4mg q4 and meclizine 25mg q6  Consult OT  Fall precautions  Accelerated junctional rhythm:   New onset  Consult cardiology   Essential HTN:   Chronic, uncontrolled  192/95 on arrival  Continue amlodipine 2.5mg daily  HLD:   Chronic, controlled  Continue atorvastatin 20mg hs  Pre-diabetes:   Chronic  A1c 6.2% 7/31/23  Follow up outpatient  Rash:   Chronic, uncontrolled  Follow with dermatology outpatient   BPH with nocturia:  Chronic, controlled  Continue tamsulosin 0.4mg daily    CC:  dizziness and vomiting    HPI: Pt reports to the ED today with his daughter with complaints of dizziness and vomiting. Pt states the dizziness began after getting up from his chair to answer his phone around 5:30pm. Dizziness subsided when pt sat back down in his chair but came back when he tried to use the restroom. Pt states the room was spinning and he began vomiting at home. Pt then went to  where he was transported to the ED. Pt  states the room begins spinning again anytime he tries to stand, moves his head forward or backward, or lies down. Pt states he began with a HA after returning from CT scan. Pt endorses some associated ST, lower abdominal pain, and neck pain.  Pt is being admitted for further workup of progressive dizziness and nausea.     ROS: Review of Systems   Constitutional:  Negative for diaphoresis and fever.   HENT:  Positive for sore throat. Negative for drooling and facial swelling.    Eyes:  Positive for visual disturbance. Negative for photophobia  Platelets 132 130 - 400 thou/mm3    MPV 11.9 9.4 - 12.4 fL    Seg Neutrophils 81.0 %    Lymphocytes 12.3 %    Monocytes 4.5 %    Eosinophils 1.4 %    Basophils 0.5 %    Immature Granulocytes % 0.3 %    Segs Absolute 5.4 1.8 - 7.7 thou/mm3    Lymphocytes Absolute 0.8 (L) 1.0 - 4.8 thou/mm3    Monocytes Absolute 0.3 (L) 0.4 - 1.3 thou/mm3    Eosinophils Absolute 0.1 0.0 - 0.4 thou/mm3    Basophils Absolute 0.0 0.0 - 0.1 thou/mm3    Immature Grans (Abs) 0.02 0.00 - 0.07 thou/mm3    nRBC 0 /100 wbc   Basic Metabolic Panel    Collection Time: 04/18/24  8:00 PM   Result Value Ref Range    Sodium 139 135 - 145 meq/L    Potassium 4.2 3.5 - 5.2 meq/L    Chloride 100 98 - 111 meq/L    CO2 25 23 - 33 meq/L    Glucose 151 (H) 70 - 108 mg/dL    BUN 24 (H) 7 - 22 mg/dL    Creatinine 1.2 0.4 - 1.2 mg/dL    Calcium 9.5 8.5 - 10.5 mg/dL   Troponin    Collection Time: 04/18/24  8:00 PM   Result Value Ref Range    Troponin, High Sensitivity 8 0 - 12 ng/L   Anion Gap    Collection Time: 04/18/24  8:00 PM   Result Value Ref Range    Anion Gap 14.0 8.0 - 16.0 meq/L   Glomerular Filtration Rate, Estimated    Collection Time: 04/18/24  8:00 PM   Result Value Ref Range    Est, Glom Filt Rate 63 >60 ml/min/1.73m2   Osmolality    Collection Time: 04/18/24  8:00 PM   Result Value Ref Range    Osmolality Calc 284.5 275.0 - 300.0 mOsmol/kg   COVID-19 & Influenza Combo    Collection Time: 04/18/24  8:56 PM    Specimen: Nasopharyngeal Swab   Result Value Ref Range    SARS-CoV-2 RNA, RT PCR NOT DETECTED NOT DETECTED    INFLUENZA A NOT DETECTED NOT DETECTED    INFLUENZA B NOT DETECTED NOT DETECTED         Vital Signs: T: 97.1 P: 62 RR: 17 B/P: 166/88: FiO2: RA: O2 Sat: 97: I/O: No intake or output data in the 24 hours ending 04/18/24 9723      General:   Pt sitting comfortably in bed in no acute distress, non toxic appearing  HEENT:  normocephalic and atraumatic.  No scleral icterus. PEARLA, mucous membranes dry. Horizontal right sided nystagmus

## 2024-04-22 ENCOUNTER — CARE COORDINATION (OUTPATIENT)
Dept: CASE MANAGEMENT | Age: 74
End: 2024-04-22

## 2024-04-22 DIAGNOSIS — R42 DIZZINESS: Primary | ICD-10-CM

## 2024-04-22 PROCEDURE — 1111F DSCHRG MED/CURRENT MED MERGE: CPT | Performed by: FAMILY MEDICINE

## 2024-04-22 NOTE — CARE COORDINATION
Care Transitions Note    Initial Call - Call within 2 business days of discharge: Yes     Patient Current Location:  Home: [address] [place] [id number]    Care Transition Nurse contacted the patient by telephone to perform post hospital discharge assessment, verified name and  as identifiers. Provided introduction to self, and explanation of the Care Transition Nurse role.     Patient: Gallardo E Norma    Patient : 1950   MRN: [id number]    Reason for Admission: dizziness  Discharge Date: 24  RURS: Readmission Risk Score: 10.5      Last Discharge Facility       Date Complaint Diagnosis Description Type Department Provider    24 Dizziness Dizziness ... ED to Hosp-Admission (Discharged) (ADMITTED) Georges Meeks MD; Xiomara Ch...            Was this an external facility discharge? No    Additional needs identified to be addressed with provider   No needs identified             Method of communication with provider: none.    Patients top risk factors for readmission: lack of knowledge about disease and medical condition-HTN, dizziness    Interventions to address risk factors:   Education: dizziness  Review of patient management of conditions/medications  Communication with providers: HFU scheduled    Care Summary Note:  Spoke with \"Mack\", said he is doing ok, taking it easy.  Is sitting outside while his dtr is helping with his wife who has dementia.  Said he was dizzy when he got up this morning, took Antivert.  Is being very cautious not to make any sudden position changes.  Reviewed medications/changes, taking as directed.  Appetite and fluid intake is good.  No issues with B&B.  Has HFU with PCP tomorrow but he thought it was pushed out, said he will check his calendar.  He will drive if he is up to it but if not his dtr will take him.  Vestibular rehab was recommended if needed.  He will discuss with PCP.  No other issues to report.  Denies any other needs.  No other questions or

## 2024-04-23 ENCOUNTER — OFFICE VISIT (OUTPATIENT)
Dept: FAMILY MEDICINE CLINIC | Age: 74
End: 2024-04-23

## 2024-04-23 VITALS
HEART RATE: 60 BPM | SYSTOLIC BLOOD PRESSURE: 134 MMHG | BODY MASS INDEX: 26.11 KG/M2 | HEIGHT: 70 IN | OXYGEN SATURATION: 98 % | DIASTOLIC BLOOD PRESSURE: 88 MMHG | WEIGHT: 182.4 LBS | RESPIRATION RATE: 18 BRPM | TEMPERATURE: 97.6 F

## 2024-04-23 DIAGNOSIS — Z09 HOSPITAL DISCHARGE FOLLOW-UP: ICD-10-CM

## 2024-04-23 DIAGNOSIS — H81.10 BENIGN PAROXYSMAL POSITIONAL VERTIGO, UNSPECIFIED LATERALITY: Primary | ICD-10-CM

## 2024-04-23 DIAGNOSIS — R42 DIZZINESS: ICD-10-CM

## 2024-04-23 NOTE — PROGRESS NOTES
Post-Discharge Transitional Care Follow Up      Sami Green   YOB: 1950    Date of Office Visit:  4/23/2024  Date of Hospital Admission: 4/18/24  Date of Hospital Discharge: 4/19/24  Readmission Risk Score (high >=14%. Medium >=10%):Readmission Risk Score: 10.5      Care management risk score Rising risk (score 2-5) and Complex Care (Scores >=6): No Risk Score On File     Non face to face  following discharge, date last encounter closed (first attempt may have been earlier): 04/22/2024     Call initiated 2 business days of discharge: Yes     Benign paroxysmal positional vertigo, unspecified laterality  -     [facility] [place]  Dizziness  -     [facility] [place]  Hospital discharge follow-up  -     MO DISCHARGE MEDS RECONCILED W/ CURRENT OUTPATIENT MED LIST      Medical Decision Making: low complexity  Return if symptoms worsen or fail to improve.         Status post hospitalization for dizziness likely due to BPPV.  Improving.  Continue Antivert as needed.  Referral to PT for vestibular rehab.    Subjective:   HPI    Inpatient course: Discharge summary reviewed- see chart.    Interval history/Current status: dizziness admission.  Symptoms resolved with epley.    Nausea and dizziness (mild) this morning.  Small headache.  No leg or arm weakness. More tired.  No speech problems. Used antivert this morning which helps.     Patient Active Problem List   Diagnosis    Benign prostatic hyperplasia with nocturia    Mixed hyperlipidemia    Psoriasis    Pre-diabetes    Primary hypertension    Dizziness    Benign paroxysmal positional vertigo       Medications listed as ordered at the time of discharge from hospital     Medication List            Accurate as of April 23, 2024  8:31 AM. If you have any questions, ask your nurse or doctor.                CONTINUE taking these medications      amLODIPine 2.5 MG tablet  Commonly known as: NORVASC  Take 1 tablet by mouth daily

## 2024-04-24 LAB
EKG ATRIAL RATE: 69 BPM
EKG P AXIS: 20 DEGREES
EKG P-R INTERVAL: 172 MS
EKG Q-T INTERVAL: 558 MS
EKG QRS DURATION: 88 MS
EKG QTC CALCULATION (BAZETT): 597 MS
EKG T AXIS: 75 DEGREES
EKG VENTRICULAR RATE: 69 BPM

## 2024-04-29 ENCOUNTER — CARE COORDINATION (OUTPATIENT)
Dept: CASE MANAGEMENT | Age: 74
End: 2024-04-29

## 2024-04-29 NOTE — CARE COORDINATION
Care Transitions Follow Up Call    Patient Current Location:  Home: [address] [place] [id number]    Care Transition Nurse contacted the patient by telephone to follow up after admission on 24.  Verified name and  with patient as identifiers.    Patient: Sami Green  Patient : 1950   MRN: [id number]  Reason for Admission: dizziness  Discharge Date: 24 RARS: Readmission Risk Score: 10.5      Needs to be reviewed by the provider   Additional needs identified to be addressed with provider: No  none             Method of communication with provider: none.    CTN call to Mack today and he says he other than being tired, he feels good. Denies dizziness, n/v/d, sob, chest pain, swelling, malaise, fever, chills.  Discussed drowsiness side effect of Meclizine.  He says he has not taken any in the last 2 days. He has a few pills left and will contact PCP office for RF.  Discussed benefits of RPM and he says he prefers to self monitor.  At home BP monitoring education given. He expressed understanding and will begin taking and logging daily BP today. He has a log available.  PCP HFU 24-> [place] (scheduled for 24) MH=606/88  Denies need for transportation.  Eating, drinking & sleeping ok. Denies problems w/ urination/bowels.  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  No other concerns voiced at this time. Will continue to follow.    Addressed changes since last contact:  medications-RF Meclizine per PCP  Discussed follow-up appointments. If no appointment was previously scheduled, appointment scheduling offered: Yes.   Is follow up appointment scheduled within 7 days of discharge? Yes.    Follow Up  Future Appointments   Date Time Provider Department Center   2024  8:45 AM Geetha Cárdenas PT [facility] [place] [facility]   2024  8:45 AM Neri Covington MD [facility] [place]   10/15/2024  7:45 AM Jojo Rodriguez APRN - CNP [facility] [place] [facility] [place]   2025  8:45 AM Yariel Powers

## 2024-05-06 ENCOUNTER — HOSPITAL ENCOUNTER (OUTPATIENT)
Dept: PHYSICAL THERAPY | Age: 74
Setting detail: THERAPIES SERIES
Discharge: HOME | End: 2024-05-06
Payer: MEDICARE

## 2024-05-06 ENCOUNTER — CARE COORDINATION (OUTPATIENT)
Dept: CASE MANAGEMENT | Age: 74
End: 2024-05-06

## 2024-05-06 PROCEDURE — 95992 CANALITH REPOSITIONING PROC: CPT

## 2024-05-06 PROCEDURE — 97530 THERAPEUTIC ACTIVITIES: CPT

## 2024-05-06 PROCEDURE — 97161 PT EVAL LOW COMPLEX 20 MIN: CPT

## 2024-05-06 NOTE — CARE COORDINATION
Care Transitions Follow Up Call    Patient: Gallardo E Norma  Patient : 1950   MRN: <[id number]>  Reason for Admission: Dizziness  Discharge Date: 24 RARS: Readmission Risk Score: 10.5    Attempted to contact patient for follow up transition call. Left voicemail message to return call with an update on condition since discharge. Contact information provided. Will continue to follow up.        Follow Up  Future Appointments   Date Time Provider Department Center   2024  9:45 AM Shandra Jones PT [facility] [place] [facility]   2024  3:15 PM Geetha Cárdenas, PT [facility] [place] [facility]   2024 10:00 AM Shandra Jones PT [facility] [place] [facility]   2024 10:00 AM Geetha Cárdenas, PT [facility] [place] [facility]   2024 10:45 AM Geetha Cárdenas, PT [facility] [place] [facility]   2024  8:45 AM Neri Covington MD [facility] [place]   10/15/2024  7:45 AM Jojo Rodriguez, APRN - CNP [facility] [place] [facility] [place]   2025  8:45 AM Yariel Powers MD N [facility] - [place]         Care Transitions Subsequent and Final Call    Subsequent and Final Calls  Care Transitions Interventions    Physical Therapy: Completed       Transportation Support: Declined   Other Interventions:             Carla Stein LPN

## 2024-05-06 NOTE — PROGRESS NOTES
** PLEASE SIGN, DATE AND TIME CERTIFICATION BELOW AND RETURN TO [facility] OUTPATIENT REHABILITATION (FAX #: [phone]).  ATTEST/CO-SIGN IF ACCESSING VIA [employer].  THANK YOU.**    I certify that I have examined the patient below and determined that Physical Medicine and Rehabilitation service is necessary and that I approve the established plan of care for up to 90 days or as specifically noted.  Attestation, signature or co-signature of physician indicates approval of certification requirements.    ________________________ ____________ __________  Physician Signature   Date   Time   [facility]  PHYSICAL THERAPY  [x] EVALUATION  [] DAILY NOTE (LAND) [] DAILY NOTE (AQUATIC ) [] PROGRESS NOTE [] DISCHARGE NOTE    [] OUTPATIENT REHABILITATION CENTER [facility]   [x] [facility] CARE [place]    [] [facility]   [] [facility]    Date: 2024  Patient Name:  Sami Green  : 1950  MRN: [id number]  CSN: [id number]    Referring Practitioner Neri Covington MD    Diagnosis Benign paroxysmal vertigo, unspecified ear  Dizziness and giddiness   Treatment Diagnosis H81.13  Benign paroxysmal vertigo, bilateral  H81.93 Vestibular dysfunction; bilaterally  R26.89  Abnormalities of gait and mobility  R26.81  Unsteadiness on feet  R42   Dizziness and giddiness  R53.1 Weakness   Date of Evaluation 24    Additional Pertinent History  has a past medical history of BPH (benign prostatic hyperplasia), COVID-19 (2020), Hepatitis B, Hyperlipidemia, Hypertension (2022), Neuropathy (), and Psoriasis.       Functional Outcome Measure Used Dizziness handicap inventory   Functional Outcome Score Eval 30/100 (24)       Insurance: Primary: Payor: MEDICARE /  /  / ,   Secondary: [employer]   Authorization Information:  INSURANCE PAYS AT:   80%              PATIENT RESPONSIBILITY AND/OR CO-PAY:  20%  SECONDARY INSURANCE COMPANY: [employer].       PRECERTIFICATION REQUIRED:

## 2024-05-06 NOTE — PROGRESS NOTES
Physician Progress Note      PATIENT:               ZOHAIB BECK  CSN #:                  [id number]  :                       1950  ADMIT DATE:       2024 6:34 PM  DISCH DATE:        2024 4:27 PM  RESPONDING  PROVIDER #:        JAIRO ARECHIGA          QUERY TEXT:    Patient was admitted with Dizziness. Pt noted to have BPH with nocturia and   was treated with Flomax in H&P note on .?If possible, please document in   progress notes and discharge summary if you are evaluating and/or treating any   of the following:    The medical record reflects the following:  Risk Factors: Age 73 M, BPH  Clinical Indicators:  H&P noted BPH with nocturia and was treated with   Flomax.  Treatment: Flomax.    Thanks,  Melissa Alejandra.  Options provided:  -- BPH with partial/complete urinary obstruction  -- BPH with urinary retention without obstruction  -- Other - I will add my own diagnosis  -- Disagree - Not applicable / Not valid  -- Disagree - Clinically unable to determine / Unknown  -- Refer to Clinical Documentation Reviewer    PROVIDER RESPONSE TEXT:    This patient has BPH with urinary retention without obstruction.    Query created by: Melissa Fuchs on 2024 1:45 AM      Electronically signed by:  JAIRO ARECHIGA 2024 8:06 AM

## 2024-05-09 ENCOUNTER — CARE COORDINATION (OUTPATIENT)
Dept: CASE MANAGEMENT | Age: 74
End: 2024-05-09

## 2024-05-09 ENCOUNTER — HOSPITAL ENCOUNTER (OUTPATIENT)
Dept: PHYSICAL THERAPY | Age: 74
Setting detail: THERAPIES SERIES
Discharge: HOME | End: 2024-05-09
Payer: MEDICARE

## 2024-05-09 PROCEDURE — 97112 NEUROMUSCULAR REEDUCATION: CPT

## 2024-05-09 NOTE — CARE COORDINATION
Care Transitions Follow Up Call    Patient Current Location:  [facility] [place] Care Coordinator contacted the patient by telephone to follow up after admission on .  Verified name and  with patient as identifiers.    Patient: Sami Green  Patient : 1950   MRN: <[id number]>  Reason for Admission: Dizziness   Discharge Date: 24 RARS: Readmission Risk Score: 10.5      Needs to be reviewed by the provider   Additional needs identified to be addressed with provider: No  none             Method of communication with provider: none.    Spoke with Sami Green who returned my call. Patient stated that he was doing good. Patient stated that he is currently at outpatient PT. Patient stated that he do PT two times a day. Patient stated that so far it is working patient stated that he the dizziness is subsiding. Patient denied cp, sob, cough, headache, n/v, diarrhea, abdominal pains, fever, or chills. Patient report that appetite and fluid intake is good and denied any problems with bowel or bladder. Patient reported that he is taking all medications as ordered. Patient denied any other needs at this time. Patient instructed to continue to monitor s/s, reporting any that may present to MD immediately for early intervention.  Patient is agreeable to f/u calls.     Addressed changes since last contact:  none  Discussed follow-up appointments. If no appointment was previously scheduled, appointment scheduling offered: Yes.   Is follow up appointment scheduled within 7 days of discharge? Yes.    Follow Up  Future Appointments   Date Time Provider Department Center   2024  3:15 PM Geetha Cárdenas PT [facility] [place] [facility]   2024 10:00 AM Shandra Jones PT [facility] [place] [facility]   2024 10:00 AM Geetha Cárdenas PT [facility] [place] [facility]   2024 10:45 AM Geetha Cárdenas PT [facility] [place] [facility]   2024  8:45 AM Neri Covington MD [facility] [place]   10/15/2024  7:45 AM

## 2024-05-09 NOTE — PROGRESS NOTES
[facility]  PHYSICAL THERAPY  [] EVALUATION  [x] DAILY NOTE (LAND) [] DAILY NOTE (AQUATIC ) [] PROGRESS NOTE [] DISCHARGE NOTE    [] [facility] [place]   [x] [facility]    [] [facility]   [] [facility]    Date: 2024  Patient Name:  Sami Green  : 1950  MRN: [id number]  [facility]: [id number]    Referring Practitioner Neri Covington MD    Diagnosis Benign paroxysmal vertigo, unspecified ear  Dizziness and giddiness   Treatment Diagnosis H81.13  Benign paroxysmal vertigo, bilateral  H81.93 Vestibular dysfunction; bilaterally  R26.89  Abnormalities of gait and mobility  R26.81  Unsteadiness on feet  R42   Dizziness and giddiness  R53.1 Weakness   Date of Evaluation 24    Additional Pertinent History  has a past medical history of BPH (benign prostatic hyperplasia), COVID-19 (2020), Hepatitis B, Hyperlipidemia, Hypertension (2022), Neuropathy (), and Psoriasis.       Functional Outcome Measure Used Dizziness handicap inventory   Functional Outcome Score Eval 30/100 (24)       Insurance: Primary: Payor: MEDICARE /  /  / ,   Secondary: [employer]   Authorization Information:  INSURANCE PAYS AT:   80%              PATIENT RESPONSIBILITY AND/OR CO-PAY:  20%  SECONDARY INSURANCE COMPANY: [employer] SUPP.       PRECERTIFICATION REQUIRED:  No  INSURANCE THERAPY BENEFIT:  Patient has unlimited visits based on medical necessity  Benefit will not cover maintenance or preventative treatment.  AQUATIC THERAPY COVERED:   Yes  MODALITIES COVERED:  Yes, with the exception of iontophoresis and hot packs/cold packs  TELEHEALTH COVERED: Yes  REFERENCE #: NA   Approved Procedure Codes: 61847 - Therapeutic Exercise  , 35858 - Therapeutic Activities, and 79255 - Canalith Repositioning Procedure  (Codes requested indicated by red font, codes approved indicated by black font)   Visit # 2, 2/10 for progress note (Reporting Period: 24 to

## 2024-05-14 ENCOUNTER — HOSPITAL ENCOUNTER (OUTPATIENT)
Dept: PHYSICAL THERAPY | Age: 74
Setting detail: THERAPIES SERIES
Discharge: HOME | End: 2024-05-14
Payer: MEDICARE

## 2024-05-14 PROCEDURE — 97530 THERAPEUTIC ACTIVITIES: CPT

## 2024-05-14 PROCEDURE — 95992 CANALITH REPOSITIONING PROC: CPT

## 2024-05-14 NOTE — PROGRESS NOTES
to  6/6/24     )   Visits Allowed: unlimited   Recertification Date: 8/6/24   Physician Follow-Up: 9/11/24   Physician Orders:    History of Present Illness: 4/18 went from sit to stand from recliner and severe dizziness, vomitting, daughter brought patient to [facility], squad to [facility], multiple testing negative for heart or stroke.  Dx with BPPV, given meclizine, has been trying not to take meds due to severe fatigue, last pill 1 week ago.   Is moving really slowly to avoid dizziness.  Reports bending forward causes dizziness 3/10, looking up dizziness 1/10, denies dizziness rolling R/L, turning to right \"weird feeling\" 3/10, turning to left 1/10.  B feet numbness/pain- 3 falls in past year , most recent Mid Jan- \"I can't feel my feet all the time\".       SUBJECTIVE:   Patient reports did have some dizziness last night but notes high blood pressure last night 186/92 or 98 sitting at rest, today back down to 165/77.  Patient reports able to roll to right and left now with no dizziness.  Reports feeling \"off balance \"  sit to stand from couch.   Able to bend over sitting with no dizziness, able to sit and look up with no dizziness.       OBJECTIVE:  TREATMENT   Precautions: Fall risk   Pain: 2/10 B knee    \"X” in shaded column indicates activity completed today    “*\" next to exercise/intervention indicates progression   Modalities Parameters/  Location  Notes                     Manual Therapy Time/Technique  Notes                     Exercise/Intervention   Notes   Occulomotor testing:  Smooth pursuit:  R: mild nystagmus noted  L: -  Saccades: R: mild nystagmus noted  L:  minimal nystagmus noted  Head thrust:  R: - L: -   x           R adam fernandez pike negative  x    R canalith repositioning maneuver               L Adam Hallpike + dizziness mild 3/10 with mild nystagmus noted  x           Roll test: L positive for spinning/wall moving, R negative    Short duration          Canalith repositioning manauever (CRM) for left AP

## 2024-05-16 ENCOUNTER — HOSPITAL ENCOUNTER (OUTPATIENT)
Dept: PHYSICAL THERAPY | Age: 74
Setting detail: THERAPIES SERIES
Discharge: HOME | End: 2024-05-16
Payer: MEDICARE

## 2024-05-16 ENCOUNTER — CARE COORDINATION (OUTPATIENT)
Dept: CASE MANAGEMENT | Age: 74
End: 2024-05-16

## 2024-05-16 PROCEDURE — 97112 NEUROMUSCULAR REEDUCATION: CPT

## 2024-05-16 NOTE — PROGRESS NOTES
[facility]  PHYSICAL THERAPY  [] EVALUATION  [x] DAILY NOTE (LAND) [] DAILY NOTE (AQUATIC ) [] PROGRESS NOTE [] DISCHARGE NOTE    [] [facility] [place]   [x] [facility]    [] [facility]   [] [facility]    Date: 2024  Patient Name:  Sami Green  : 1950  MRN: [id number]  CSN: [id number]    Referring Practitioner Neri Covington MD    Diagnosis Benign paroxysmal vertigo, unspecified ear  Dizziness and giddiness   Treatment Diagnosis H81.13  Benign paroxysmal vertigo, bilateral  H81.93 Vestibular dysfunction; bilaterally  R26.89  Abnormalities of gait and mobility  R26.81  Unsteadiness on feet  R42   Dizziness and giddiness  R53.1 Weakness   Date of Evaluation 24    Additional Pertinent History  has a past medical history of BPH (benign prostatic hyperplasia), COVID-19 (2020), Hepatitis B, Hyperlipidemia, Hypertension (2022), Neuropathy (), and Psoriasis.       Functional Outcome Measure Used Dizziness handicap inventory   Functional Outcome Score Eval 30/100 (24)       Insurance: Primary: Payor: MEDICARE /  /  / ,   Secondary: [employer]   Authorization Information:  INSURANCE PAYS AT:   80%              PATIENT RESPONSIBILITY AND/OR CO-PAY:  20%  SECONDARY INSURANCE COMPANY: [employer] SUPP.       PRECERTIFICATION REQUIRED:  No  INSURANCE THERAPY BENEFIT:  Patient has unlimited visits based on medical necessity  Benefit will not cover maintenance or preventative treatment.  AQUATIC THERAPY COVERED:   Yes  MODALITIES COVERED:  Yes, with the exception of iontophoresis and hot packs/cold packs  TELEHEALTH COVERED: Yes  REFERENCE #: NA   Approved Procedure Codes: 49269 - Therapeutic Exercise  , 59264 - Therapeutic Activities, and 48472 - Canalith Repositioning Procedure  (Codes requested indicated by red font, codes approved indicated by black font)   Visit # 4, 4/10 for progress note (Reporting Period: 24

## 2024-05-16 NOTE — CARE COORDINATION
Care Transitions Note    Follow Up Call      Attempted to reach patient for transitions of care follow up.  Unable to reach patient.      Outreach Attempts:   HIPAA compliant voicemail left for patient.       Follow Up Appointment:   Future Appointments         Provider Specialty Dept Phone    5/21/2024 10:00 AM Geetha Cárdenas, PT Physical Therapy [phone]    5/23/2024 10:45 AM Geetha Cárdenas PT Physical Therapy [phone]    9/11/2024 8:45 AM Neri Covington MD Family Medicine [phone]    10/15/2024 7:45 AM Jojo Rodriguez, APRN - [facility] [phone]    2/14/2025 8:45 AM Yariel Powers MD Cardiology [phone]            Plan for follow-up on next business day.  based on severity of symptoms and risk factors. Plan for next call: symptom management-new or worsening symptoms, dizziness  Final call    Radha Hicks RN

## 2024-05-17 ENCOUNTER — CARE COORDINATION (OUTPATIENT)
Dept: CASE MANAGEMENT | Age: 74
End: 2024-05-17

## 2024-05-17 NOTE — CARE COORDINATION
Care Transitions Note    Final Call      Attempted to reach patient for transitions of care follow up.  Unable to reach patient.  If no return call, CTN will sign off-2nd attempt.    Outreach Attempts:   HIPAA compliant voicemail left for patient.   UTR letter not sent, end of program    Patient closed (disengaged) from the Care Transitions program on 5/17/24.     Handoff:   Patient was not referred to the ACM team due to unable to contact patient.      Assessments:  Care Transitions Subsequent and Final Call    Subsequent and Final Calls  Care Transitions Interventions  Other Interventions:              Upcoming Appointments:    Future Appointments         Provider Specialty Dept Phone    5/21/2024 10:00 AM Geetha Cárdenas, PT Physical Therapy [phone]    5/23/2024 10:45 AM Geetha Cárdenas, PT Physical Therapy [phone]    9/11/2024 8:45 AM Neri Covington MD Family Medicine [phone]    10/15/2024 7:45 AM Jojo Rodriguez, APRN - [facility] U[facility] [phone]    2/14/2025 8:45 AM Yariel Powers MD Cardiology [phone]            Radha Hicks RN

## 2024-05-21 ENCOUNTER — HOSPITAL ENCOUNTER (OUTPATIENT)
Dept: PHYSICAL THERAPY | Age: 74
Setting detail: THERAPIES SERIES
Discharge: HOME | End: 2024-05-21
Payer: MEDICARE

## 2024-05-21 PROCEDURE — 97530 THERAPEUTIC ACTIVITIES: CPT

## 2024-05-21 PROCEDURE — 97110 THERAPEUTIC EXERCISES: CPT

## 2024-05-21 NOTE — PROGRESS NOTES
[facility]  PHYSICAL THERAPY  [] EVALUATION  [x] DAILY NOTE (LAND) [] DAILY NOTE (AQUATIC ) [] PROGRESS NOTE [] DISCHARGE NOTE    [] [facility] [place]   [x] [place] [facility]    [] [facility]   [] [facility]    Date: 2024  Patient Name:  Sami Green  : 1950  MRN: [id number]  CSN: [id number]    Referring Practitioner Neri Covington MD    Diagnosis Benign paroxysmal vertigo, unspecified ear  Dizziness and giddiness   Treatment Diagnosis H81.13  Benign paroxysmal vertigo, bilateral  H81.93 Vestibular dysfunction; bilaterally  R26.89  Abnormalities of gait and mobility  R26.81  Unsteadiness on feet  R42   Dizziness and giddiness  R53.1 Weakness   Date of Evaluation 24    Additional Pertinent History  has a past medical history of BPH (benign prostatic hyperplasia), COVID-19 (2020), Hepatitis B, Hyperlipidemia, Hypertension (2022), Neuropathy (), and Psoriasis.       Functional Outcome Measure Used Dizziness handicap inventory   Functional Outcome Score Eval 30/100 (24)       Insurance: Primary: Payor: MEDICARE /  /  / ,   Secondary: [employer]   Authorization Information:  INSURANCE PAYS AT:   80%              PATIENT RESPONSIBILITY AND/OR CO-PAY:  20%  SECONDARY INSURANCE COMPANY: [employer] SUPP.       PRECERTIFICATION REQUIRED:  No  INSURANCE THERAPY BENEFIT:  Patient has unlimited visits based on medical necessity  Benefit will not cover maintenance or preventative treatment.  AQUATIC THERAPY COVERED:   Yes  MODALITIES COVERED:  Yes, with the exception of iontophoresis and hot packs/cold packs  TELEHEALTH COVERED: Yes  REFERENCE #: NA   Approved Procedure Codes: 11429 - Therapeutic Exercise  , 52293 - Therapeutic Activities, and 02057 - Canalith Repositioning Procedure  (Codes requested indicated by red font, codes approved indicated by black font)   Visit # 4, 4/10 for progress note (Reporting Period: 24

## 2024-05-23 ENCOUNTER — APPOINTMENT (OUTPATIENT)
Dept: PHYSICAL THERAPY | Age: 74
End: 2024-05-23
Payer: MEDICARE

## 2024-06-04 ENCOUNTER — HOSPITAL ENCOUNTER (OUTPATIENT)
Dept: PHYSICAL THERAPY | Age: 74
Setting detail: THERAPIES SERIES
Discharge: HOME | End: 2024-06-04
Payer: MEDICARE

## 2024-06-04 PROCEDURE — 97530 THERAPEUTIC ACTIVITIES: CPT

## 2024-06-04 PROCEDURE — 97110 THERAPEUTIC EXERCISES: CPT

## 2024-06-04 NOTE — DISCHARGE SUMMARY
maneuver as needed, progress to vestibular exercise, VOR and balance as needed    Activity/Treatment Tolerance:  [x]  Patient tolerated treatment well  []  Patient limited by fatigue  []  Patient limited by pain   []  Patient limited by medical complications  []  Other:     Assessment: Oculomotor all negative today, all BPPV abolished, discharge to HEP.  GOALS:  Patient Goal: to get my dizziness to go away    Short Term Goals:  Time Frame: deferred to LT's    Long Term Goals:  Time Frame: 6 weeks  Patient to report able to get in/out of bed with no dizziness.  MET  Patient to report able to sit to stand with no dizziness.  MET  Patient to report able to move head right and left with no dizziness.  MET  Improved Timed up and go to 15 seconds with no LOB.  13 SECONDS NO LOB, MET    Patient Education:   [x]  HEP/Education Completed: REVIEWED HEP daily  []  No new Education completed  []  Reviewed Prior HEP      [x]  Patient verbalized and/or demonstrated understanding of education provided.  []  Patient unable to verbalize and/or demonstrate understanding of education provided.  Will continue education.  [x]  Barriers to learning: handout needed    PLAN:  Treatment Recommendations: Strengthening, Balance Training, Gait Training, Home Exercise Program, Patient Education, Positioning, and Integrative Dry Needling    []  Plan of care initiated.  Plan to see patient 2 times per week for 6 weeks to address the treatment planned outlined above.  []  Continue with current plan of care  []  Modify plan of care as follows:    []  Hold pending physician visit  [x]  Discharge    Time In 1000   Time Out 1040   Timed Code Minutes: 40 min   Total Treatment Time: 40 min       Electronically Signed by: Geetha Cárdenas, PT

## 2024-06-11 ENCOUNTER — OFFICE VISIT (OUTPATIENT)
Dept: FAMILY MEDICINE CLINIC | Age: 74
End: 2024-06-11

## 2024-06-11 VITALS
HEART RATE: 72 BPM | DIASTOLIC BLOOD PRESSURE: 80 MMHG | OXYGEN SATURATION: 97 % | BODY MASS INDEX: 26.48 KG/M2 | HEIGHT: 70 IN | TEMPERATURE: 97.5 F | SYSTOLIC BLOOD PRESSURE: 126 MMHG | RESPIRATION RATE: 16 BRPM | WEIGHT: 185 LBS
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
## 2024-06-11 DIAGNOSIS — L30.9 DERMATITIS: ICD-10-CM

## 2024-06-11 ASSESSMENT — ENCOUNTER SYMPTOMS
SHORTNESS OF BREATH: 0
COLOR CHANGE: 1

## 2024-06-11 NOTE — PROGRESS NOTES
Constitutional:       General: He is not in acute distress.     Appearance: He is not ill-appearing.   Pulmonary:      Effort: Pulmonary effort is normal. No respiratory distress.   Neurological:      Mental Status: He is alert. Mental status is at baseline.   Psychiatric:         Mood and Affect: Mood normal.         Behavior: Behavior normal.     RUE: No swelling of the arm.  No tenderness of the elbow.  No joint effusion of the elbow    Right forearm as seen below: Area of tenderness is about 7 mm diameter within the encircled area.  Erythematous flat lesions with irregular borders as seen below.  No scaling.  No drainage.  No fluctuance.  No crepitus      Impression/Plan:  1. Bug bite, initial encounter  Acute uncomplicated problem.  OTC meds.  Suspected bug bite with some dermatitis.  [place] decision to hold on antibiotic.  May use steroid cream for the red lesions as he has Kenalog at home.  Benadryl cream as needed for itching.  If it worsens or changes, he will contact us so we can prescribe an antibiotic such as Keflex    2. Dermatitis      They voiced understanding.  All questions answered. They agreed with treatment plan.   See patient instructions for any educational materials that may have been given.  Discussed use, benefit, and side effects of prescribed medications.     Reviewed health maintenance.    (Please note that portions of this note may have been completed with a voice recognition program.  Efforts were made to edit the dictation but occasionally words are mis-transcribed.)    Return if symptoms worsen or fail to improve.      Electronically signed by Neri Covington MD on 6/11/2024 at 1:54 PM

## 2024-08-11 RX ORDER — METHYLPREDNISOLONE 4 MG
TABLET, DOSE PACK ORAL
Qty: 21 TABLET | OUTPATIENT
Start: 2024-08-11

## 2024-09-11 ENCOUNTER — OFFICE VISIT (OUTPATIENT)
Dept: FAMILY MEDICINE CLINIC | Age: 74
End: 2024-09-11

## 2024-09-11 ENCOUNTER — HOSPITAL ENCOUNTER (OUTPATIENT)
Age: 74
Discharge: HOME | End: 2024-09-11
Payer: MEDICARE

## 2024-09-11 VITALS
SYSTOLIC BLOOD PRESSURE: 152 MMHG | OXYGEN SATURATION: 98 % | HEIGHT: 70 IN | WEIGHT: 180 LBS | TEMPERATURE: 97.6 F | RESPIRATION RATE: 18 BRPM | DIASTOLIC BLOOD PRESSURE: 94 MMHG | HEART RATE: 73 BPM | BODY MASS INDEX: 25.77 KG/M2

## 2024-09-11 DIAGNOSIS — I10 PRIMARY HYPERTENSION: ICD-10-CM

## 2024-09-11 DIAGNOSIS — Z00.00 MEDICARE ANNUAL WELLNESS VISIT, SUBSEQUENT: Primary | ICD-10-CM

## 2024-09-11 DIAGNOSIS — E78.2 MIXED HYPERLIPIDEMIA: ICD-10-CM

## 2024-09-11 DIAGNOSIS — R73.09 ELEVATED GLUCOSE: ICD-10-CM

## 2024-09-11 DIAGNOSIS — R97.20 ELEVATED PSA: ICD-10-CM

## 2024-09-11 LAB
ALBUMIN SERPL BCG-MCNC: 4.6 G/DL (ref 3.5–5.1)
ALP SERPL-CCNC: 106 U/L (ref 38–126)
ALT SERPL W/O P-5'-P-CCNC: 20 U/L (ref 11–66)
ANION GAP SERPL CALC-SCNC: 10 MEQ/L (ref 8–16)
AST SERPL-CCNC: 17 U/L (ref 5–40)
BILIRUB SERPL-MCNC: 0.4 MG/DL (ref 0.3–1.2)
BUN SERPL-MCNC: 23 MG/DL (ref 7–22)
CALCIUM SERPL-MCNC: 9.8 MG/DL (ref 8.5–10.5)
CHLORIDE SERPL-SCNC: 103 MEQ/L (ref 98–111)
CHOLEST SERPL-MCNC: 270 MG/DL (ref 100–199)
CO2 SERPL-SCNC: 26 MEQ/L (ref 23–33)
CREAT SERPL-MCNC: 1.2 MG/DL (ref 0.4–1.2)
DEPRECATED MEAN GLUCOSE BLD GHB EST-ACNC: 129 MG/DL (ref 70–126)
DEPRECATED RDW RBC AUTO: 45.1 FL (ref 35–45)
ERYTHROCYTE [DISTWIDTH] IN BLOOD BY AUTOMATED COUNT: 13.3 % (ref 11.5–14.5)
GFR SERPL CREATININE-BSD FRML MDRD: 63 ML/MIN/1.73M2
GLUCOSE SERPL-MCNC: 105 MG/DL (ref 70–108)
HBA1C MFR BLD HPLC: 6.3 % (ref 4.4–6.4)
HCT VFR BLD AUTO: 43.1 % (ref 42–52)
HDLC SERPL-MCNC: 29 MG/DL
HGB BLD-MCNC: 14.3 GM/DL (ref 14–18)
LDLC SERPL CALC-MCNC: ABNORMAL MG/DL
MCH RBC QN AUTO: 30.6 PG (ref 26–33)
MCHC RBC AUTO-ENTMCNC: 33.2 GM/DL (ref 32.2–35.5)
MCV RBC AUTO: 92.3 FL (ref 80–94)
PLATELET # BLD AUTO: 167 THOU/MM3 (ref 130–400)
PMV BLD AUTO: 12.4 FL (ref 9.4–12.4)
POTASSIUM SERPL-SCNC: 4.5 MEQ/L (ref 3.5–5.2)
PROT SERPL-MCNC: 7.2 G/DL (ref 6.1–8)
PSA SERPL-MCNC: 4.85 NG/ML (ref 0–1)
RBC # BLD AUTO: 4.67 MILL/MM3 (ref 4.7–6.1)
SODIUM SERPL-SCNC: 139 MEQ/L (ref 135–145)
TRIGL SERPL-MCNC: 585 MG/DL (ref 0–199)
WBC # BLD AUTO: 5.8 THOU/MM3 (ref 4.8–10.8)

## 2024-09-11 PROCEDURE — 84153 ASSAY OF PSA TOTAL: CPT

## 2024-09-11 PROCEDURE — 80061 LIPID PANEL: CPT

## 2024-09-11 PROCEDURE — 83036 HEMOGLOBIN GLYCOSYLATED A1C: CPT

## 2024-09-11 PROCEDURE — 36415 COLL VENOUS BLD VENIPUNCTURE: CPT

## 2024-09-11 PROCEDURE — 80053 COMPREHEN METABOLIC PANEL: CPT

## 2024-09-11 PROCEDURE — 85027 COMPLETE CBC AUTOMATED: CPT

## 2024-09-11 RX ORDER — AMLODIPINE BESYLATE 5 MG/1
5 TABLET ORAL DAILY
Qty: 90 TABLET | Refills: 3 | Status: SHIPPED | OUTPATIENT
Start: 2024-09-11

## 2024-09-11 ASSESSMENT — PATIENT HEALTH QUESTIONNAIRE - PHQ9
1. LITTLE INTEREST OR PLEASURE IN DOING THINGS: NOT AT ALL
SUM OF ALL RESPONSES TO PHQ QUESTIONS 1-9: 0
SUM OF ALL RESPONSES TO PHQ QUESTIONS 1-9: 0
SUM OF ALL RESPONSES TO PHQ9 QUESTIONS 1 & 2: 0
SUM OF ALL RESPONSES TO PHQ QUESTIONS 1-9: 0
2. FEELING DOWN, DEPRESSED OR HOPELESS: NOT AT ALL
SUM OF ALL RESPONSES TO PHQ QUESTIONS 1-9: 0

## 2024-10-07 ENCOUNTER — OFFICE VISIT (OUTPATIENT)
Dept: FAMILY MEDICINE CLINIC | Age: 74
End: 2024-10-07
Payer: MEDICARE

## 2024-10-07 VITALS
HEART RATE: 56 BPM | BODY MASS INDEX: 26.03 KG/M2 | DIASTOLIC BLOOD PRESSURE: 72 MMHG | OXYGEN SATURATION: 97 % | SYSTOLIC BLOOD PRESSURE: 134 MMHG | WEIGHT: 181.4 LBS

## 2024-10-07 DIAGNOSIS — R60.0 EDEMA OF LEFT LOWER EXTREMITY: ICD-10-CM

## 2024-10-07 DIAGNOSIS — I10 PRIMARY HYPERTENSION: Primary | ICD-10-CM

## 2024-10-07 PROCEDURE — G8427 DOCREV CUR MEDS BY ELIG CLIN: HCPCS | Performed by: NURSE PRACTITIONER

## 2024-10-07 PROCEDURE — 1123F ACP DISCUSS/DSCN MKR DOCD: CPT | Performed by: NURSE PRACTITIONER

## 2024-10-07 PROCEDURE — 1036F TOBACCO NON-USER: CPT | Performed by: NURSE PRACTITIONER

## 2024-10-07 PROCEDURE — 99213 OFFICE O/P EST LOW 20 MIN: CPT | Performed by: NURSE PRACTITIONER

## 2024-10-07 PROCEDURE — 3017F COLORECTAL CA SCREEN DOC REV: CPT | Performed by: NURSE PRACTITIONER

## 2024-10-07 PROCEDURE — 3078F DIAST BP <80 MM HG: CPT | Performed by: NURSE PRACTITIONER

## 2024-10-07 PROCEDURE — G8417 CALC BMI ABV UP PARAM F/U: HCPCS | Performed by: NURSE PRACTITIONER

## 2024-10-07 PROCEDURE — G8484 FLU IMMUNIZE NO ADMIN: HCPCS | Performed by: NURSE PRACTITIONER

## 2024-10-07 PROCEDURE — 3075F SYST BP GE 130 - 139MM HG: CPT | Performed by: NURSE PRACTITIONER

## 2024-10-07 SDOH — ECONOMIC STABILITY: FOOD INSECURITY: WITHIN THE PAST 12 MONTHS, THE FOOD YOU BOUGHT JUST DIDN'T LAST AND YOU DIDN'T HAVE MONEY TO GET MORE.: NEVER TRUE

## 2024-10-07 SDOH — ECONOMIC STABILITY: INCOME INSECURITY: HOW HARD IS IT FOR YOU TO PAY FOR THE VERY BASICS LIKE FOOD, HOUSING, MEDICAL CARE, AND HEATING?: NOT HARD AT ALL

## 2024-10-07 SDOH — ECONOMIC STABILITY: FOOD INSECURITY: WITHIN THE PAST 12 MONTHS, YOU WORRIED THAT YOUR FOOD WOULD RUN OUT BEFORE YOU GOT MONEY TO BUY MORE.: NEVER TRUE

## 2024-10-07 ASSESSMENT — ENCOUNTER SYMPTOMS
CONSTIPATION: 1
BLURRED VISION: 1
SHORTNESS OF BREATH: 1

## 2024-10-07 NOTE — PROGRESS NOTES
normal.         Thought Content: Thought content normal.         Judgment: Judgment normal.         Impression/Plan:  1. Primary hypertension  Assessment & Plan:   Chronic, at goal (stable), continue current treatment plan and lifestyle modifications recommended  2. Edema of left lower extremity  Comments:  Improving. Will call if does not resolve. Advised of red flag symptoms.    They voiced understanding.  All questions answered. They agreed with treatment plan.   See patient instructions for any educational materials that may have been given.  Discussed use, benefit, and side effects of prescribed medications.     Reviewed health maintenance.    (Please note that portions of this note may have been completed with a voice recognition program.  Efforts were made to edit the dictation but occasionally words are mis-transcribed.)    Return in about 6 months (around 4/7/2025) for HTN with Adria.      Electronically signed by PRAMOD Lucero CNP on 10/7/2024 at 10:37 AM

## 2024-10-15 ENCOUNTER — OFFICE VISIT (OUTPATIENT)
Dept: UROLOGY | Age: 74
End: 2024-10-15
Payer: MEDICARE

## 2024-10-15 VITALS — HEIGHT: 70 IN | BODY MASS INDEX: 25.91 KG/M2 | RESPIRATION RATE: 20 BRPM | WEIGHT: 181 LBS

## 2024-10-15 DIAGNOSIS — R97.20 ELEVATED PSA: ICD-10-CM

## 2024-10-15 DIAGNOSIS — N40.1 BENIGN PROSTATIC HYPERPLASIA WITH NOCTURIA: Primary | ICD-10-CM

## 2024-10-15 DIAGNOSIS — R35.1 BENIGN PROSTATIC HYPERPLASIA WITH NOCTURIA: Primary | ICD-10-CM

## 2024-10-15 LAB — POST VOID RESIDUAL (PVR): 70 ML

## 2024-10-15 PROCEDURE — G8417 CALC BMI ABV UP PARAM F/U: HCPCS | Performed by: NURSE PRACTITIONER

## 2024-10-15 PROCEDURE — 99214 OFFICE O/P EST MOD 30 MIN: CPT | Performed by: NURSE PRACTITIONER

## 2024-10-15 PROCEDURE — 51798 US URINE CAPACITY MEASURE: CPT | Performed by: NURSE PRACTITIONER

## 2024-10-15 PROCEDURE — 1123F ACP DISCUSS/DSCN MKR DOCD: CPT | Performed by: NURSE PRACTITIONER

## 2024-10-15 PROCEDURE — G8484 FLU IMMUNIZE NO ADMIN: HCPCS | Performed by: NURSE PRACTITIONER

## 2024-10-15 PROCEDURE — G8427 DOCREV CUR MEDS BY ELIG CLIN: HCPCS | Performed by: NURSE PRACTITIONER

## 2024-10-15 PROCEDURE — 3017F COLORECTAL CA SCREEN DOC REV: CPT | Performed by: NURSE PRACTITIONER

## 2024-10-15 PROCEDURE — 1036F TOBACCO NON-USER: CPT | Performed by: NURSE PRACTITIONER

## 2024-10-15 RX ORDER — TAMSULOSIN HYDROCHLORIDE 0.4 MG/1
0.4 CAPSULE ORAL DAILY
Qty: 90 CAPSULE | Refills: 3 | Status: SHIPPED | OUTPATIENT
Start: 2024-10-15

## 2024-10-15 ASSESSMENT — ENCOUNTER SYMPTOMS
VOMITING: 0
ABDOMINAL PAIN: 0
BACK PAIN: 0
NAUSEA: 0

## 2024-10-15 NOTE — PROGRESS NOTES
times daily as needed for Dizziness 15 tablet 0     No current facility-administered medications for this visit.       Past Medical History  Gallardo  has a past medical history of BPH (benign prostatic hyperplasia), COVID-19, Hepatitis B, Hyperlipidemia, Hypertension, Neuropathy, and Psoriasis.    Past Surgical History  The patient  has a past surgical history that includes back surgery (2015) and Mouth surgery (1968).    Family History  This patient's family history includes Asthma in his father; Cancer in his brother and sister; Emphysema in his father; High Cholesterol in his mother.    Social History  Gallardo  reports that he has never smoked. He has never used smokeless tobacco. He reports that he does not drink alcohol and does not use drugs.      Subjective:      Review of Systems   Constitutional:  Negative for activity change, appetite change, chills, diaphoresis, fatigue, fever and unexpected weight change.   Gastrointestinal:  Negative for abdominal pain, nausea and vomiting.   Genitourinary:  Positive for frequency. Negative for decreased urine volume, difficulty urinating, dysuria, flank pain, hematuria and urgency.   Musculoskeletal:  Negative for back pain.       Objective:   Resp 20   Ht 1.778 m (5' 10\")   Wt 82.1 kg (181 lb)   BMI 25.97 kg/m²     Physical Exam  Vitals reviewed.   Constitutional:       General: He is not in acute distress.     Appearance: Normal appearance. He is well-developed. He is not ill-appearing or diaphoretic.   HENT:      Head: Normocephalic and atraumatic.      Right Ear: External ear normal.      Left Ear: External ear normal.      Nose: Nose normal.      Mouth/Throat:      Mouth: Mucous membranes are moist.   Eyes:      General: No scleral icterus.        Right eye: No discharge.         Left eye: No discharge.   Neck:      Vascular: No JVD.      Trachea: No tracheal deviation.   Cardiovascular:      Rate and Rhythm: Normal rate and regular rhythm.   Pulmonary:      Effort:

## 2024-12-16 ENCOUNTER — PATIENT MESSAGE (OUTPATIENT)
Dept: UROLOGY | Age: 74
End: 2024-12-16

## 2024-12-17 ENCOUNTER — HOSPITAL ENCOUNTER (OUTPATIENT)
Age: 74
Discharge: HOME | End: 2024-12-17
Payer: MEDICARE

## 2024-12-17 DIAGNOSIS — R97.20 ELEVATED PSA: ICD-10-CM

## 2024-12-17 DIAGNOSIS — R35.1 BENIGN PROSTATIC HYPERPLASIA WITH NOCTURIA: ICD-10-CM

## 2024-12-17 DIAGNOSIS — N40.1 BENIGN PROSTATIC HYPERPLASIA WITH NOCTURIA: ICD-10-CM

## 2024-12-17 LAB — PSA SERPL-MCNC: 3.77 NG/ML (ref 0–1)

## 2024-12-17 PROCEDURE — 36415 COLL VENOUS BLD VENIPUNCTURE: CPT

## 2024-12-17 PROCEDURE — 84153 ASSAY OF PSA TOTAL: CPT

## 2024-12-19 ENCOUNTER — OFFICE VISIT (OUTPATIENT)
Dept: UROLOGY | Age: 74
End: 2024-12-19
Payer: MEDICARE

## 2024-12-19 VITALS
WEIGHT: 179 LBS | DIASTOLIC BLOOD PRESSURE: 68 MMHG | SYSTOLIC BLOOD PRESSURE: 148 MMHG | BODY MASS INDEX: 25.62 KG/M2 | HEIGHT: 70 IN

## 2024-12-19 DIAGNOSIS — N40.1 BENIGN PROSTATIC HYPERPLASIA WITH NOCTURIA: Primary | ICD-10-CM

## 2024-12-19 DIAGNOSIS — R35.1 BENIGN PROSTATIC HYPERPLASIA WITH NOCTURIA: Primary | ICD-10-CM

## 2024-12-19 PROCEDURE — G8484 FLU IMMUNIZE NO ADMIN: HCPCS | Performed by: NURSE PRACTITIONER

## 2024-12-19 PROCEDURE — 99214 OFFICE O/P EST MOD 30 MIN: CPT | Performed by: NURSE PRACTITIONER

## 2024-12-19 PROCEDURE — 1036F TOBACCO NON-USER: CPT | Performed by: NURSE PRACTITIONER

## 2024-12-19 PROCEDURE — 1123F ACP DISCUSS/DSCN MKR DOCD: CPT | Performed by: NURSE PRACTITIONER

## 2024-12-19 PROCEDURE — 3078F DIAST BP <80 MM HG: CPT | Performed by: NURSE PRACTITIONER

## 2024-12-19 PROCEDURE — 3017F COLORECTAL CA SCREEN DOC REV: CPT | Performed by: NURSE PRACTITIONER

## 2024-12-19 PROCEDURE — 1159F MED LIST DOCD IN RCRD: CPT | Performed by: NURSE PRACTITIONER

## 2024-12-19 PROCEDURE — G8417 CALC BMI ABV UP PARAM F/U: HCPCS | Performed by: NURSE PRACTITIONER

## 2024-12-19 PROCEDURE — 3077F SYST BP >= 140 MM HG: CPT | Performed by: NURSE PRACTITIONER

## 2024-12-19 PROCEDURE — G8427 DOCREV CUR MEDS BY ELIG CLIN: HCPCS | Performed by: NURSE PRACTITIONER

## 2024-12-19 ASSESSMENT — ENCOUNTER SYMPTOMS
BACK PAIN: 0
VOMITING: 0
NAUSEA: 0
ABDOMINAL PAIN: 0

## 2024-12-19 NOTE — PROGRESS NOTES
[facility] [place] [facility]  [facility]OLOG[facility]  [address]  [address]  [place] [address]  Dept: [phone]  Loc: [phone]    Visit Date: 12/19/2024        HPI:     Sami Green is a 74 y.o. male who presents today for:  Chief Complaint   Patient presents with    Benign Prostatic Hypertrophy       HPI    Pt seen in follow up for BPH and elevated psa.      Pt moved to [place] from [place] in 2018 where he was following with a Dr. Forrest Craft at [facility].  Prior PSAs noted of 3.78 4/28/17 and 2.86 11/30/17.  PSA 9/14/18 3.18.  Pt denies any family hx of breast or prostate cancer.  No known abnormal TANISHA in the past.  He has been on Flomax 0.4 mg daily since December of 2017.      Pt reports urinary symptoms stable.  No irritative voiding symptoms or dysuria.        Current Outpatient Medications   Medication Sig Dispense Refill    tamsulosin (FLOMAX) 0.4 MG capsule Take 1 capsule by mouth daily 90 capsule 3    atorvastatin (LIPITOR) 20 MG tablet Take 1 tablet by mouth nightly 90 tablet 3    ibuprofen (ADVIL) 200 MG tablet Take 2 tablets by mouth every 8 hours as needed for Pain 120 tablet 3    amLODIPine (NORVASC) 5 MG tablet Take 1 tablet by mouth daily 90 tablet 3    meclizine (ANTIVERT) 12.5 MG tablet Take 1 tablet by mouth 3 times daily as needed for Dizziness 15 tablet 0    triamcinolone (KENALOG) 0.1 % cream Apply topically 2 times daily. (Patient not taking: Reported on 12/19/2024) 1 each 0    Melatonin 10 MG TABS Take by mouth daily as needed (Patient not taking: Reported on 12/19/2024)      zinc sulfate (ZINCATE) 220 (50 Zn) MG capsule Take 1 capsule by mouth daily (Patient not taking: Reported on 12/19/2024) 30 capsule 3    vitamin C (VITAMIN C) 1000 MG tablet Take 1 tablet by mouth daily (Patient not taking: Reported on 12/19/2024) 30 tablet 3    Vitamin D (CHOLECALCIFEROL) 50 MCG (2000 UT) TABS tablet Take 1 tablet by mouth daily (Patient not taking: Reported

## 2025-02-14 ENCOUNTER — OFFICE VISIT (OUTPATIENT)
Dept: CARDIOLOGY CLINIC | Age: 75
End: 2025-02-14
Payer: MEDICARE

## 2025-02-14 VITALS
SYSTOLIC BLOOD PRESSURE: 148 MMHG | BODY MASS INDEX: 26.05 KG/M2 | HEART RATE: 66 BPM | OXYGEN SATURATION: 98 % | WEIGHT: 182 LBS | HEIGHT: 70 IN | DIASTOLIC BLOOD PRESSURE: 82 MMHG

## 2025-02-14 DIAGNOSIS — I10 PRIMARY HYPERTENSION: Primary | ICD-10-CM

## 2025-02-14 DIAGNOSIS — E78.01 FAMILIAL HYPERCHOLESTEROLEMIA: ICD-10-CM

## 2025-02-14 PROCEDURE — 1123F ACP DISCUSS/DSCN MKR DOCD: CPT | Performed by: NUCLEAR MEDICINE

## 2025-02-14 PROCEDURE — 99213 OFFICE O/P EST LOW 20 MIN: CPT | Performed by: NUCLEAR MEDICINE

## 2025-02-14 PROCEDURE — G8427 DOCREV CUR MEDS BY ELIG CLIN: HCPCS | Performed by: NUCLEAR MEDICINE

## 2025-02-14 PROCEDURE — 3079F DIAST BP 80-89 MM HG: CPT | Performed by: NUCLEAR MEDICINE

## 2025-02-14 PROCEDURE — 3017F COLORECTAL CA SCREEN DOC REV: CPT | Performed by: NUCLEAR MEDICINE

## 2025-02-14 PROCEDURE — 1036F TOBACCO NON-USER: CPT | Performed by: NUCLEAR MEDICINE

## 2025-02-14 PROCEDURE — 3077F SYST BP >= 140 MM HG: CPT | Performed by: NUCLEAR MEDICINE

## 2025-02-14 PROCEDURE — G8417 CALC BMI ABV UP PARAM F/U: HCPCS | Performed by: NUCLEAR MEDICINE

## 2025-02-14 PROCEDURE — 1159F MED LIST DOCD IN RCRD: CPT | Performed by: NUCLEAR MEDICINE

## 2025-02-14 NOTE — PATIENT INSTRUCTIONS
Your nurses today were Diamond  Your provider today was Dr. Tinsley   Phone number: [phone]

## 2025-02-14 NOTE — PROGRESS NOTES
[facility] [place] [facility]  [facility]OLOG[facility]  [address]  [address]  [place] [address]  Dept: [phone]  Dept Fax: [phone]  Loc: [phone]    Visit Date: 2/14/2025    Sami Green is a 74 y.o. male who presents todayfor:  Chief Complaint   Patient presents with    1 Year Follow Up    Hypertension    Hyperlipidemia     Know risk for CAD  Know borderline HTN and hyperlipidemia  Some stress at home  No chest pain   No changes in breathing  some dizziness at times ( vertigo type)  No syncope  BP is stable on no specific medical RX   On statins for hyperlipidemia    HPI:  HPI  Past Medical History:   Diagnosis Date    BPH (benign prostatic hyperplasia)     COVID-19 11/12/2020    Hepatitis B     Hyperlipidemia     Hypertension 06/08/2022    Neuropathy 2021    Psoriasis       Past Surgical History:   Procedure Laterality Date    BACK SURGERY  2015    Herniated disk     MOUTH SURGERY  1968     Family History   Problem Relation Age of Onset    High Cholesterol Mother     Emphysema Father     Asthma Father     Cancer Sister     Cancer Brother      Social History     Tobacco Use    Smoking status: Never    Smokeless tobacco: Never    Tobacco comments:     As a child my parents were heavy smokers   Substance Use Topics    Alcohol use: Never      Current Outpatient Medications   Medication Sig Dispense Refill    tamsulosin (FLOMAX) 0.4 MG capsule Take 1 capsule by mouth daily 90 capsule 3    atorvastatin (LIPITOR) 20 MG tablet Take 1 tablet by mouth nightly 90 tablet 3    ibuprofen (ADVIL) 200 MG tablet Take 2 tablets by mouth every 8 hours as needed for Pain 120 tablet 3     No current facility-administered medications for this visit.     Allergies   Allergen Reactions    Crestor [Rosuvastatin Calcium]     Benadryl [Diphenhydramine] Other (See Comments)     Jitter, hyperactivity if takes more than one day      Health Maintenance   Topic Date Due    COVID-19 Vaccine (1 - 2024-25

## 2025-03-10 SDOH — ECONOMIC STABILITY: FOOD INSECURITY: WITHIN THE PAST 12 MONTHS, YOU WORRIED THAT YOUR FOOD WOULD RUN OUT BEFORE YOU GOT MONEY TO BUY MORE.: NEVER TRUE

## 2025-03-10 SDOH — ECONOMIC STABILITY: INCOME INSECURITY: IN THE LAST 12 MONTHS, WAS THERE A TIME WHEN YOU WERE NOT ABLE TO PAY THE MORTGAGE OR RENT ON TIME?: NO

## 2025-03-10 SDOH — ECONOMIC STABILITY: FOOD INSECURITY: WITHIN THE PAST 12 MONTHS, THE FOOD YOU BOUGHT JUST DIDN'T LAST AND YOU DIDN'T HAVE MONEY TO GET MORE.: NEVER TRUE

## 2025-03-10 SDOH — ECONOMIC STABILITY: TRANSPORTATION INSECURITY
IN THE PAST 12 MONTHS, HAS THE LACK OF TRANSPORTATION KEPT YOU FROM MEDICAL APPOINTMENTS OR FROM GETTING MEDICATIONS?: NO

## 2025-03-10 ASSESSMENT — PATIENT HEALTH QUESTIONNAIRE - PHQ9
2. FEELING DOWN, DEPRESSED OR HOPELESS: NOT AT ALL
1. LITTLE INTEREST OR PLEASURE IN DOING THINGS: NOT AT ALL
SUM OF ALL RESPONSES TO PHQ9 QUESTIONS 1 & 2: 0
1. LITTLE INTEREST OR PLEASURE IN DOING THINGS: NOT AT ALL
SUM OF ALL RESPONSES TO PHQ QUESTIONS 1-9: 0
2. FEELING DOWN, DEPRESSED OR HOPELESS: NOT AT ALL
SUM OF ALL RESPONSES TO PHQ QUESTIONS 1-9: 0

## 2025-03-11 ENCOUNTER — OFFICE VISIT (OUTPATIENT)
Dept: FAMILY MEDICINE CLINIC | Age: 75
End: 2025-03-11
Payer: MEDICARE

## 2025-03-11 VITALS
DIASTOLIC BLOOD PRESSURE: 102 MMHG | HEART RATE: 61 BPM | WEIGHT: 178 LBS | HEIGHT: 70 IN | OXYGEN SATURATION: 97 % | TEMPERATURE: 97.1 F | BODY MASS INDEX: 25.48 KG/M2 | SYSTOLIC BLOOD PRESSURE: 150 MMHG

## 2025-03-11 DIAGNOSIS — I10 PRIMARY HYPERTENSION: Primary | ICD-10-CM

## 2025-03-11 DIAGNOSIS — E78.2 MIXED HYPERLIPIDEMIA: ICD-10-CM

## 2025-03-11 PROCEDURE — 1159F MED LIST DOCD IN RCRD: CPT | Performed by: FAMILY MEDICINE

## 2025-03-11 PROCEDURE — 1036F TOBACCO NON-USER: CPT | Performed by: FAMILY MEDICINE

## 2025-03-11 PROCEDURE — 99214 OFFICE O/P EST MOD 30 MIN: CPT | Performed by: FAMILY MEDICINE

## 2025-03-11 PROCEDURE — 1160F RVW MEDS BY RX/DR IN RCRD: CPT | Performed by: FAMILY MEDICINE

## 2025-03-11 PROCEDURE — 3017F COLORECTAL CA SCREEN DOC REV: CPT | Performed by: FAMILY MEDICINE

## 2025-03-11 PROCEDURE — 3080F DIAST BP >= 90 MM HG: CPT | Performed by: FAMILY MEDICINE

## 2025-03-11 PROCEDURE — 1123F ACP DISCUSS/DSCN MKR DOCD: CPT | Performed by: FAMILY MEDICINE

## 2025-03-11 PROCEDURE — G8427 DOCREV CUR MEDS BY ELIG CLIN: HCPCS | Performed by: FAMILY MEDICINE

## 2025-03-11 PROCEDURE — G8417 CALC BMI ABV UP PARAM F/U: HCPCS | Performed by: FAMILY MEDICINE

## 2025-03-11 PROCEDURE — 3077F SYST BP >= 140 MM HG: CPT | Performed by: FAMILY MEDICINE

## 2025-03-11 RX ORDER — AMLODIPINE BESYLATE 5 MG/1
5 TABLET ORAL DAILY
Qty: 30 TABLET | Refills: 0 | Status: SHIPPED | OUTPATIENT
Start: 2025-03-11

## 2025-03-11 RX ORDER — ATORVASTATIN CALCIUM 20 MG/1
20 TABLET, FILM COATED ORAL NIGHTLY
Qty: 90 TABLET | Refills: 3 | Status: SHIPPED | OUTPATIENT
Start: 2025-03-11

## 2025-03-11 ASSESSMENT — ENCOUNTER SYMPTOMS: SHORTNESS OF BREATH: 0

## 2025-03-11 NOTE — PROGRESS NOTES
Attending Supervising Physician's Attestation Statement  The patient is a 74 y.o. male. I have performed a history and physical examination of the patient. I discussed the case with the resident physician.    I reviewed the patient's Past Medical History, Past Surgical History, Medications, and Allergies.     Physical Exam:  Vitals:    03/11/25 0833   BP: (!) 162/100   BP Site: Left Upper Arm   Patient Position: Sitting   Pulse: 61   Temp: 97.1 °F (36.2 °C)   SpO2: 97%   Weight: 80.7 kg (178 lb)   Height: 1.778 m (5' 10\")       Gen:  No distress.  Well developed and well nourished.  Cardiac: Regular rate and rhythm.  No murmur. Occasional skipped beat  Pulmonary: Clear to auscultation bilaterally.  No wheezes, rales, or rhonchi.  No respiratory distress  Neurological: Alert.  Psych: Normal mood and affect.  Normal attention.  Normal behavior      Impression/Plan  I reviewed and agree with the findings and plan documented in his note .     Diagnosis Orders   1. Primary hypertension  amLODIPine (NORVASC) 5 MG tablet      2. Mixed hyperlipidemia  atorvastatin (LIPITOR) 20 MG tablet        Hypertension: Chronic.  Uncontrolled.  Restart Norvasc    Hyperlipidemia: Likely uncontrolled.  Chronic.  Restart Lipitor    Electronically signed by Neri Covington MD on 3/11/25 at 9:00 AM EDT      
discussion around DNR status.  Stating he wants some intervention but if there was decrease chances he would not want prolonged care.  Based on discussion it seems that a living will would be more appropriate at this time, with additional DNR discussion.      They voiced understanding.  All questions answered. They agreed with treatment plan.   See patient instructions for any educational materials that may have been given.  Discussed use, benefit, and side effects of prescribed medications.     Reviewed health maintenance.    (Please note that portions of this note may have been completed with a voice recognition program.  Efforts were made to edit the dictation but occasionally words are mis-transcribed.)    Return in about 27 days (around 4/7/2025) for as scheduled.      Electronically signed by Oscar Diaz MD on 3/11/2025 at 8:43 AM

## 2025-04-15 ENCOUNTER — OFFICE VISIT (OUTPATIENT)
Dept: FAMILY MEDICINE CLINIC | Age: 75
End: 2025-04-15
Payer: MEDICARE

## 2025-04-15 VITALS
OXYGEN SATURATION: 97 % | HEART RATE: 73 BPM | WEIGHT: 182.2 LBS | SYSTOLIC BLOOD PRESSURE: 158 MMHG | DIASTOLIC BLOOD PRESSURE: 90 MMHG | BODY MASS INDEX: 26.98 KG/M2 | HEIGHT: 69 IN

## 2025-04-15 DIAGNOSIS — E78.2 MIXED HYPERLIPIDEMIA: ICD-10-CM

## 2025-04-15 DIAGNOSIS — N40.1 BENIGN PROSTATIC HYPERPLASIA WITH NOCTURIA: ICD-10-CM

## 2025-04-15 DIAGNOSIS — R35.1 BENIGN PROSTATIC HYPERPLASIA WITH NOCTURIA: ICD-10-CM

## 2025-04-15 DIAGNOSIS — I10 PRIMARY HYPERTENSION: Primary | ICD-10-CM

## 2025-04-15 PROCEDURE — G8417 CALC BMI ABV UP PARAM F/U: HCPCS | Performed by: FAMILY MEDICINE

## 2025-04-15 PROCEDURE — 1160F RVW MEDS BY RX/DR IN RCRD: CPT | Performed by: FAMILY MEDICINE

## 2025-04-15 PROCEDURE — 1159F MED LIST DOCD IN RCRD: CPT | Performed by: FAMILY MEDICINE

## 2025-04-15 PROCEDURE — 3079F DIAST BP 80-89 MM HG: CPT | Performed by: FAMILY MEDICINE

## 2025-04-15 PROCEDURE — 3077F SYST BP >= 140 MM HG: CPT | Performed by: FAMILY MEDICINE

## 2025-04-15 PROCEDURE — 3017F COLORECTAL CA SCREEN DOC REV: CPT | Performed by: FAMILY MEDICINE

## 2025-04-15 PROCEDURE — 1036F TOBACCO NON-USER: CPT | Performed by: FAMILY MEDICINE

## 2025-04-15 PROCEDURE — 99214 OFFICE O/P EST MOD 30 MIN: CPT | Performed by: FAMILY MEDICINE

## 2025-04-15 PROCEDURE — 1123F ACP DISCUSS/DSCN MKR DOCD: CPT | Performed by: FAMILY MEDICINE

## 2025-04-15 PROCEDURE — G8427 DOCREV CUR MEDS BY ELIG CLIN: HCPCS | Performed by: FAMILY MEDICINE

## 2025-04-15 RX ORDER — AMLODIPINE BESYLATE 10 MG/1
10 TABLET ORAL DAILY
Qty: 30 TABLET | Refills: 1 | Status: SHIPPED | OUTPATIENT
Start: 2025-04-15

## 2025-04-15 SDOH — ECONOMIC STABILITY: FOOD INSECURITY: WITHIN THE PAST 12 MONTHS, YOU WORRIED THAT YOUR FOOD WOULD RUN OUT BEFORE YOU GOT MONEY TO BUY MORE.: NEVER TRUE

## 2025-04-15 SDOH — ECONOMIC STABILITY: FOOD INSECURITY: WITHIN THE PAST 12 MONTHS, THE FOOD YOU BOUGHT JUST DIDN'T LAST AND YOU DIDN'T HAVE MONEY TO GET MORE.: NEVER TRUE

## 2025-04-15 ASSESSMENT — ENCOUNTER SYMPTOMS
WHEEZING: 0
SHORTNESS OF BREATH: 0

## 2025-04-15 NOTE — PROGRESS NOTES
[facility] PROFESSIONAL [facility] FAMILY MEDICINE  [address]  [facility] [address]  Dept: [phone]  Dept Fax: [phone]  Loc: [phone]  PROGRESS NOTE      Visit Date: 4/15/2025    Sami Green is a 74 y.o. male who presents today for:  Chief Complaint   Patient presents with    Hypertension       Chief complaint:  HTN    Subjective:  Hypertension  Pertinent negatives include no chest pain or shortness of breath.       5 week f/u    HTN:  restarted norvasc at last appt.  Reviewed BP log.  Mostly -140s at home.  No hypotensive episodes. Feels tired.     Hyperlipemia:  restarted lipitor at last appt.  Leg cramps the past few days    BPH.  On flomax.  Wakes 2x per night to urinate. Mild dribbling.  Weak stream.     Review of Systems   Respiratory:  Negative for shortness of breath and wheezing.    Cardiovascular:  Negative for chest pain and leg swelling.   Neurological:  Negative for dizziness and light-headedness.     Patient Active Problem List   Diagnosis    Benign prostatic hyperplasia with nocturia    Mixed hyperlipidemia    Psoriasis    Pre-diabetes    Primary hypertension    Dizziness    Benign paroxysmal positional vertigo     Past Medical History:   Diagnosis Date    BPH (benign prostatic hyperplasia)     COVID-19 11/12/2020    Hepatitis B     Hyperlipidemia     Hypertension 06/08/2022    Neuropathy 2021    Psoriasis       Past Surgical History:   Procedure Laterality Date    BACK SURGERY  2015    Herniated disk     MOUTH SURGERY  1968     Family History   Problem Relation Age of Onset    High Cholesterol Mother     Emphysema Father     Asthma Father     Cancer Sister     Cancer Brother      Social History     Tobacco Use    Smoking status: Never    Smokeless tobacco: Never    Tobacco comments:     As a child my parents were heavy smokers   Substance Use Topics    Alcohol use: Never      Current Outpatient Medications   Medication Sig Dispense Refill

## 2025-04-22 ENCOUNTER — HOSPITAL ENCOUNTER (OUTPATIENT)
Age: 75
Discharge: HOME | End: 2025-04-22
Payer: MEDICARE

## 2025-04-22 ENCOUNTER — RESULTS FOLLOW-UP (OUTPATIENT)
Dept: FAMILY MEDICINE CLINIC | Age: 75
End: 2025-04-22

## 2025-04-22 DIAGNOSIS — I10 PRIMARY HYPERTENSION: ICD-10-CM

## 2025-04-22 DIAGNOSIS — E78.2 MIXED HYPERLIPIDEMIA: ICD-10-CM

## 2025-04-22 LAB
ALBUMIN SERPL BCG-MCNC: 4.4 G/DL (ref 3.4–4.9)
ALP SERPL-CCNC: 127 U/L (ref 40–129)
ALT SERPL W/O P-5'-P-CCNC: 30 U/L (ref 10–50)
ANION GAP SERPL CALC-SCNC: 10 MEQ/L (ref 8–16)
AST SERPL-CCNC: 20 U/L (ref 10–50)
BILIRUB SERPL-MCNC: 0.4 MG/DL (ref 0.3–1.2)
BUN SERPL-MCNC: 30 MG/DL (ref 8–23)
CALCIUM SERPL-MCNC: 9.9 MG/DL (ref 8.8–10.2)
CHLORIDE SERPL-SCNC: 106 MEQ/L (ref 98–111)
CHOLEST SERPL-MCNC: 170 MG/DL (ref 100–199)
CO2 SERPL-SCNC: 27 MEQ/L (ref 22–29)
CREAT SERPL-MCNC: 1.2 MG/DL (ref 0.7–1.2)
GFR SERPL CREATININE-BSD FRML MDRD: 63 ML/MIN/1.73M2
GLUCOSE SERPL-MCNC: 111 MG/DL (ref 74–109)
HDLC SERPL-MCNC: 35 MG/DL
LDLC SERPL CALC-MCNC: 111 MG/DL
POTASSIUM SERPL-SCNC: 4.6 MEQ/L (ref 3.5–5.2)
PROT SERPL-MCNC: 6.9 G/DL (ref 6.4–8.3)
SODIUM SERPL-SCNC: 143 MEQ/L (ref 135–145)
TRIGL SERPL-MCNC: 122 MG/DL (ref 0–199)

## 2025-04-22 PROCEDURE — 80053 COMPREHEN METABOLIC PANEL: CPT

## 2025-04-22 PROCEDURE — 36415 COLL VENOUS BLD VENIPUNCTURE: CPT

## 2025-04-22 PROCEDURE — 80061 LIPID PANEL: CPT

## 2025-05-15 ENCOUNTER — OFFICE VISIT (OUTPATIENT)
Dept: FAMILY MEDICINE CLINIC | Age: 75
End: 2025-05-15
Payer: MEDICARE

## 2025-05-15 VITALS
HEART RATE: 81 BPM | WEIGHT: 183 LBS | OXYGEN SATURATION: 95 % | DIASTOLIC BLOOD PRESSURE: 84 MMHG | HEIGHT: 69 IN | SYSTOLIC BLOOD PRESSURE: 144 MMHG | BODY MASS INDEX: 27.11 KG/M2

## 2025-05-15 DIAGNOSIS — I10 PRIMARY HYPERTENSION: ICD-10-CM

## 2025-05-15 PROCEDURE — G8427 DOCREV CUR MEDS BY ELIG CLIN: HCPCS | Performed by: FAMILY MEDICINE

## 2025-05-15 PROCEDURE — 1159F MED LIST DOCD IN RCRD: CPT | Performed by: FAMILY MEDICINE

## 2025-05-15 PROCEDURE — 3078F DIAST BP <80 MM HG: CPT | Performed by: FAMILY MEDICINE

## 2025-05-15 PROCEDURE — 99214 OFFICE O/P EST MOD 30 MIN: CPT | Performed by: FAMILY MEDICINE

## 2025-05-15 PROCEDURE — 3077F SYST BP >= 140 MM HG: CPT | Performed by: FAMILY MEDICINE

## 2025-05-15 PROCEDURE — G8417 CALC BMI ABV UP PARAM F/U: HCPCS | Performed by: FAMILY MEDICINE

## 2025-05-15 PROCEDURE — 1123F ACP DISCUSS/DSCN MKR DOCD: CPT | Performed by: FAMILY MEDICINE

## 2025-05-15 PROCEDURE — 1160F RVW MEDS BY RX/DR IN RCRD: CPT | Performed by: FAMILY MEDICINE

## 2025-05-15 PROCEDURE — 1036F TOBACCO NON-USER: CPT | Performed by: FAMILY MEDICINE

## 2025-05-15 PROCEDURE — 3017F COLORECTAL CA SCREEN DOC REV: CPT | Performed by: FAMILY MEDICINE

## 2025-05-15 RX ORDER — AMLODIPINE BESYLATE 10 MG/1
10 TABLET ORAL DAILY
Qty: 90 TABLET | Refills: 3 | Status: SHIPPED | OUTPATIENT
Start: 2025-05-15

## 2025-05-15 RX ORDER — HYDROXYZINE HYDROCHLORIDE 10 MG/1
10 TABLET, FILM COATED ORAL
COMMUNITY

## 2025-05-15 RX ORDER — LOSARTAN POTASSIUM 25 MG/1
25 TABLET ORAL DAILY
Qty: 30 TABLET | Refills: 1 | Status: SHIPPED | OUTPATIENT
Start: 2025-05-15

## 2025-05-15 ASSESSMENT — ENCOUNTER SYMPTOMS
WHEEZING: 0
SHORTNESS OF BREATH: 0

## 2025-05-15 NOTE — PROGRESS NOTES
[facility]  [address]  [facility] [address]  Dept: [phone]  Dept Fax: [phone]  Loc: [phone]  PROGRESS NOTE      Visit Date: 5/15/2025    Sami Green is a 74 y.o. male who presents today for:  Chief Complaint   Patient presents with    Hypertension       Chief complaint:  HTN    Subjective:  Hypertension  Pertinent negatives include no chest pain or shortness of breath.       4 week f/u  HTN.  Norvasc was increased to 10 mg at last appt. Elevated BPs at home.  No leg swelling.    Leaves for [place] in 2 days and will be there a few days.     Review of Systems   Constitutional:  Positive for fatigue.   Respiratory:  Negative for shortness of breath and wheezing.    Cardiovascular:  Negative for chest pain and leg swelling.   Neurological:  Negative for dizziness and light-headedness.     Patient Active Problem List   Diagnosis    Benign prostatic hyperplasia with nocturia    Mixed hyperlipidemia    Psoriasis    Pre-diabetes    Primary hypertension    Dizziness    Benign paroxysmal positional vertigo     Past Medical History:   Diagnosis Date    BPH (benign prostatic hyperplasia)     COVID-19 11/12/2020    Hepatitis B     Hyperlipidemia     Hypertension 06/08/2022    Neuropathy 2021    Psoriasis       Past Surgical History:   Procedure Laterality Date    BACK SURGERY  2015    Herniated disk     MOUTH SURGERY  1968     Family History   Problem Relation Age of Onset    High Cholesterol Mother     Emphysema Father     Asthma Father     Cancer Sister     Cancer Brother      Social History     Tobacco Use    Smoking status: Never    Smokeless tobacco: Never    Tobacco comments:     As a child my parents were heavy smokers   Substance Use Topics    Alcohol use: Never      Current Outpatient Medications   Medication Sig Dispense Refill    hydrOXYzine HCl (ATARAX) 10 MG tablet Take 1 tablet by mouth nightly as needed for Anxiety

## 2025-05-30 ENCOUNTER — HOSPITAL ENCOUNTER (OUTPATIENT)
Age: 75
Discharge: HOME | End: 2025-05-30
Payer: MEDICARE

## 2025-05-30 DIAGNOSIS — I10 PRIMARY HYPERTENSION: ICD-10-CM

## 2025-05-30 LAB
ANION GAP SERPL CALC-SCNC: 11 MEQ/L (ref 8–16)
BUN SERPL-MCNC: 25 MG/DL (ref 8–23)
CALCIUM SERPL-MCNC: 9.3 MG/DL (ref 8.8–10.2)
CHLORIDE SERPL-SCNC: 104 MEQ/L (ref 98–111)
CO2 SERPL-SCNC: 27 MEQ/L (ref 22–29)
CREAT SERPL-MCNC: 1.3 MG/DL (ref 0.7–1.2)
GFR SERPL CREATININE-BSD FRML MDRD: 57 ML/MIN/1.73M2
GLUCOSE SERPL-MCNC: 112 MG/DL (ref 74–109)
POTASSIUM SERPL-SCNC: 4.1 MEQ/L (ref 3.5–5.2)
SODIUM SERPL-SCNC: 142 MEQ/L (ref 135–145)

## 2025-05-30 PROCEDURE — 36415 COLL VENOUS BLD VENIPUNCTURE: CPT

## 2025-05-30 PROCEDURE — 80048 BASIC METABOLIC PNL TOTAL CA: CPT

## 2025-05-31 ENCOUNTER — RESULTS FOLLOW-UP (OUTPATIENT)
Dept: FAMILY MEDICINE CLINIC | Age: 75
End: 2025-05-31

## 2025-06-12 ENCOUNTER — OFFICE VISIT (OUTPATIENT)
Dept: FAMILY MEDICINE CLINIC | Age: 75
End: 2025-06-12

## 2025-06-12 VITALS
BODY MASS INDEX: 26.66 KG/M2 | DIASTOLIC BLOOD PRESSURE: 66 MMHG | WEIGHT: 180 LBS | OXYGEN SATURATION: 95 % | HEIGHT: 69 IN | HEART RATE: 59 BPM | SYSTOLIC BLOOD PRESSURE: 130 MMHG

## 2025-06-12 DIAGNOSIS — I10 PRIMARY HYPERTENSION: ICD-10-CM

## 2025-06-12 RX ORDER — LOSARTAN POTASSIUM 25 MG/1
25 TABLET ORAL DAILY
Qty: 90 TABLET | Refills: 3 | Status: SHIPPED | OUTPATIENT
Start: 2025-06-12

## 2025-06-12 NOTE — PROGRESS NOTES
[rosuvastatin calcium] and benadryl [diphenhydramine].    Past Medical History  Gallardo  has a past medical history of BPH (benign prostatic hyperplasia), COVID-19, Hepatitis B, Hyperlipidemia, Hypertension, Neuropathy, and Psoriasis.    Past Surgical History  The patient  has a past surgical history that includes back surgery (2015) and Mouth surgery (1968).    Family History  This patient's family history includes Asthma in his father; Cancer in his brother and sister; Emphysema in his father; High Cholesterol in his mother.    Social History  Gallardo  reports that he has never smoked. He has never used smokeless tobacco. He reports that he does not drink alcohol and does not use drugs.    Health Maintenance:    Health maintenance reviewed.   Health Maintenance   Topic Date Due    COVID-19 Vaccine (1 - 2024-25 season) Never done    A1C test (Diabetic or Prediabetic)  09/11/2025    Annual Wellness Visit (Medicare)  09/12/2025    Depression Screen  03/10/2026    Colorectal Cancer Screen  04/09/2026    Lipids  04/22/2026    DTaP/Tdap/Td vaccine (2 - Td or Tdap) 09/03/2030    Flu vaccine  Completed    Shingles vaccine  Completed    Pneumococcal 50+ years Vaccine  Completed    Respiratory Syncytial Virus (RSV) Pregnant or age 60 yrs+  Completed    Hepatitis C screen  Completed    Hepatitis A vaccine  Aged Out    Hepatitis B vaccine  Aged Out    Hib vaccine  Aged Out    Polio vaccine  Aged Out    Meningococcal (ACWY) vaccine  Aged Out    Meningococcal B vaccine  Aged Out    Prostate Specific Antigen (PSA) Screening or Monitoring  Discontinued       Objective:      /66 (BP Site: Left Upper Arm, Patient Position: Sitting)   Pulse 59   Ht 1.753 m (5' 9\")   Wt 81.6 kg (180 lb)   SpO2 95%   BMI 26.58 kg/m²     Physical Exam  Vitals reviewed.   Constitutional:       Appearance: He is well-developed. He is not ill-appearing.   Cardiovascular:      Rate and Rhythm: Normal rate and regular rhythm.      Heart sounds: No

## 2025-07-21 ENCOUNTER — TELEPHONE (OUTPATIENT)
Dept: CARDIOLOGY CLINIC | Age: 75
End: 2025-07-21

## 2025-07-27 ENCOUNTER — HOSPITAL ENCOUNTER (INPATIENT)
Age: 75
LOS: 1 days | Discharge: HOME | DRG: 661 | End: 2025-07-28
Attending: EMERGENCY MEDICINE | Admitting: INTERNAL MEDICINE
Payer: MEDICARE

## 2025-07-27 ENCOUNTER — APPOINTMENT (OUTPATIENT)
Dept: CT IMAGING | Age: 75
DRG: 661 | End: 2025-07-27
Payer: MEDICARE

## 2025-07-27 DIAGNOSIS — N20.0 KIDNEY STONE ON RIGHT SIDE: ICD-10-CM

## 2025-07-27 DIAGNOSIS — N18.9 ACUTE KIDNEY INJURY SUPERIMPOSED ON CKD: ICD-10-CM

## 2025-07-27 DIAGNOSIS — N23 RENAL COLIC: ICD-10-CM

## 2025-07-27 DIAGNOSIS — N20.1 RIGHT URETERAL CALCULUS: Primary | ICD-10-CM

## 2025-07-27 DIAGNOSIS — N17.9 ACUTE KIDNEY INJURY SUPERIMPOSED ON CKD: ICD-10-CM

## 2025-07-27 LAB
ALBUMIN SERPL BCG-MCNC: 4.8 G/DL (ref 3.4–4.9)
ALP SERPL-CCNC: 126 U/L (ref 40–129)
ALT SERPL W/O P-5'-P-CCNC: 27 U/L (ref 10–50)
ANION GAP SERPL CALC-SCNC: 17 MEQ/L (ref 8–16)
AST SERPL-CCNC: 21 U/L (ref 10–50)
BASOPHILS ABSOLUTE: 0.1 THOU/MM3 (ref 0–0.1)
BASOPHILS NFR BLD AUTO: 0.8 %
BILIRUB CONJ SERPL-MCNC: < 0.1 MG/DL (ref 0–0.2)
BILIRUB SERPL-MCNC: 0.3 MG/DL (ref 0.3–1.2)
BILIRUB UR QL STRIP.AUTO: NEGATIVE
BUN SERPL-MCNC: 34 MG/DL (ref 8–23)
CALCIUM SERPL-MCNC: 10.5 MG/DL (ref 8.8–10.2)
CHARACTER UR: CLEAR
CHLORIDE SERPL-SCNC: 102 MEQ/L (ref 98–111)
CO2 SERPL-SCNC: 22 MEQ/L (ref 22–29)
COLOR, UA: YELLOW
CREAT SERPL-MCNC: 1.8 MG/DL (ref 0.7–1.2)
DEPRECATED RDW RBC AUTO: 40.8 FL (ref 35–45)
EKG ATRIAL RATE: 70 BPM
EKG P AXIS: 55 DEGREES
EKG P-R INTERVAL: 162 MS
EKG Q-T INTERVAL: 392 MS
EKG QRS DURATION: 88 MS
EKG QTC CALCULATION (BAZETT): 423 MS
EKG R AXIS: 39 DEGREES
EKG T AXIS: 117 DEGREES
EKG VENTRICULAR RATE: 70 BPM
EOSINOPHIL NFR BLD AUTO: 1.3 %
EOSINOPHILS ABSOLUTE: 0.1 THOU/MM3 (ref 0–0.4)
ERYTHROCYTE [DISTWIDTH] IN BLOOD BY AUTOMATED COUNT: 12.5 % (ref 11.5–14.5)
GFR SERPL CREATININE-BSD FRML MDRD: 39 ML/MIN/1.73M2
GLUCOSE SERPL-MCNC: 177 MG/DL (ref 74–109)
GLUCOSE UR QL STRIP.AUTO: NEGATIVE MG/DL
HCT VFR BLD AUTO: 43.2 % (ref 42–52)
HGB BLD-MCNC: 14.5 GM/DL (ref 14–18)
HGB UR QL STRIP.AUTO: NEGATIVE
IMM GRANULOCYTES # BLD AUTO: 0.02 THOU/MM3 (ref 0–0.07)
IMM GRANULOCYTES NFR BLD AUTO: 0.3 %
KETONES UR QL STRIP.AUTO: NEGATIVE
LACTIC ACID, SEPSIS: 2.1 MMOL/L (ref 0.5–1.9)
LACTIC ACID, SEPSIS: 2.6 MMOL/L (ref 0.5–1.9)
LIPASE SERPL-CCNC: 27 U/L (ref 13–60)
LYMPHOCYTES ABSOLUTE: 1.7 THOU/MM3 (ref 1–4.8)
LYMPHOCYTES NFR BLD AUTO: 23.1 %
MCH RBC QN AUTO: 29.8 PG (ref 26–33)
MCHC RBC AUTO-ENTMCNC: 33.6 GM/DL (ref 32.2–35.5)
MCV RBC AUTO: 88.7 FL (ref 80–94)
MONOCYTES ABSOLUTE: 0.3 THOU/MM3 (ref 0.4–1.3)
MONOCYTES NFR BLD AUTO: 4.3 %
NEUTROPHILS ABSOLUTE: 5.3 THOU/MM3 (ref 1.8–7.7)
NEUTROPHILS NFR BLD AUTO: 70.2 %
NITRITE UR QL STRIP: NEGATIVE
NRBC BLD AUTO-RTO: 0 /100 WBC
NT-PROBNP SERPL IA-MCNC: 113 PG/ML (ref 0–449)
OSMOLALITY SERPL CALC.SUM OF ELEC: 293.2 MOSMOL/KG (ref 275–300)
PH UR STRIP.AUTO: 7 [PH] (ref 5–9)
PLATELET # BLD AUTO: 174 THOU/MM3 (ref 130–400)
PMV BLD AUTO: 11.8 FL (ref 9.4–12.4)
POTASSIUM SERPL-SCNC: 4.1 MEQ/L (ref 3.5–5.2)
PROT SERPL-MCNC: 7.6 G/DL (ref 6.4–8.3)
PROT UR STRIP.AUTO-MCNC: NEGATIVE MG/DL
RBC # BLD AUTO: 4.87 MILL/MM3 (ref 4.7–6.1)
SODIUM SERPL-SCNC: 141 MEQ/L (ref 135–145)
SP GR UR REFRACT.AUTO: > 1.03 (ref 1–1.03)
TROPONIN, HIGH SENSITIVITY: 10 NG/L (ref 0–12)
UROBILINOGEN, URINE: 1 EU/DL (ref 0–1)
WBC # BLD AUTO: 7.5 THOU/MM3 (ref 4.8–10.8)
WBC #/AREA URNS HPF: NEGATIVE /[HPF]

## 2025-07-27 PROCEDURE — 96375 TX/PRO/DX INJ NEW DRUG ADDON: CPT

## 2025-07-27 PROCEDURE — 2580000003 HC RX 258: Performed by: INTERNAL MEDICINE

## 2025-07-27 PROCEDURE — 6360000002 HC RX W HCPCS: Performed by: EMERGENCY MEDICINE

## 2025-07-27 PROCEDURE — 6370000000 HC RX 637 (ALT 250 FOR IP)

## 2025-07-27 PROCEDURE — 83605 ASSAY OF LACTIC ACID: CPT

## 2025-07-27 PROCEDURE — 81003 URINALYSIS AUTO W/O SCOPE: CPT

## 2025-07-27 PROCEDURE — 84484 ASSAY OF TROPONIN QUANT: CPT

## 2025-07-27 PROCEDURE — 1200000000 HC SEMI PRIVATE

## 2025-07-27 PROCEDURE — 36415 COLL VENOUS BLD VENIPUNCTURE: CPT

## 2025-07-27 PROCEDURE — 6360000004 HC RX CONTRAST MEDICATION: Performed by: EMERGENCY MEDICINE

## 2025-07-27 PROCEDURE — 80053 COMPREHEN METABOLIC PANEL: CPT

## 2025-07-27 PROCEDURE — 85025 COMPLETE CBC W/AUTO DIFF WBC: CPT

## 2025-07-27 PROCEDURE — 2580000003 HC RX 258: Performed by: EMERGENCY MEDICINE

## 2025-07-27 PROCEDURE — 99222 1ST HOSP IP/OBS MODERATE 55: CPT

## 2025-07-27 PROCEDURE — 83880 ASSAY OF NATRIURETIC PEPTIDE: CPT

## 2025-07-27 PROCEDURE — 74174 CTA ABD&PLVS W/CONTRAST: CPT

## 2025-07-27 PROCEDURE — 71275 CT ANGIOGRAPHY CHEST: CPT

## 2025-07-27 PROCEDURE — 99285 EMERGENCY DEPT VISIT HI MDM: CPT

## 2025-07-27 PROCEDURE — 82248 BILIRUBIN DIRECT: CPT

## 2025-07-27 PROCEDURE — 83690 ASSAY OF LIPASE: CPT

## 2025-07-27 PROCEDURE — 6370000000 HC RX 637 (ALT 250 FOR IP): Performed by: INTERNAL MEDICINE

## 2025-07-27 PROCEDURE — 96374 THER/PROPH/DIAG INJ IV PUSH: CPT

## 2025-07-27 PROCEDURE — 6360000002 HC RX W HCPCS

## 2025-07-27 PROCEDURE — 93010 ELECTROCARDIOGRAM REPORT: CPT | Performed by: INTERNAL MEDICINE

## 2025-07-27 PROCEDURE — 93005 ELECTROCARDIOGRAM TRACING: CPT | Performed by: EMERGENCY MEDICINE

## 2025-07-27 PROCEDURE — 96376 TX/PRO/DX INJ SAME DRUG ADON: CPT

## 2025-07-27 RX ORDER — ONDANSETRON 2 MG/ML
4 INJECTION INTRAMUSCULAR; INTRAVENOUS EVERY 6 HOURS PRN
Status: DISCONTINUED | OUTPATIENT
Start: 2025-07-27 | End: 2025-07-28 | Stop reason: HOSPADM

## 2025-07-27 RX ORDER — SODIUM CHLORIDE 9 MG/ML
INJECTION, SOLUTION INTRAVENOUS CONTINUOUS
Status: DISCONTINUED | OUTPATIENT
Start: 2025-07-27 | End: 2025-07-28 | Stop reason: HOSPADM

## 2025-07-27 RX ORDER — AMLODIPINE BESYLATE 10 MG/1
10 TABLET ORAL DAILY
Status: DISCONTINUED | OUTPATIENT
Start: 2025-07-27 | End: 2025-07-28 | Stop reason: HOSPADM

## 2025-07-27 RX ORDER — FAMOTIDINE 20 MG/1
20 TABLET, FILM COATED ORAL 2 TIMES DAILY
Status: DISCONTINUED | OUTPATIENT
Start: 2025-07-27 | End: 2025-07-27 | Stop reason: DRUGHIGH

## 2025-07-27 RX ORDER — IOPAMIDOL 755 MG/ML
80 INJECTION, SOLUTION INTRAVASCULAR
Status: COMPLETED | OUTPATIENT
Start: 2025-07-27 | End: 2025-07-27

## 2025-07-27 RX ORDER — SODIUM CHLORIDE 9 MG/ML
INJECTION, SOLUTION INTRAVENOUS PRN
Status: DISCONTINUED | OUTPATIENT
Start: 2025-07-27 | End: 2025-07-28 | Stop reason: HOSPADM

## 2025-07-27 RX ORDER — FAMOTIDINE 20 MG/1
20 TABLET, FILM COATED ORAL DAILY
Status: DISCONTINUED | OUTPATIENT
Start: 2025-07-27 | End: 2025-07-28 | Stop reason: HOSPADM

## 2025-07-27 RX ORDER — TAMSULOSIN HYDROCHLORIDE 0.4 MG/1
0.4 CAPSULE ORAL DAILY
Status: DISCONTINUED | OUTPATIENT
Start: 2025-07-27 | End: 2025-07-28 | Stop reason: HOSPADM

## 2025-07-27 RX ORDER — OXYCODONE HYDROCHLORIDE 5 MG/1
5 TABLET ORAL EVERY 4 HOURS PRN
Status: DISCONTINUED | OUTPATIENT
Start: 2025-07-27 | End: 2025-07-28 | Stop reason: HOSPADM

## 2025-07-27 RX ORDER — ONDANSETRON 2 MG/ML
4 INJECTION INTRAMUSCULAR; INTRAVENOUS ONCE
Status: COMPLETED | OUTPATIENT
Start: 2025-07-27 | End: 2025-07-27

## 2025-07-27 RX ORDER — POLYETHYLENE GLYCOL 3350 17 G/17G
17 POWDER, FOR SOLUTION ORAL DAILY PRN
Status: DISCONTINUED | OUTPATIENT
Start: 2025-07-27 | End: 2025-07-28 | Stop reason: HOSPADM

## 2025-07-27 RX ORDER — 0.9 % SODIUM CHLORIDE 0.9 %
1000 INTRAVENOUS SOLUTION INTRAVENOUS ONCE
Status: COMPLETED | OUTPATIENT
Start: 2025-07-27 | End: 2025-07-27

## 2025-07-27 RX ORDER — SODIUM CHLORIDE 0.9 % (FLUSH) 0.9 %
5-40 SYRINGE (ML) INJECTION EVERY 12 HOURS SCHEDULED
Status: DISCONTINUED | OUTPATIENT
Start: 2025-07-27 | End: 2025-07-28 | Stop reason: HOSPADM

## 2025-07-27 RX ORDER — SODIUM CHLORIDE 0.9 % (FLUSH) 0.9 %
5-40 SYRINGE (ML) INJECTION PRN
Status: DISCONTINUED | OUTPATIENT
Start: 2025-07-27 | End: 2025-07-28 | Stop reason: HOSPADM

## 2025-07-27 RX ORDER — OXYCODONE HYDROCHLORIDE 5 MG/1
10 TABLET ORAL EVERY 4 HOURS PRN
Status: DISCONTINUED | OUTPATIENT
Start: 2025-07-27 | End: 2025-07-28 | Stop reason: HOSPADM

## 2025-07-27 RX ORDER — MORPHINE SULFATE 2 MG/ML
2 INJECTION, SOLUTION INTRAMUSCULAR; INTRAVENOUS
Status: DISCONTINUED | OUTPATIENT
Start: 2025-07-27 | End: 2025-07-28 | Stop reason: HOSPADM

## 2025-07-27 RX ORDER — ONDANSETRON 4 MG/1
4 TABLET, ORALLY DISINTEGRATING ORAL EVERY 8 HOURS PRN
Status: DISCONTINUED | OUTPATIENT
Start: 2025-07-27 | End: 2025-07-28 | Stop reason: HOSPADM

## 2025-07-27 RX ORDER — ATORVASTATIN CALCIUM 20 MG/1
20 TABLET, FILM COATED ORAL NIGHTLY
Status: DISCONTINUED | OUTPATIENT
Start: 2025-07-27 | End: 2025-07-28 | Stop reason: HOSPADM

## 2025-07-27 RX ORDER — CALCIUM CARBONATE 500 MG/1
500 TABLET, CHEWABLE ORAL 3 TIMES DAILY PRN
Status: DISCONTINUED | OUTPATIENT
Start: 2025-07-27 | End: 2025-07-28 | Stop reason: HOSPADM

## 2025-07-27 RX ADMIN — AMLODIPINE BESYLATE 10 MG: 10 TABLET ORAL at 17:10

## 2025-07-27 RX ADMIN — OXYCODONE 5 MG: 5 TABLET ORAL at 20:06

## 2025-07-27 RX ADMIN — ANTACID TABLETS 500 MG: 500 TABLET, CHEWABLE ORAL at 17:52

## 2025-07-27 RX ADMIN — FAMOTIDINE 20 MG: 20 TABLET, FILM COATED ORAL at 17:57

## 2025-07-27 RX ADMIN — HYDROMORPHONE HYDROCHLORIDE 1 MG: 1 INJECTION, SOLUTION INTRAMUSCULAR; INTRAVENOUS; SUBCUTANEOUS at 09:40

## 2025-07-27 RX ADMIN — IOPAMIDOL 80 ML: 755 INJECTION, SOLUTION INTRAVENOUS at 09:12

## 2025-07-27 RX ADMIN — MORPHINE SULFATE 2 MG: 2 INJECTION, SOLUTION INTRAMUSCULAR; INTRAVENOUS at 23:36

## 2025-07-27 RX ADMIN — HYDROMORPHONE HYDROCHLORIDE 1 MG: 1 INJECTION, SOLUTION INTRAMUSCULAR; INTRAVENOUS; SUBCUTANEOUS at 07:31

## 2025-07-27 RX ADMIN — ONDANSETRON 4 MG: 2 INJECTION, SOLUTION INTRAMUSCULAR; INTRAVENOUS at 07:31

## 2025-07-27 RX ADMIN — ATORVASTATIN CALCIUM 20 MG: 20 TABLET, FILM COATED ORAL at 20:07

## 2025-07-27 RX ADMIN — TAMSULOSIN HYDROCHLORIDE 0.4 MG: 0.4 CAPSULE ORAL at 13:58

## 2025-07-27 RX ADMIN — SODIUM CHLORIDE: 0.9 INJECTION, SOLUTION INTRAVENOUS at 22:04

## 2025-07-27 RX ADMIN — SODIUM CHLORIDE 1000 ML: 0.9 INJECTION, SOLUTION INTRAVENOUS at 07:30

## 2025-07-27 RX ADMIN — SODIUM CHLORIDE: 0.9 INJECTION, SOLUTION INTRAVENOUS at 12:38

## 2025-07-27 ASSESSMENT — PAIN DESCRIPTION - LOCATION
LOCATION: FLANK
LOCATION: FLANK
LOCATION: BACK

## 2025-07-27 ASSESSMENT — PAIN DESCRIPTION - ORIENTATION
ORIENTATION: RIGHT
ORIENTATION: LOWER
ORIENTATION: RIGHT

## 2025-07-27 ASSESSMENT — PAIN SCALES - GENERAL
PAINLEVEL_OUTOF10: 4
PAINLEVEL_OUTOF10: 4
PAINLEVEL_OUTOF10: 8
PAINLEVEL_OUTOF10: 0
PAINLEVEL_OUTOF10: 5
PAINLEVEL_OUTOF10: 4

## 2025-07-27 ASSESSMENT — PAIN - FUNCTIONAL ASSESSMENT
PAIN_FUNCTIONAL_ASSESSMENT: 0-10
PAIN_FUNCTIONAL_ASSESSMENT: ACTIVITIES ARE NOT PREVENTED
PAIN_FUNCTIONAL_ASSESSMENT: ACTIVITIES ARE NOT PREVENTED

## 2025-07-27 ASSESSMENT — PAIN DESCRIPTION - DESCRIPTORS
DESCRIPTORS: ACHING
DESCRIPTORS: ACHING

## 2025-07-27 NOTE — ED PROVIDER NOTES
[facility] ORTHOPEDICS 7K  EMERGENCY DEPARTMENT ENCOUNTER          Pt Name: Sami Green  MRN: [id number]  Birthdate 1950  Date of evaluation: 7/27/2025  Physician: Brit Herron MD  Supervising Attending Physician: Stacie Cope MD       CHIEF COMPLAINT       Chief Complaint   Patient presents with    Back Pain    Vomiting         HISTORY OF PRESENT ILLNESS    HPI  Sami Green is a 75 y.o. male with past medical history of cervical spine surgery, hyperlipidemia, hypertension, benign prostatic hyperplasia, neuropathy presented to our ED with chief complaint of sudden onset back pain.  Patient states that this morning at 4 AM he woke up with sudden onset low back pain that occasionally radiates to his lower abdomen.  At 7 AM he began having dry heaves with chills and sweats.  He had 1 episode of vomiting on his way to the hospital with his daughter.  Pain is constant on 8-9/10 scale and is hyperventilating.  Otherwise he denies fever, chest pain, urinary or bowel issues, recent weight loss, recent travel, recent sick contacts, diarrhea, constipation, body rash.  PAST MEDICAL AND SURGICAL HISTORY     Past Medical History:   Diagnosis Date    BPH (benign prostatic hyperplasia)     COVID-19 11/12/2020    Hepatitis B     Hyperlipidemia     Hypertension 06/08/2022    Neuropathy 2021    Psoriasis        Past Surgical History:   Procedure Laterality Date    BACK SURGERY  2015    Herniated disk     MOUTH SURGERY  1968         MEDICATIONS     Current Facility-Administered Medications:     morphine (PF) injection 2 mg, 2 mg, IntraVENous, Q2H PRN, Rayshawn Wellington MD    0.9 % sodium chloride infusion, , IntraVENous, Continuous, Stacie Cope MD, Last Rate: 100 mL/hr at 07/27/25 1238, New Bag at 07/27/25 1238    tamsulosin (FLOMAX) capsule 0.4 mg, 0.4 mg, Oral, Daily, Shaquille Huggins, APRN - CNP, 0.4 mg at 07/27/25 1358    Current Discharge Medication List        CONTINUE these medications which 
emergency.    Upon reassessment, patient still has moderate pain although he says pain seems much better.  Patient is more comfortable.      Given his age, only moderate pain improvement, and MEAGHAN on CKD, I feel admission for observation with urology consult and close renal function monitor is appropriate.      Case is discussed with and patient is admitted by Dr. Cope.     Hemodynamically stable at the time of admission.    Consultants:  10:53 AM EDT: Dr. Cope  10:55 AM EDT: Urology      Medications   morphine (PF) injection 2 mg (has no administration in time range)   sodium chloride 0.9 % bolus 1,000 mL (0 mLs IntraVENous Stopped 7/27/25 0938)   HYDROmorphone (DILAUDID) injection 1 mg (1 mg IntraVENous Given 7/27/25 0731)   ondansetron (ZOFRAN) injection 4 mg (4 mg IntraVENous Given 7/27/25 0731)   iopamidol (ISOVUE-370) 76 % injection 80 mL (80 mLs IntraVENous Given 7/27/25 0912)   HYDROmorphone (DILAUDID) injection 1 mg (1 mg IntraVENous Given 7/27/25 0940)       Vitals:    07/27/25 0721 07/27/25 0803 07/27/25 1041   BP: (!) 152/84 137/73 121/73   Pulse: 69 63 58   Resp: 24 18 13   Temp: 97.4 °F (36.3 °C)     TempSrc: Oral     SpO2: 100% 94% 96%   Weight: 81.6 kg (180 lb)     Height: 1.753 m (5' 9\")           FINAL IMPRESSION AND DISPOSITION     1. Right ureteral calculus    2. Acute kidney injury superimposed on CKD    3. Renal colic        DISPOSITION Admitted 07/27/2025 10:41:52 AM   DISPOSITION CONDITION Stable     OUTPATIENT FOLLOW UP THE PATIENT:  No follow-up provider specified.    DISCHARGE MEDICATIONS:(None if blank)  New Prescriptions    No medications on file       Rayshawn Wellington MD    Documentation Disclaimer  This document was created using Epic and Dragon dictation voice recognition software. As such, it may be subject to occasional errors in syntax or “sound-alike” word substitutions that could escape proofreading. While every effort has been made to review and edit the content, inaccuracies may still

## 2025-07-27 NOTE — ED NOTES
Pt to be transferred to [facility]. Spoke with floor staff, Marco, prior to transfer. Pt in stable condition.

## 2025-07-27 NOTE — ED NOTES
ED to inpatient nurses report      Chief Complaint:  Chief Complaint   Patient presents with    Back Pain    Vomiting     Present to ED from: home    MOA:     LOC: alert and orientated to name, place, date  Mobility: Requires assistance * 1  Oxygen Baseline: room air    Current needs required: 2L NC after dilaudid     Code Status:   Prior    What abnormal results were found and what did you give/do to treat them? Back pain, kidney stone  Any procedures or intervention occur? See MAR, admission    Mental Status:  Level of Consciousness: Alert (0)    Psych Assessment:        Vitals:  Patient Vitals for the past 24 hrs:   BP Temp Temp src Pulse Resp SpO2 Height Weight   07/27/25 1041 121/73 -- -- 58 13 96 % -- --   07/27/25 0803 137/73 -- -- 63 18 94 % -- --   07/27/25 0721 (!) 152/84 97.4 °F (36.3 °C) Oral 69 24 100 % 1.753 m (5' 9\") 81.6 kg (180 lb)        LDAs:   Peripheral IV 07/27/25 Left Antecubital (Active)       Ambulatory Status:  Presents to emergency department  because of falls (Syncope, seizure, or loss of consciousness): No, Age > 70: No, Altered Mental Status, Intoxication with alcohol or substance confusion (Disorientation, impaired judgment, poor safety awaremess, or inability to follow instructions): No, Impaired Mobility: Ambulates or transfers with assistive devices or assistance; Unable to ambulate or transer.: No, Nursing Judgement: No    Diagnosis:  DISPOSITION Admitted 07/27/2025 10:41:52 AM   Final diagnoses:   None        Consults:  IP CONSULT TO UROLOGY     Pain Score:  Pain Assessment  Pain Assessment: 0-10  Pain Level: 4  Patient's Stated Pain Goal: 4  Pain Location: Back  Pain Orientation: Lower    C-SSRS:        Sepsis Screening:       Carlos Fall Risk:  [place] 1 Fall Risk  Presents to emergency department  because of falls (Syncope, seizure, or loss of consciousness): No  Age > 70: No  Altered Mental Status, Intoxication with alcohol or substance confusion (Disorientation, impaired

## 2025-07-27 NOTE — CONSULTS
[facility]  [facility]  [address] [facility] [address]  Dept: [phone]  Loc: [phone]  Visit Date: 7/27/2025    Urology Consult Note    Reason for Consult:  kidney stone  Requesting Physician:  ED    History Obtained From:  patient, electronic medical record    Chief Complaint: R flank pain    HISTORY OF PRESENT ILLNESS:                The patient is a 75 y.o. male with significant past medical history of BPH, HTN, HLD who presents with R flank pain this AM. Associated with nausea and vomiting. CT showing 3 mm right ureteral stone. No urine studies complete. No hx of stones, has strong family hx of stones. Pain improved following Dilaudid.    Follows with Jojo for BPH on Flomax, elevated PSA (3.77)    Past Medical History:        Diagnosis Date    BPH (benign prostatic hyperplasia)     COVID-19 11/12/2020    Hepatitis B     Hyperlipidemia     Hypertension 06/08/2022    Neuropathy 2021    Psoriasis      Past Surgical History:        Procedure Laterality Date    BACK SURGERY  2015    Herniated disk     MOUTH SURGERY  1968     Allergies:  Crestor [rosuvastatin calcium] and Benadryl [diphenhydramine]  Social History:  Social History     Socioeconomic History    Marital status:      Spouse name: Not on file    Number of children: Not on file    Years of education: Not on file    Highest education level: Not on file   Occupational History    Not on file   Tobacco Use    Smoking status: Never    Smokeless tobacco: Never    Tobacco comments:     As a child my parents were heavy smokers   Substance and Sexual Activity    Alcohol use: Never    Drug use: No    Sexual activity: Not Currently     Partners: Female   Other Topics Concern    Not on file   Social History Narrative    Not on file     Social Drivers of Health     Financial Resource Strain: Low Risk  (10/7/2024)    Overall Financial Resource Strain (CARDIA)     Difficulty of Paying Living Expenses: Not

## 2025-07-27 NOTE — H&P
[diphenhydramine]    Social History:   TOBACCO:   reports that he has never smoked. He has never used smokeless tobacco.  ETOH:   reports no history of alcohol use.      Family History:       Problem Relation Age of Onset    High Cholesterol Mother     Emphysema Father     Asthma Father     Cancer Sister     Cancer Brother        REVIEW OF SYSTEMS:  CONSTITUTIONAL:  positive for  fatigue and malaise  negative for  fevers and chills  EYES:  negative for  irritation, redness, and icterus  HEENT:  negative for  hearing loss and tinnitus  RESPIRATORY:  negative for  dry cough, cough with sputum, and dyspnea  CARDIOVASCULAR:  negative for  chest pain, dyspnea, palpitations  GASTROINTESTINAL:  positive for abdominal pain  negative for nausea and vomiting  GENITOURINARY:  positive for rt renal colic  negative for frequency, dysuria, and hematuria  ALLERGIC/IMMUNOLOGIC:  negative  ENDOCRINE:  negative for heat intolerance and cold intolerance  MUSCULOSKELETAL:  negative  NEUROLOGICAL:  negative for headaches, dizziness, and seizures  BEHAVIOR/PSYCH:  negative for increased agitation and anxiety    Physical Exam:    Vitals: /66   Pulse 55   Temp 97.6 °F (36.4 °C) (Oral)   Resp 16   Ht 1.753 m (5' 9\")   Wt 81.6 kg (180 lb)   SpO2 92%   BMI 26.58 kg/m²   CONSTITUTIONAL:  awake, alert, cooperative, no apparent distress, and appears stated age  EYES:  extra-ocular muscles intact  ENT:  normocepalic, without obvious abnormality  NECK:  supple, symmetrical, trachea midline  HEMATOLOGIC/LYMPHATICS:  no cervical lymphadenopathy and no supraclavicular lymphadenopathy  BACK:  symmetric  LUNGS:  no increased work of breathing and clear to auscultation  CARDIOVASCULAR:  normal S1 and S2 and no edema  ABDOMEN:  normal bowel sounds, soft, non-distended, non-tender, involuntary guarding absent, and no hepatosplenomegally  MUSCULOSKELETAL:  there is no redness, warmth, or swelling of the joints  NEUROLOGIC:  Awake, alert,

## 2025-07-27 NOTE — ED NOTES
Presents to ED from home accompanied by daughter with complaints of sudden right lower back pain that woke him up at 0430 this morning. Pt reports he then began dry heaving and had an episode of emesis in route to ED. Upon arrival pt anxious and hyperventilating, rating pain 9/10 in severity. Dr. Wellington at bedside for assessment.

## 2025-07-27 NOTE — PLAN OF CARE
Problem: Discharge Planning  Goal: Discharge to home or other facility with appropriate resources  Outcome: Progressing  Flowsheets (Taken 7/27/2025 1713)  Discharge to home or other facility with appropriate resources: Identify barriers to discharge with patient and caregiver     Problem: Safety - Adult  Goal: Free from fall injury  Outcome: Progressing  Flowsheets (Taken 7/27/2025 1713)  Free From Fall Injury: Instruct family/caregiver on patient safety   Care plan reviewed with patient.  Patient verbalize understanding of the plan of care and contribute to goal setting.

## 2025-07-27 NOTE — PROGRESS NOTES
Pt admitted to  7K18 in a wheelchair and from ED.     Complaints: right flank pain.      IV none infusing into the antecubital left, condition patent and no redness at a rate of 0 mls/ hour with about 0 mls in the bag still. IV site free of s/s of infection or infiltration. Vital signs obtained. Assessment and data collection initiated.     Two nurse skin assessment performed by Anahi Kenyon RN and Olya Mohr RN. Oriented to room.     Explained patients right to have family, representative or physician notified of their admission.  Patient has Declined for physician to be notified.  Patient has Declined for family/representative to be notified.    The patient is interested in [facility]’s meds to beds program?:  Yes    Policies and procedures for  explained. All questions answered with no further questions at this time. Fall prevention and safety brochure discussed with patient.  Bed alarm on. Call light in reach.

## 2025-07-27 NOTE — PROGRESS NOTES
Pharmacy Renal Adjustment    Pharmacy renally adjusted the following medication(s) per P&T approved policy: Pepcid    Recent Labs     07/27/25 0725   BUN 34*   CREATININE 1.8*     eGFR:   Recent Labs     07/27/25 0725   LABGLOM 39*     Estimated Creatinine Clearance: 35 mL/min (A) (based on SCr of 1.8 mg/dL (H)).    Assessment:  MEAGHAN    Plan:   Decrease Pepcid from 20 mg BID to 20 mg daily    Please call pharmacy with any questions.    Clay Ferrari, ChrisD, BCPS  7/27/2025  5:21 PM

## 2025-07-28 ENCOUNTER — ANESTHESIA (OUTPATIENT)
Dept: OPERATING ROOM | Age: 75
DRG: 661 | End: 2025-07-28
Payer: MEDICARE

## 2025-07-28 ENCOUNTER — ANESTHESIA EVENT (OUTPATIENT)
Dept: OPERATING ROOM | Age: 75
DRG: 661 | End: 2025-07-28
Payer: MEDICARE

## 2025-07-28 VITALS
WEIGHT: 180 LBS | HEART RATE: 71 BPM | TEMPERATURE: 97.3 F | DIASTOLIC BLOOD PRESSURE: 71 MMHG | RESPIRATION RATE: 18 BRPM | BODY MASS INDEX: 26.66 KG/M2 | HEIGHT: 69 IN | SYSTOLIC BLOOD PRESSURE: 133 MMHG | OXYGEN SATURATION: 93 %

## 2025-07-28 PROBLEM — N23 RENAL COLIC ON RIGHT SIDE: Status: ACTIVE | Noted: 2025-07-28

## 2025-07-28 PROBLEM — N17.9 AKI (ACUTE KIDNEY INJURY): Status: ACTIVE | Noted: 2025-07-28

## 2025-07-28 LAB
ANION GAP SERPL CALC-SCNC: 11 MEQ/L (ref 8–16)
BUN SERPL-MCNC: 22 MG/DL (ref 8–23)
CALCIUM SERPL-MCNC: 8.6 MG/DL (ref 8.8–10.2)
CHLORIDE SERPL-SCNC: 108 MEQ/L (ref 98–111)
CO2 SERPL-SCNC: 23 MEQ/L (ref 22–29)
CREAT SERPL-MCNC: 1.3 MG/DL (ref 0.7–1.2)
DEPRECATED RDW RBC AUTO: 43 FL (ref 35–45)
ERYTHROCYTE [DISTWIDTH] IN BLOOD BY AUTOMATED COUNT: 12.6 % (ref 11.5–14.5)
GFR SERPL CREATININE-BSD FRML MDRD: 57 ML/MIN/1.73M2
GLUCOSE SERPL-MCNC: 98 MG/DL (ref 74–109)
HCT VFR BLD AUTO: 36.5 % (ref 42–52)
HGB BLD-MCNC: 11.8 GM/DL (ref 14–18)
MCH RBC QN AUTO: 29.9 PG (ref 26–33)
MCHC RBC AUTO-ENTMCNC: 32.3 GM/DL (ref 32.2–35.5)
MCV RBC AUTO: 92.6 FL (ref 80–94)
PLATELET # BLD AUTO: 133 THOU/MM3 (ref 130–400)
PMV BLD AUTO: 11.9 FL (ref 9.4–12.4)
POTASSIUM SERPL-SCNC: 4.2 MEQ/L (ref 3.5–5.2)
RBC # BLD AUTO: 3.94 MILL/MM3 (ref 4.7–6.1)
SODIUM SERPL-SCNC: 142 MEQ/L (ref 135–145)
WBC # BLD AUTO: 5.9 THOU/MM3 (ref 4.8–10.8)

## 2025-07-28 PROCEDURE — 7100000001 HC PACU RECOVERY - ADDTL 15 MIN: Performed by: UROLOGY

## 2025-07-28 PROCEDURE — 2580000003 HC RX 258: Performed by: INTERNAL MEDICINE

## 2025-07-28 PROCEDURE — 7100000000 HC PACU RECOVERY - FIRST 15 MIN: Performed by: UROLOGY

## 2025-07-28 PROCEDURE — BT1D1ZZ FLUOROSCOPY OF RIGHT KIDNEY, URETER AND BLADDER USING LOW OSMOLAR CONTRAST: ICD-10-PCS | Performed by: UROLOGY

## 2025-07-28 PROCEDURE — 6370000000 HC RX 637 (ALT 250 FOR IP): Performed by: INTERNAL MEDICINE

## 2025-07-28 PROCEDURE — 6360000002 HC RX W HCPCS: Performed by: NURSE ANESTHETIST, CERTIFIED REGISTERED

## 2025-07-28 PROCEDURE — C2617 STENT, NON-COR, TEM W/O DEL: HCPCS | Performed by: UROLOGY

## 2025-07-28 PROCEDURE — 0TC68ZZ EXTIRPATION OF MATTER FROM RIGHT URETER, VIA NATURAL OR ARTIFICIAL OPENING ENDOSCOPIC: ICD-10-PCS | Performed by: UROLOGY

## 2025-07-28 PROCEDURE — G0378 HOSPITAL OBSERVATION PER HR: HCPCS

## 2025-07-28 PROCEDURE — 3600000012 HC SURGERY LEVEL 2 ADDTL 15MIN: Performed by: UROLOGY

## 2025-07-28 PROCEDURE — 3600000002 HC SURGERY LEVEL 2 BASE: Performed by: UROLOGY

## 2025-07-28 PROCEDURE — 0T768DZ DILATION OF RIGHT URETER WITH INTRALUMINAL DEVICE, VIA NATURAL OR ARTIFICIAL OPENING ENDOSCOPIC: ICD-10-PCS | Performed by: UROLOGY

## 2025-07-28 PROCEDURE — 2709999900 HC NON-CHARGEABLE SUPPLY: Performed by: UROLOGY

## 2025-07-28 PROCEDURE — 3700000000 HC ANESTHESIA ATTENDED CARE: Performed by: UROLOGY

## 2025-07-28 PROCEDURE — 36415 COLL VENOUS BLD VENIPUNCTURE: CPT

## 2025-07-28 PROCEDURE — 85027 COMPLETE CBC AUTOMATED: CPT

## 2025-07-28 PROCEDURE — C1758 CATHETER, URETERAL: HCPCS | Performed by: UROLOGY

## 2025-07-28 PROCEDURE — 2500000003 HC RX 250 WO HCPCS: Performed by: NURSE ANESTHETIST, CERTIFIED REGISTERED

## 2025-07-28 PROCEDURE — 99231 SBSQ HOSP IP/OBS SF/LOW 25: CPT | Performed by: UROLOGY

## 2025-07-28 PROCEDURE — 2720000010 HC SURG SUPPLY STERILE: Performed by: UROLOGY

## 2025-07-28 PROCEDURE — 3700000001 HC ADD 15 MINUTES (ANESTHESIA): Performed by: UROLOGY

## 2025-07-28 PROCEDURE — 82365 CALCULUS SPECTROSCOPY: CPT

## 2025-07-28 PROCEDURE — C1747 HC ENDOSCOPE, SINGLE, URINARY TRACT: HCPCS | Performed by: UROLOGY

## 2025-07-28 PROCEDURE — 80048 BASIC METABOLIC PNL TOTAL CA: CPT

## 2025-07-28 PROCEDURE — C1769 GUIDE WIRE: HCPCS | Performed by: UROLOGY

## 2025-07-28 PROCEDURE — 6370000000 HC RX 637 (ALT 250 FOR IP)

## 2025-07-28 DEVICE — URETERAL STENT
Type: IMPLANTABLE DEVICE | Status: FUNCTIONAL
Brand: PERCUFLEX™ PLUS

## 2025-07-28 RX ORDER — EPHEDRINE SULFATE/0.9% NACL/PF 25 MG/5 ML
SYRINGE (ML) INTRAVENOUS
Status: DISCONTINUED | OUTPATIENT
Start: 2025-07-28 | End: 2025-07-28 | Stop reason: SDUPTHER

## 2025-07-28 RX ORDER — PHENAZOPYRIDINE HYDROCHLORIDE 100 MG/1
100 TABLET, FILM COATED ORAL 3 TIMES DAILY PRN
Qty: 15 TABLET | Refills: 0 | Status: SHIPPED | OUTPATIENT
Start: 2025-07-28 | End: 2025-08-02

## 2025-07-28 RX ORDER — FENTANYL CITRATE 50 UG/ML
INJECTION, SOLUTION INTRAMUSCULAR; INTRAVENOUS
Status: DISCONTINUED | OUTPATIENT
Start: 2025-07-28 | End: 2025-07-28 | Stop reason: SDUPTHER

## 2025-07-28 RX ORDER — CEFAZOLIN SODIUM 1 G/3ML
INJECTION, POWDER, FOR SOLUTION INTRAMUSCULAR; INTRAVENOUS
Status: DISCONTINUED | OUTPATIENT
Start: 2025-07-28 | End: 2025-07-28 | Stop reason: SDUPTHER

## 2025-07-28 RX ORDER — DEXAMETHASONE SODIUM PHOSPHATE 4 MG/ML
INJECTION, SOLUTION INTRA-ARTICULAR; INTRALESIONAL; INTRAMUSCULAR; INTRAVENOUS; SOFT TISSUE
Status: DISCONTINUED | OUTPATIENT
Start: 2025-07-28 | End: 2025-07-28 | Stop reason: SDUPTHER

## 2025-07-28 RX ORDER — LIDOCAINE HCL/PF 100 MG/5ML
SYRINGE (ML) INJECTION
Status: DISCONTINUED | OUTPATIENT
Start: 2025-07-28 | End: 2025-07-28 | Stop reason: SDUPTHER

## 2025-07-28 RX ORDER — KETOROLAC TROMETHAMINE 10 MG/1
10 TABLET, FILM COATED ORAL EVERY 6 HOURS PRN
Qty: 15 TABLET | Refills: 0 | Status: SHIPPED | OUTPATIENT
Start: 2025-07-28

## 2025-07-28 RX ORDER — PROPOFOL 10 MG/ML
INJECTION, EMULSION INTRAVENOUS
Status: DISCONTINUED | OUTPATIENT
Start: 2025-07-28 | End: 2025-07-28 | Stop reason: SDUPTHER

## 2025-07-28 RX ORDER — OXYBUTYNIN CHLORIDE 5 MG/1
5 TABLET, EXTENDED RELEASE ORAL DAILY
Qty: 14 TABLET | Refills: 0 | Status: SHIPPED | OUTPATIENT
Start: 2025-07-28 | End: 2025-08-11

## 2025-07-28 RX ORDER — DOXYCYCLINE HYCLATE 100 MG
100 TABLET ORAL 2 TIMES DAILY
Qty: 6 TABLET | Refills: 0 | Status: SHIPPED | OUTPATIENT
Start: 2025-07-28 | End: 2025-07-31

## 2025-07-28 RX ORDER — ONDANSETRON 2 MG/ML
INJECTION INTRAMUSCULAR; INTRAVENOUS
Status: DISCONTINUED | OUTPATIENT
Start: 2025-07-28 | End: 2025-07-28 | Stop reason: SDUPTHER

## 2025-07-28 RX ADMIN — FENTANYL CITRATE 100 MCG: 50 INJECTION, SOLUTION INTRAMUSCULAR; INTRAVENOUS at 12:07

## 2025-07-28 RX ADMIN — ONDANSETRON 4 MG: 2 INJECTION, SOLUTION INTRAMUSCULAR; INTRAVENOUS at 12:12

## 2025-07-28 RX ADMIN — TAMSULOSIN HYDROCHLORIDE 0.4 MG: 0.4 CAPSULE ORAL at 10:09

## 2025-07-28 RX ADMIN — Medication 100 MG: at 12:00

## 2025-07-28 RX ADMIN — CEFAZOLIN 2 G: 1 INJECTION, POWDER, FOR SOLUTION INTRAMUSCULAR; INTRAVENOUS at 12:09

## 2025-07-28 RX ADMIN — EPHEDRINE SULFATE 15 MG: 5 INJECTION INTRAVENOUS at 12:07

## 2025-07-28 RX ADMIN — SODIUM CHLORIDE: 0.9 INJECTION, SOLUTION INTRAVENOUS at 08:40

## 2025-07-28 RX ADMIN — PROPOFOL 140 MG: 10 INJECTION, EMULSION INTRAVENOUS at 12:00

## 2025-07-28 RX ADMIN — DEXAMETHASONE SODIUM PHOSPHATE 8 MG: 4 INJECTION, SOLUTION INTRAMUSCULAR; INTRAVENOUS at 12:12

## 2025-07-28 RX ADMIN — EPHEDRINE SULFATE 10 MG: 5 INJECTION INTRAVENOUS at 12:12

## 2025-07-28 RX ADMIN — OXYCODONE 5 MG: 5 TABLET ORAL at 15:37

## 2025-07-28 RX ADMIN — FAMOTIDINE 20 MG: 20 TABLET, FILM COATED ORAL at 08:59

## 2025-07-28 ASSESSMENT — PAIN SCALES - GENERAL
PAINLEVEL_OUTOF10: 3
PAINLEVEL_OUTOF10: 4
PAINLEVEL_OUTOF10: 0
PAINLEVEL_OUTOF10: 2
PAINLEVEL_OUTOF10: 0

## 2025-07-28 ASSESSMENT — PAIN DESCRIPTION - DESCRIPTORS: DESCRIPTORS: DULL

## 2025-07-28 ASSESSMENT — PAIN DESCRIPTION - ORIENTATION: ORIENTATION: RIGHT

## 2025-07-28 ASSESSMENT — PAIN DESCRIPTION - LOCATION: LOCATION: BACK

## 2025-07-28 ASSESSMENT — PAIN - FUNCTIONAL ASSESSMENT: PAIN_FUNCTIONAL_ASSESSMENT: ACTIVITIES ARE NOT PREVENTED

## 2025-07-28 NOTE — DISCHARGE INSTRUCTIONS
Discharge instructions: Ureteroscopy lithotripsy and stent placement (with string):  You may see blood in the urine after the procedure.  This should resolve over the next couple days.  Please stay hydrated.  You may see intermittent blood in the urine while the stent is in place.  This is expected.  You may experience flank pain, and/or frequency/urgency of urination while the stent is in place.  Please use medications prescribed to help with these symptoms.    Pt ok to discharge home in good condition  No heavy lifting, >10 lbs for today  Pt should avoid strenuous activity for today  Pt should walk moderately at home  Pt ok to shower   Pt may resume diet as tolerated  Pt should take Rx as directed  No driving while on narcotics  Please call attending physician or hospital  with questions  Call or Present to ED if fever (> 101F), intractable nausea vomiting or pain.  Rx in chart    Pt should follow up with Dr. Kiko Reed Jr, MD, in 3 months with LAURA and wiley, call to confirm appointment.    Pt should Pull stent in the morning of 7/30.  There may be some pain associated with the stent removal, which is usually self limiting.  We suggest using the pain medication prescribed for you and a nonsteroidal anti-inflammatory such as Ibuprofen, if you are able to take this medication, to control symptoms.  Take Ibuprofen as directed for 24 hrs after stent pull.  Please stay hydrated.  Please call with questions.

## 2025-07-28 NOTE — OP NOTE
FACILITY:  [place], [place]   Sami Green  1950  [id number]    DATE: 07/28/25   SURGEON:  Dr. Kiko Reed Jr, MD , MD  ASSISTANT: Dr. Kiko Reed Jr, MD MD  PREOPERATIVE DIAGNOSIS: right sided kidney stone  POSTOPERATIVE DIAGNOSIS: right sided kidney stone  PROCEDURES PERFORMED:  1. Cystoscopy.  2. right sided ureteroscopy with stone extraction (laser on stand by)  3. right sided stent placement.  DRAINS: A right sided 6x26 cm double J ureteral stent ( with string)  SPECIMEN: none  ANESTHESIA: General  ESTIMATED BLOOD LOSS: None.   COMPLICATIONS: None.     INDICATIONS FOR PROCEDURE:  Sami Green is a 75 y.o. male presents for right sided calculus.     After the risks, benefits, alternatives, of the procedure were discussed with the patient, informed consent was obtained.  The patient elected to proceed.     DETAILS OF THE PROCEDURE:  The patient was brought back from the preoperative holding area to the  operating suite, and was transferred to the operating table where the patient lay in supine position. EPC's were in place, connected to the machine and the machine was turned on before induction.  General endotracheal anesthesia was induced, and patient was prepped and draped in standard surgical fashion after being placed in dorsolithotomy position. A proper timeout was performed, preoperative antibiotics were given. We began by inserting a cystoscope with a 22 [place] sheath and 30 degree lens into the patient's urethral meatus and advancing into the bladder without complication.  A pan cystoscopy was preformed and the bladder appeared unremarkable.  We then focused our attention on the ureteral orifice, which we cannulated with our glidewire, advanced up to renal pelvis.  We then used a dual lumen, to perform a retrograde pyelogram to identify the level of obstruction and renal pelvis.  We then used the catheter to place a second wire.  Once in good position, we advanced our rigid

## 2025-07-28 NOTE — PROGRESS NOTES
INTERNAL MEDICINE Progress Note  7/28/2025 6:10 PM  Subjective:   Admit Date: 7/27/2025  PCP: Neri Covington MD  Interval History: Patient had cystoscopy and right-sided ureteroscopy with stone extraction today.  Right stent was placed.  He is doing well postop.    Objective:   Vitals: /71   Pulse 71   Temp 97.3 °F (36.3 °C) (Oral)   Resp 18   Ht 1.753 m (5' 9\")   Wt 81.6 kg (180 lb)   SpO2 93%   BMI 26.58 kg/m²   General appearance: alert and cooperative with exam  HEENT: Head: atraumatic  Neck: no adenopathy, no carotid bruit, and no JVD  Lungs: clear to auscultation bilaterally  Heart: S1, S2 normal  Abdomen: soft, non-tender; bowel sounds normal; no masses,  no organomegaly  Extremities: no edema, redness or tenderness in the calves or thighs  Neurologic: Mental status: Alert, oriented, thought content appropriate      Medications:   Scheduled Meds:   tamsulosin  0.4 mg Oral Daily    atorvastatin  20 mg Oral Nightly    amLODIPine  10 mg Oral Daily    sodium chloride flush  5-40 mL IntraVENous 2 times per day    famotidine  20 mg Oral Daily     Continuous Infusions:   sodium chloride 100 mL/hr at 07/28/25 0840    sodium chloride         Lab Results:   CBC:   Recent Labs     07/27/25  0725 07/28/25  0631   WBC 7.5 5.9   HGB 14.5 11.8*    133     BMP:    Recent Labs     07/27/25  0725 07/28/25  0631    142   K 4.1 4.2    108   CO2 22 23   BUN 34* 22   CREATININE 1.8* 1.3*   GLUCOSE 177* 98     Hepatic:   Recent Labs     07/27/25  0725   AST 21   ALT 27   BILITOT 0.3   ALKPHOS 126     HgBA1c:    Lab Results   Component Value Date/Time    LABA1C 6.3 09/11/2024 07:57 AM     TSH:    Lab Results   Component Value Date/Time    TSH 0.390 11/23/2020 04:55 AM     IRON:    Lab Results   Component Value Date/Time    IRON 113 01/12/2021 01:52 PM     FERRITIN:    Lab Results   Component Value Date/Time    FERRITIN 224 01/12/2021 01:52 PM     7/28/2025 procedure  1. Cystoscopy.  2. right sided

## 2025-07-28 NOTE — PLAN OF CARE
Problem: Discharge Planning  Goal: Discharge to home or other facility with appropriate resources  7/28/2025 0027 by Genna Lester RN  Outcome: Progressing  Flowsheets (Taken 7/28/2025 0027)  Discharge to home or other facility with appropriate resources:   Identify barriers to discharge with patient and caregiver   Arrange for needed discharge resources and transportation as appropriate   Identify discharge learning needs (meds, wound care, etc)  7/27/2025 1713 by Anahi Kenyon RN  Outcome: Progressing  Flowsheets (Taken 7/27/2025 1713)  Discharge to home or other facility with appropriate resources: Identify barriers to discharge with patient and caregiver     Problem: Safety - Adult  Goal: Free from fall injury  7/28/2025 0027 by Genna Lester RN  Outcome: Progressing  Flowsheets (Taken 7/28/2025 0027)  Free From Fall Injury: Instruct family/caregiver on patient safety  7/27/2025 1713 by Anahi Kenyon RN  Outcome: Progressing  Flowsheets (Taken 7/27/2025 1713)  Free From Fall Injury: Instruct family/caregiver on patient safety     Problem: Pain  Goal: Verbalizes/displays adequate comfort level or baseline comfort level  Outcome: Progressing  Flowsheets (Taken 7/28/2025 0027)  Verbalizes/displays adequate comfort level or baseline comfort level:   Assess pain using appropriate pain scale   Encourage patient to monitor pain and request assistance   Administer analgesics based on type and severity of pain and evaluate response   Implement non-pharmacological measures as appropriate and evaluate response     Problem: Musculoskeletal - Adult  Goal: Return mobility to safest level of function  Outcome: Progressing  Flowsheets (Taken 7/28/2025 0027)  Return Mobility to Safest Level of Function:   Assess patient stability and activity tolerance for standing, transferring and ambulating with or without assistive devices   Assist with transfers and ambulation using safe patient handling equipment as

## 2025-07-28 NOTE — PROGRESS NOTES
1227- pt to pacu. Non responsive, LMA in place. VSS    1232- pt wakes to verbal stimuli, follows command, LMA removed. Pt denies complaint. VSS    1238- pt remains drowsy, continues to deny complaint. VSS    1243- pt continues to deny complaint. VSS    1257-report called to [address] [place], [place] [address]. Pt meets criteria for discharge fro pacu. Transport requested.     1316- Transport arrives to return pt to [facility], no family present to update.    PACU Transfer to [facility]    Vitals:    03/09/23 1630   BP: 130/86   Pulse: 77   Resp: 13   Temp: 97.1 °F (36.2 °C)   SpO2: 95%         Intake/Output Summary (Last 24 hours) at 3/9/2023 1636  Last data filed at 3/9/2023 1630  Gross per 24 hour   Intake 1350 ml   Output --   Net 1350 ml       Pain assessment:    Pain Level: 0    Patient transferred to care of [facility] RN.    3/9/2023 4:36 PM

## 2025-07-28 NOTE — PROGRESS NOTES
Urology Progress Note    Reason for Consult:  kidney stone  Requesting Physician:  ED     History Obtained From:  patient, electronic medical record     Chief Complaint: R flank pain    Subjective: \"    Patient is resting in bed, voiding spontaneously, +flatus, -BM, ambulating with assistance, tolerating regular diet, denies any nausea or vomiting. There are complaints of controlled pain at this time.    Failed MET overnight  Possible intervention today, keep NPO at this time    IMPRESSION/Plan:     Right ureteral stone  - Keep NPO cont MET today with Flomax, IVF, , pain control. Check UA for infection.  - Possible surgical intervention today with Dr. Reed vs later in the week pending clinical course/OR availability.   - Discussed if infection present may not be able to treat stone this admission and will need subsequent procedure.     Cystoscopy, possible RIGHT ureteroscopy, laser lithotripsy, basket retrieval of stone fragments, and RIGHT ureteral stent placement per Dr Reed.   I described the procedure in detail and also described the associated risks and benefits at length. We discussed possible alternative therapies. We discussed the risks and benefits of not undergoing therapy. Patient understands these risks and benefits and desires to proceed.      Post-op expectations were discussed; stent pain, urinary frequency and urgency secondary to the stent, dysuria which should improve 1-2 days after procedure, and intermittent hematuria can be expected as long as stent is in place.     BPH  - Continue Flomax     Elevated PSA  - continue with outpatient follow up      Will follow            Vitals:  BP (!) 151/73   Pulse 62   Temp 97.8 °F (36.6 °C) (Oral)   Resp 16   Ht 1.753 m (5' 9\")   Wt 81.6 kg (180 lb)   SpO2 95%   BMI 26.58 kg/m²   Temp  Av °F (36.7 °C)  Min: 97.5 °F (36.4 °C)  Max: 98.8 °F (37.1 °C)    Intake/Output Summary (Last 24 hours) at 2025 1039  Last data filed at 2025

## 2025-07-28 NOTE — ANESTHESIA POSTPROCEDURE EVALUATION
Department of Anesthesiology  Postprocedure Note    Patient: Sami Green  MRN: [id number]  YOB: 1950  Date of evaluation: 7/28/2025    Procedure Summary       Date: 07/28/25 Room / Location: [facility]  / [facility] OR    Anesthesia Start: 1157 Anesthesia Stop: 1229    Procedure: CYSTO, RIGHT URETEROSCOPY, BASKET RETRIEVAL OF STONE FRAGMENTS, RIGHT STENT PLACEMENT, LASER ON STANDBY (Right) Diagnosis:       Kidney stone on right side      (Kidney stone on right side [N20.0])    Surgeons: Kiko Reed Jr., MD Responsible Provider: Baudilio Ford MD    Anesthesia Type: general ASA Status: 3            Anesthesia Type: No value filed.    Maurilio Phase I: Maurilio Score: 4    Maurilio Phase II:      Anesthesia Post Evaluation    Patient location during evaluation: PACU  Patient participation: complete - patient participated  Level of consciousness: awake  Airway patency: patent  Nausea & Vomiting: no vomiting and no nausea  Cardiovascular status: hemodynamically stable  Respiratory status: acceptable and nasal cannula  Hydration status: stable  Pain management: adequate    No notable events documented.

## 2025-07-28 NOTE — ANESTHESIA PRE PROCEDURE
Department of Anesthesiology  Preprocedure Note       Name:  Sami Green   Age:  75 y.o.  :  1950                                          MRN:  [id number]         Date:  2025      Surgeon: Surgeon(s):  Kiko Reed Jr., MD    Procedure: Procedure(s):  RIGHT STENT INSERTION VS CYSTO, RIGHT URETEROSCOPY, POSS LASER LITHOTRIPSY, BASKET RETRIEVAL OF STONE FRAGMENTS, POSS STENT    Medications prior to admission:   Prior to Admission medications    Medication Sig Start Date End Date Taking? Authorizing Provider   losartan (COZAAR) 25 MG tablet Take 1 tablet by mouth daily 25  Yes Neri Covington MD   hydrOXYzine HCl (ATARAX) 10 MG tablet Take 1 tablet by mouth nightly as needed for Anxiety   Yes ProviderAmador MD   amLODIPine (NORVASC) 10 MG tablet Take 1 tablet by mouth daily 5/15/25  Yes Neri Covington MD   atorvastatin (LIPITOR) 20 MG tablet Take 1 tablet by mouth nightly 3/11/25  Yes Neri Covington MD   tamsulosin (FLOMAX) 0.4 MG capsule Take 1 capsule by mouth daily 10/15/24  Yes Jojo Hagan APRN - CNP       Current medications:    Current Facility-Administered Medications   Medication Dose Route Frequency Provider Last Rate Last Admin   • morphine (PF) injection 2 mg  2 mg IntraVENous Q2H PRN Rayshawn Wellington MD   2 mg at 25 2336   • 0.9 % sodium chloride infusion   IntraVENous Continuous Stacie Cope  mL/hr at 25 0840 New Bag at 25 0840   • tamsulosin (FLOMAX) capsule 0.4 mg  0.4 mg Oral Daily Shaquille Huggins, APRKALYANI - CNP   0.4 mg at 25 1009   • atorvastatin (LIPITOR) tablet 20 mg  20 mg Oral Nightly Stacie Cope MD   20 mg at 25   • amLODIPine (NORVASC) tablet 10 mg  10 mg Oral Daily Stacie Cope MD   10 mg at 25 1710   • sodium chloride flush 0.9 % injection 5-40 mL  5-40 mL IntraVENous 2 times per day Stacie Cope MD       • sodium chloride flush 0.9 % injection 5-40 mL  5-40 mL IntraVENous PRN Anatoliy,

## 2025-07-28 NOTE — DISCHARGE SUMMARY
Discharge Summary    Gallardo JOANNA Green  :  1950  MRN:  [id number]    Admit date:  2025  Discharge date:      Admitting Physician:  Stacie Cope MD    Discharge Diagnoses:            Right ureteric calculus with obstructive uropathy s/p cystoscopy right-sided ureteric cystoscopy with stone extraction and right-sided stent placement.  Right hydronephrosis.  MEAGHAN improved  Hypertension      Admission Condition:  serious  Discharged Condition:  good    Hospital Course:   75-year-old elderly man presented with acute renal colic.  Found to have an obstructive stone in the right ureter.  Patient had acute kidney injury as well he was admitted hydrated with IV fluid.  Urology perform cystoscopy right-sided ureteroscopy with stone extraction with a right sided stent placement.  He did well with surgery.  He has been discharged home.  Renal function improved with hydration.  Will follow-up with urology as outpatient and follow-up with the family doctor as scheduled.     2025 procedure  1. Cystoscopy.  2. right sided ureteroscopy with stone extraction (laser on stand by)  3. right sided stent placement.  DRAINS: A right sided 6x26 cm double J ureteral stent ( with string)  SPECIMEN: none     Discharge Medications:         Medication List        START taking these medications      doxycycline hyclate 100 MG tablet  Commonly known as: VIBRA-TABS  Take 1 tablet by mouth 2 times daily for 3 days     ketorolac 10 MG tablet  Commonly known as: TORADOL  Take 1 tablet by mouth every 6 hours as needed for Pain     oxyBUTYnin 5 MG extended release tablet  Commonly known as: Ditropan XL  Take 1 tablet by mouth daily for 14 days     phenazopyridine 100 MG tablet  Commonly known as: Pyridium  Take 1 tablet by mouth 3 times daily as needed for Pain            CONTINUE taking these medications      amLODIPine 10 MG tablet  Commonly known as: NORVASC  Take 1 tablet by mouth daily     atorvastatin 20 MG tablet  Commonly known

## 2025-07-28 NOTE — PLAN OF CARE
Problem: Discharge Planning  Goal: Discharge to home or other facility with appropriate resources  Outcome: Completed     Problem: Safety - Adult  Goal: Free from fall injury  Outcome: Completed     Problem: Pain  Goal: Verbalizes/displays adequate comfort level or baseline comfort level  Outcome: Completed     Problem: Musculoskeletal - Adult  Goal: Return mobility to safest level of function  Outcome: Completed     Problem: Genitourinary - Adult  Goal: Absence of urinary retention  Outcome: Completed     Problem: Metabolic/Fluid and Electrolytes - Adult  Goal: Electrolytes maintained within normal limits  Outcome: Completed

## 2025-07-28 NOTE — PROGRESS NOTES
AVS reviewed with pt. Pt verbalized understanding. Pt understands he is to pull stents on 7/30/25. All belongings sent home with pt. Pt walked down to discharge lobby with daughter. Transported home via by private car.

## 2025-07-29 ENCOUNTER — PATIENT MESSAGE (OUTPATIENT)
Dept: FAMILY MEDICINE CLINIC | Age: 75
End: 2025-07-29

## 2025-07-29 ENCOUNTER — CARE COORDINATION (OUTPATIENT)
Dept: CARE COORDINATION | Age: 75
End: 2025-07-29

## 2025-07-29 DIAGNOSIS — N20.1 RIGHT URETERAL STONE: Primary | ICD-10-CM

## 2025-07-29 PROCEDURE — 1111F DSCHRG MED/CURRENT MED MERGE: CPT | Performed by: FAMILY MEDICINE

## 2025-07-29 NOTE — CARE COORDINATION
Care Transitions Note    Initial Call - Call within 2 business days of discharge: Yes    Patient Current Location:  Home: [address] [place] [id number]    Care Transition Nurse contacted the patient by telephone to perform post hospital discharge assessment, verified name and  as identifiers.  Provided introduction to self, and explanation of the Care Transition Nurse role.    Patient: Gallardo E Norma    Patient : 1950   MRN: [id number]    Reason for Admission: Back pain, Vomiting, Right ureteral calculus  Discharge Date: 25  RURS: Readmission Risk Score: 12.4      Last Discharge Facility       Date Complaint Diagnosis Description Type Department Provider    25 Back Pain; Vomiting Right ureteral calculus ... ED to Hosp-Admission (Discharged) (ADMITTED) Stacie Santiago MD; Rayshawn Wellington MD            Was this an external facility discharge? No    Additional needs identified to be addressed with provider   No needs identified             Method of communication with provider: none.    Patients top risk factors for readmission: medical condition-right ureteral stone, primary HTN    Interventions to address risk factors:   Education: discharge instructions  Review of patient management of conditions/medications: right ureteral stone    Care Summary Note: Contacted pt for initial care transition follow up.  Gallardo states he is doing better than he had been but still have soreness in his back.  States pain getting better slowly.  Reports pain when urinating.  He confirmed he has the pain medication and taking as needed.  Reports having a little blood last night but starting to clear up today.  Denies having any nausea/vomiting, fever or chills.  Reviewed discharge instructions.  Pt reports he will be pulling the stent.  Paperwork says to pull stent on 25 in the morning but pt states he was told by the hospital to pull the stent on 25 in the morning.  Discussed when to contact urology.

## 2025-07-31 LAB — STONE ANALYSIS: NORMAL

## 2025-08-01 ENCOUNTER — CARE COORDINATION (OUTPATIENT)
Dept: CARE COORDINATION | Age: 75
End: 2025-08-01

## 2025-08-01 NOTE — CARE COORDINATION
Care Transitions Note    Follow Up Call     Patient Current Location:  Home: [address] [place] [address]    Care Transition Nurse contacted the patient by telephone. Verified name and  as identifiers.    Additional needs identified to be addressed with provider   No needs identified                 Method of communication with provider: none.    Care Summary Note: Contacted pt for care transition follow up.  Gallardo states he is doing very well.  Reports he pulled the stent as he was instructed to.  States he did fine when pulling the stent.  States he took pain medication prior and it was effective.  Pt continues to drink plenty of water.  Reports he may still have a little bit of pain.  Denies having any hematuria, nausea/vomiting.  Reports oral intake is slowly improving.  Pt has an appt with PCP on 25.  This date worked best for him because the sitter for his wife will be out. Aware of when to contact providers.  No questions or concerns at this time.      Plan of care updates since last contact:  Review of patient management of conditions/medications: right ureteral stone       Advance Care Planning:   Does patient have an Advance Directive: reviewed during previous call, see note. .    Medication Review:  No changes since last call.     Remote Patient Monitoring:  Offered patient enrollment in the Remote Patient Monitoring (RPM) program for in-home monitoring: Yes, but did not enroll at this time: controlled chronic disease management.    Assessments:  Care Transitions Subsequent and Final Call    Subsequent and Final Calls  Do you have any ongoing symptoms?: Yes  Patient-reported symptoms: Pain  Have your medications changed?: No  Do you have any questions related to your medications?: No  Do you currently have any active services?: No  Do you have any needs or concerns that I can assist you with?: No  Care Transitions Interventions    Physical Therapy: Completed       Transportation Support:

## 2025-08-08 ENCOUNTER — CARE COORDINATION (OUTPATIENT)
Dept: CARE COORDINATION | Age: 75
End: 2025-08-08

## 2025-08-11 ENCOUNTER — OFFICE VISIT (OUTPATIENT)
Dept: FAMILY MEDICINE CLINIC | Age: 75
End: 2025-08-11

## 2025-08-11 VITALS
BODY MASS INDEX: 26.75 KG/M2 | HEART RATE: 72 BPM | WEIGHT: 180.6 LBS | RESPIRATION RATE: 18 BRPM | HEIGHT: 69 IN | TEMPERATURE: 98.3 F | SYSTOLIC BLOOD PRESSURE: 112 MMHG | DIASTOLIC BLOOD PRESSURE: 70 MMHG | OXYGEN SATURATION: 97 %

## 2025-08-11 DIAGNOSIS — Z09 HOSPITAL DISCHARGE FOLLOW-UP: ICD-10-CM

## 2025-08-11 DIAGNOSIS — N20.1 RIGHT URETERAL STONE: Primary | ICD-10-CM

## 2025-08-11 DIAGNOSIS — I10 PRIMARY HYPERTENSION: ICD-10-CM

## 2025-08-11 PROBLEM — N23 RENAL COLIC ON RIGHT SIDE: Status: RESOLVED | Noted: 2025-07-28 | Resolved: 2025-08-11

## 2025-08-11 PROBLEM — N17.9 AKI (ACUTE KIDNEY INJURY): Status: RESOLVED | Noted: 2025-07-28 | Resolved: 2025-08-11

## 2025-08-14 ENCOUNTER — CARE COORDINATION (OUTPATIENT)
Dept: CARE COORDINATION | Age: 75
End: 2025-08-14

## 2025-08-24 DIAGNOSIS — I10 PRIMARY HYPERTENSION: ICD-10-CM

## 2025-08-25 RX ORDER — LOSARTAN POTASSIUM 25 MG/1
25 TABLET ORAL DAILY
Qty: 90 TABLET | Refills: 3 | Status: SHIPPED | OUTPATIENT
Start: 2025-08-25

## 2025-08-27 ENCOUNTER — CARE COORDINATION (OUTPATIENT)
Dept: CARE COORDINATION | Age: 75
End: 2025-08-27

## 2025-08-28 ENCOUNTER — OFFICE VISIT (OUTPATIENT)
Dept: UROLOGY | Age: 75
End: 2025-08-28
Payer: MEDICARE

## 2025-08-28 ENCOUNTER — CARE COORDINATION (OUTPATIENT)
Dept: CARE COORDINATION | Age: 75
End: 2025-08-28

## 2025-08-28 VITALS
HEIGHT: 69 IN | DIASTOLIC BLOOD PRESSURE: 80 MMHG | SYSTOLIC BLOOD PRESSURE: 130 MMHG | BODY MASS INDEX: 26.66 KG/M2 | WEIGHT: 180 LBS

## 2025-08-28 DIAGNOSIS — N40.1 BENIGN PROSTATIC HYPERPLASIA WITH NOCTURIA: Primary | ICD-10-CM

## 2025-08-28 DIAGNOSIS — R35.1 BENIGN PROSTATIC HYPERPLASIA WITH NOCTURIA: Primary | ICD-10-CM

## 2025-08-28 DIAGNOSIS — N20.0 KIDNEY STONE: ICD-10-CM

## 2025-08-28 LAB
BILIRUBIN, URINE: NEGATIVE
BLOOD URINE, POC: NEGATIVE
CHARACTER, URINE: CLEAR
COLOR, UA: YELLOW
GLUCOSE URINE: NEGATIVE MG/DL
KETONES, URINE: NEGATIVE
LEUKOCYTE CLUMPS, URINE: NEGATIVE
NITRITE, URINE: NEGATIVE
PH, URINE: 5.5 (ref 5–9)
POST VOID RESIDUAL (PVR): 22 ML
PROTEIN, URINE: NEGATIVE MG/DL
SPECIFIC GRAVITY UA: 1.02 (ref 1–1.03)
UROBILINOGEN, URINE: 0.2 EU/DL (ref 0–1)

## 2025-08-28 PROCEDURE — 99214 OFFICE O/P EST MOD 30 MIN: CPT | Performed by: NURSE PRACTITIONER

## 2025-08-28 PROCEDURE — 51798 US URINE CAPACITY MEASURE: CPT | Performed by: NURSE PRACTITIONER

## 2025-08-28 PROCEDURE — G8417 CALC BMI ABV UP PARAM F/U: HCPCS | Performed by: NURSE PRACTITIONER

## 2025-08-28 PROCEDURE — 3079F DIAST BP 80-89 MM HG: CPT | Performed by: NURSE PRACTITIONER

## 2025-08-28 PROCEDURE — 81003 URINALYSIS AUTO W/O SCOPE: CPT | Performed by: NURSE PRACTITIONER

## 2025-08-28 PROCEDURE — 3075F SYST BP GE 130 - 139MM HG: CPT | Performed by: NURSE PRACTITIONER

## 2025-08-28 PROCEDURE — 1123F ACP DISCUSS/DSCN MKR DOCD: CPT | Performed by: NURSE PRACTITIONER

## 2025-08-28 PROCEDURE — 1159F MED LIST DOCD IN RCRD: CPT | Performed by: NURSE PRACTITIONER

## 2025-08-28 PROCEDURE — G8427 DOCREV CUR MEDS BY ELIG CLIN: HCPCS | Performed by: NURSE PRACTITIONER

## 2025-08-28 PROCEDURE — 1036F TOBACCO NON-USER: CPT | Performed by: NURSE PRACTITIONER

## 2025-08-28 RX ORDER — TAMSULOSIN HYDROCHLORIDE 0.4 MG/1
0.4 CAPSULE ORAL DAILY
Qty: 90 CAPSULE | Refills: 3 | Status: SHIPPED | OUTPATIENT
Start: 2025-08-28

## 2025-09-04 ENCOUNTER — CARE COORDINATION (OUTPATIENT)
Dept: CARE COORDINATION | Age: 75
End: 2025-09-04

## (undated) DEVICE — SINGLE-USE DIGITAL FLEXIBLE URETEROSCOPE: Brand: LITHOVUE

## (undated) DEVICE — ADAPTER URO SCP UROLOK LL

## (undated) DEVICE — SINGLE ACTION PUMPING SYSTEM

## (undated) DEVICE — CYSTO: Brand: MEDLINE INDUSTRIES, INC.

## (undated) DEVICE — SOLUTION IRRIG 3000ML 0.9% SOD CHL USP UROMATIC PLAS CONT

## (undated) DEVICE — GUIDEWIRE URO L150CM DIA .035IN STIFF NIT HYDRPHL STR TIP

## (undated) DEVICE — NITINOL STONE RETRIEVAL BASKET: Brand: ZERO TIP

## (undated) DEVICE — STRIP,CLOSURE,WOUND,MEDI-STRIP,1/2X4: Brand: MEDLINE

## (undated) DEVICE — DUAL LUMEN URETERAL CATHETER